# Patient Record
Sex: MALE | Race: WHITE | NOT HISPANIC OR LATINO | Employment: UNEMPLOYED | URBAN - METROPOLITAN AREA
[De-identification: names, ages, dates, MRNs, and addresses within clinical notes are randomized per-mention and may not be internally consistent; named-entity substitution may affect disease eponyms.]

---

## 2022-04-10 ENCOUNTER — HISTORICAL (OUTPATIENT)
Dept: ADMINISTRATIVE | Facility: HOSPITAL | Age: 42
End: 2022-04-10

## 2022-04-29 VITALS
DIASTOLIC BLOOD PRESSURE: 75 MMHG | SYSTOLIC BLOOD PRESSURE: 122 MMHG | HEIGHT: 69 IN | BODY MASS INDEX: 28.99 KG/M2 | WEIGHT: 195.75 LBS

## 2022-11-21 ENCOUNTER — HOSPITAL ENCOUNTER (OUTPATIENT)
Facility: HOSPITAL | Age: 42
Discharge: HOME | End: 2022-11-23
Attending: INTERNAL MEDICINE | Admitting: INTERNAL MEDICINE
Payer: MEDICARE

## 2022-11-21 DIAGNOSIS — R00.0 TACHYCARDIA: ICD-10-CM

## 2022-11-21 DIAGNOSIS — R65.10 SIRS (SYSTEMIC INFLAMMATORY RESPONSE SYNDROME): Primary | ICD-10-CM

## 2022-11-21 DIAGNOSIS — M25.50 POLYARTHRALGIA: ICD-10-CM

## 2022-11-21 DIAGNOSIS — M25.532 ACUTE PAIN OF LEFT WRIST: ICD-10-CM

## 2022-11-21 LAB
ALBUMIN SERPL-MCNC: 2.9 GM/DL (ref 3.5–5)
ALBUMIN/GLOB SERPL: 0.6 RATIO (ref 1.1–2)
ALP SERPL-CCNC: 80 UNIT/L (ref 40–150)
ALT SERPL-CCNC: 10 UNIT/L (ref 0–55)
AST SERPL-CCNC: 10 UNIT/L (ref 5–34)
BASOPHILS # BLD AUTO: 0.03 X10(3)/MCL (ref 0–0.2)
BASOPHILS NFR BLD AUTO: 0.2 %
BILIRUBIN DIRECT+TOT PNL SERPL-MCNC: 0.5 MG/DL
BUN SERPL-MCNC: 16 MG/DL (ref 8.9–20.6)
C TRACH DNA SPEC QL NAA+PROBE: NOT DETECTED
CALCIUM SERPL-MCNC: 10.1 MG/DL (ref 8.4–10.2)
CHLORIDE SERPL-SCNC: 101 MMOL/L (ref 98–107)
CO2 SERPL-SCNC: 23 MMOL/L (ref 22–29)
CREAT SERPL-MCNC: 1.07 MG/DL (ref 0.73–1.18)
CRP SERPL-MCNC: 301 MG/L
EOSINOPHIL # BLD AUTO: 0.01 X10(3)/MCL (ref 0–0.9)
EOSINOPHIL NFR BLD AUTO: 0.1 %
ERYTHROCYTE [DISTWIDTH] IN BLOOD BY AUTOMATED COUNT: 13.9 % (ref 11.5–17)
ERYTHROCYTE [SEDIMENTATION RATE] IN BLOOD: 30 MM/HR (ref 0–15)
GFR SERPLBLD CREATININE-BSD FMLA CKD-EPI: >60 MLS/MIN/1.73/M2
GLOBULIN SER-MCNC: 4.8 GM/DL (ref 2.4–3.5)
GLUCOSE SERPL-MCNC: 117 MG/DL (ref 74–100)
HCT VFR BLD AUTO: 43 % (ref 42–52)
HGB BLD-MCNC: 14.3 GM/DL (ref 14–18)
HIV 1+2 AB+HIV1 P24 AG SERPL QL IA: NONREACTIVE
IMM GRANULOCYTES # BLD AUTO: 0.04 X10(3)/MCL (ref 0–0.04)
IMM GRANULOCYTES NFR BLD AUTO: 0.3 %
LACTATE SERPL-SCNC: 1.5 MMOL/L (ref 0.5–2.2)
LYMPHOCYTES # BLD AUTO: 1.59 X10(3)/MCL (ref 0.6–4.6)
LYMPHOCYTES NFR BLD AUTO: 11.9 %
MCH RBC QN AUTO: 27.4 PG (ref 27–31)
MCHC RBC AUTO-ENTMCNC: 33.3 MG/DL (ref 33–36)
MCV RBC AUTO: 82.5 FL (ref 80–94)
MONOCYTES # BLD AUTO: 0.82 X10(3)/MCL (ref 0.1–1.3)
MONOCYTES NFR BLD AUTO: 6.1 %
N GONORRHOEA DNA SPEC QL NAA+PROBE: NOT DETECTED
NEUTROPHILS # BLD AUTO: 10.9 X10(3)/MCL (ref 2.1–9.2)
NEUTROPHILS NFR BLD AUTO: 81.4 %
NRBC BLD AUTO-RTO: 0 %
PLATELET # BLD AUTO: 378 X10(3)/MCL (ref 130–400)
PMV BLD AUTO: 9.6 FL (ref 7.4–10.4)
POTASSIUM SERPL-SCNC: 4.5 MMOL/L (ref 3.5–5.1)
PROT SERPL-MCNC: 7.7 GM/DL (ref 6.4–8.3)
RBC # BLD AUTO: 5.21 X10(6)/MCL (ref 4.7–6.1)
SODIUM SERPL-SCNC: 136 MMOL/L (ref 136–145)
T PALLIDUM AB SER QL: REACTIVE
URATE SERPL-MCNC: 4.4 MG/DL (ref 3.5–7.2)
WBC # SPEC AUTO: 13.4 X10(3)/MCL (ref 4.5–11.5)

## 2022-11-21 PROCEDURE — 86592 SYPHILIS TEST NON-TREP QUAL: CPT | Performed by: PHYSICIAN ASSISTANT

## 2022-11-21 PROCEDURE — 63600175 PHARM REV CODE 636 W HCPCS: Performed by: STUDENT IN AN ORGANIZED HEALTH CARE EDUCATION/TRAINING PROGRAM

## 2022-11-21 PROCEDURE — 87591 N.GONORRHOEAE DNA AMP PROB: CPT | Performed by: PHYSICIAN ASSISTANT

## 2022-11-21 PROCEDURE — 83605 ASSAY OF LACTIC ACID: CPT | Performed by: PHYSICIAN ASSISTANT

## 2022-11-21 PROCEDURE — 25000003 PHARM REV CODE 250: Performed by: PHYSICIAN ASSISTANT

## 2022-11-21 PROCEDURE — 85651 RBC SED RATE NONAUTOMATED: CPT | Performed by: PHYSICIAN ASSISTANT

## 2022-11-21 PROCEDURE — 80053 COMPREHEN METABOLIC PANEL: CPT | Performed by: PHYSICIAN ASSISTANT

## 2022-11-21 PROCEDURE — 87491 CHLMYD TRACH DNA AMP PROBE: CPT | Performed by: PHYSICIAN ASSISTANT

## 2022-11-21 PROCEDURE — 96361 HYDRATE IV INFUSION ADD-ON: CPT

## 2022-11-21 PROCEDURE — 84550 ASSAY OF BLOOD/URIC ACID: CPT | Performed by: PHYSICIAN ASSISTANT

## 2022-11-21 PROCEDURE — G0378 HOSPITAL OBSERVATION PER HR: HCPCS

## 2022-11-21 PROCEDURE — 63600175 PHARM REV CODE 636 W HCPCS: Performed by: PHYSICIAN ASSISTANT

## 2022-11-21 PROCEDURE — 36415 COLL VENOUS BLD VENIPUNCTURE: CPT | Performed by: STUDENT IN AN ORGANIZED HEALTH CARE EDUCATION/TRAINING PROGRAM

## 2022-11-21 PROCEDURE — 96372 THER/PROPH/DIAG INJ SC/IM: CPT | Mod: 59 | Performed by: STUDENT IN AN ORGANIZED HEALTH CARE EDUCATION/TRAINING PROGRAM

## 2022-11-21 PROCEDURE — 87389 HIV-1 AG W/HIV-1&-2 AB AG IA: CPT | Performed by: STUDENT IN AN ORGANIZED HEALTH CARE EDUCATION/TRAINING PROGRAM

## 2022-11-21 PROCEDURE — 85025 COMPLETE CBC W/AUTO DIFF WBC: CPT | Performed by: PHYSICIAN ASSISTANT

## 2022-11-21 PROCEDURE — 99285 EMERGENCY DEPT VISIT HI MDM: CPT | Mod: 25

## 2022-11-21 PROCEDURE — 96372 THER/PROPH/DIAG INJ SC/IM: CPT | Performed by: PHYSICIAN ASSISTANT

## 2022-11-21 PROCEDURE — 86140 C-REACTIVE PROTEIN: CPT | Performed by: PHYSICIAN ASSISTANT

## 2022-11-21 PROCEDURE — 86780 TREPONEMA PALLIDUM: CPT | Performed by: PHYSICIAN ASSISTANT

## 2022-11-21 PROCEDURE — 0240U COVID/FLU A&B PCR: CPT | Performed by: STUDENT IN AN ORGANIZED HEALTH CARE EDUCATION/TRAINING PROGRAM

## 2022-11-21 PROCEDURE — 93005 ELECTROCARDIOGRAM TRACING: CPT

## 2022-11-21 PROCEDURE — 87040 BLOOD CULTURE FOR BACTERIA: CPT | Performed by: PHYSICIAN ASSISTANT

## 2022-11-21 RX ORDER — IBUPROFEN 200 MG
16 TABLET ORAL
Status: DISCONTINUED | OUTPATIENT
Start: 2022-11-21 | End: 2022-11-23 | Stop reason: HOSPADM

## 2022-11-21 RX ORDER — SODIUM CHLORIDE 9 MG/ML
INJECTION, SOLUTION INTRAVENOUS CONTINUOUS
Status: DISCONTINUED | OUTPATIENT
Start: 2022-11-22 | End: 2022-11-23

## 2022-11-21 RX ORDER — KETOROLAC TROMETHAMINE 30 MG/ML
30 INJECTION, SOLUTION INTRAMUSCULAR; INTRAVENOUS
Status: COMPLETED | OUTPATIENT
Start: 2022-11-21 | End: 2022-11-21

## 2022-11-21 RX ORDER — GLUCAGON 1 MG
1 KIT INJECTION
Status: DISCONTINUED | OUTPATIENT
Start: 2022-11-21 | End: 2022-11-23 | Stop reason: HOSPADM

## 2022-11-21 RX ORDER — IBUPROFEN 200 MG
24 TABLET ORAL
Status: DISCONTINUED | OUTPATIENT
Start: 2022-11-21 | End: 2022-11-23 | Stop reason: HOSPADM

## 2022-11-21 RX ORDER — ACETAMINOPHEN 325 MG/1
650 TABLET ORAL EVERY 6 HOURS PRN
Status: DISCONTINUED | OUTPATIENT
Start: 2022-11-22 | End: 2022-11-22

## 2022-11-21 RX ORDER — PANTOPRAZOLE SODIUM 40 MG/1
40 TABLET, DELAYED RELEASE ORAL DAILY
Status: ON HOLD | COMMUNITY
End: 2022-11-23 | Stop reason: SDUPTHER

## 2022-11-21 RX ORDER — KETOROLAC TROMETHAMINE 30 MG/ML
15 INJECTION, SOLUTION INTRAMUSCULAR; INTRAVENOUS EVERY 6 HOURS PRN
Status: DISCONTINUED | OUTPATIENT
Start: 2022-11-22 | End: 2022-11-22

## 2022-11-21 RX ORDER — ENOXAPARIN SODIUM 100 MG/ML
40 INJECTION SUBCUTANEOUS EVERY 12 HOURS
Status: DISCONTINUED | OUTPATIENT
Start: 2022-11-21 | End: 2022-11-23 | Stop reason: HOSPADM

## 2022-11-21 RX ORDER — SODIUM CHLORIDE 0.9 % (FLUSH) 0.9 %
10 SYRINGE (ML) INJECTION EVERY 12 HOURS PRN
Status: DISCONTINUED | OUTPATIENT
Start: 2022-11-21 | End: 2022-11-23 | Stop reason: HOSPADM

## 2022-11-21 RX ORDER — NALOXONE HCL 0.4 MG/ML
0.02 VIAL (ML) INJECTION
Status: DISCONTINUED | OUTPATIENT
Start: 2022-11-21 | End: 2022-11-23 | Stop reason: HOSPADM

## 2022-11-21 RX ADMIN — SODIUM CHLORIDE 1000 ML: 9 INJECTION, SOLUTION INTRAVENOUS at 07:11

## 2022-11-21 RX ADMIN — ENOXAPARIN SODIUM 40 MG: 40 INJECTION SUBCUTANEOUS at 09:11

## 2022-11-21 RX ADMIN — KETOROLAC TROMETHAMINE 30 MG: 30 INJECTION, SOLUTION INTRAMUSCULAR at 05:11

## 2022-11-21 NOTE — ED PROVIDER NOTES
Encounter Date: 11/21/2022       History     Chief Complaint   Patient presents with    generalized pain     [place] to [place] arm, bilat legs/ feet, swelling to bilat feet & R knee swelling was told to come here for further testing.      Trey Harper is a 42 y.o. male who presents to the ED complaining of multiple joint pain and swelling. He reports 1 week ago he developed pain and swelling in his left wrist. He saw his PCP who thought he may have an infection and was prescribed bactrim. A few days ago he developed pain and swelling in his bilateral ankles making it difficult to walk. He went to see his PCP today who told him to come to the ED for further eval. He does not have any information from PCP with him, but states that he was told he needed to see infectious disease as there is concern for joint infection. Patient states he was recently treated for syphilis and started on PrEP for suspected HIV (has picture of negative HIV result on his phone. He denies any fevers, chills, chest pain, SOB, abdominal pain, N/V/D, dysuria.    The history is provided by the patient. No  was used.   Review of patient's allergies indicates:  No Known Allergies  No past medical history on file.  No past surgical history on file.  No family history on file.     Review of Systems   Constitutional:  Negative for chills and fever.   HENT:  Negative for rhinorrhea.    Eyes:  Negative for redness and itching.   Respiratory:  Negative for cough and shortness of breath.    Cardiovascular:  Negative for chest pain and leg swelling.   Gastrointestinal:  Negative for abdominal pain, constipation and nausea.   Genitourinary:  Negative for dysuria, flank pain and frequency.   Musculoskeletal:  Positive for arthralgias and joint swelling. Negative for back pain.   Skin:  Negative for rash and wound.   Neurological:  Negative for dizziness and headaches.   Psychiatric/Behavioral:  Negative for  agitation and confusion.      Physical Exam     Initial Vitals [11/21/22 1538]   BP Pulse Resp Temp SpO2   139/87 (!) 125 20 98.1 °F (36.7 °C) 100 %      MAP       --         Physical Exam    Nursing note and vitals reviewed.  Constitutional: He appears well-developed and well-nourished. No distress.   HENT:   Head: Normocephalic and atraumatic.   Mouth/Throat: No oropharyngeal exudate.   Eyes: EOM are normal. No scleral icterus.   Neck: Neck supple.   Normal range of motion.  Cardiovascular:  Normal rate and regular rhythm.           No murmur heard.  Pulmonary/Chest: No respiratory distress. He has no wheezes.   Abdominal: Abdomen is soft. He exhibits no distension. There is no abdominal tenderness.   Musculoskeletal:         General: Tenderness and edema present. Normal range of motion.      Cervical back: Normal range of motion and neck supple.      Comments: Mild, non-pitting edema to left wrist and bilateral feet. Left wrist warm and tender to touch. Pain with ROM. Full ROM of bilateral ankles with mild TTP. No rash or wounds noted.      Neurological: He is alert and oriented to person, place, and time. No cranial nerve deficit.   Skin: Skin is warm and dry. Capillary refill takes less than 2 seconds. No erythema.   Psychiatric: He has a normal mood and affect. Thought content normal.       ED Course   Procedures  Labs Reviewed   COMPREHENSIVE METABOLIC PANEL - Abnormal; Notable for the following components:       Result Value    Glucose Level 117 (*)     Albumin Level 2.9 (*)     Globulin 4.8 (*)     Albumin/Globulin Ratio 0.6 (*)     All other components within normal limits   SEDIMENTATION RATE, AUTOMATED - Abnormal; Notable for the following components:    Sed Rate 30 (*)     All other components within normal limits   C-REACTIVE PROTEIN - Abnormal; Notable for the following components:    C-Reactive Protein 301.00 (*)     All other components within normal limits   CBC WITH DIFFERENTIAL - Abnormal;  Notable for the following components:    WBC 13.4 (*)     Neut # 10.9 (*)     All other components within normal limits   SYPHILIS ANTIBODY (WITH REFLEX RPR) - Abnormal; Notable for the following components:    Syphilis Antibody Reactive (*)     All other components within normal limits   URIC ACID - Normal   LACTIC ACID, PLASMA - Normal   CHLAMYDIA/GONORRHOEAE(GC), PCR   BLOOD CULTURE OLG   BLOOD CULTURE OLG   CBC W/ AUTO DIFFERENTIAL    Narrative:     The following orders were created for panel order CBC auto differential.  Procedure                               Abnormality         Status                     ---------                               -----------         ------                     CBC with Differential[[id number]]        Abnormal            Final result                 Please view results for these tests on the individual orders.   EXTRA TUBES    Narrative:     The following orders were created for panel order EXTRA TUBES.  Procedure                               Abnormality         Status                     ---------                               -----------         ------                     Light Blue Top Hold[[id number]]                              In process                 Red Top Hold[[id number]]                                     In process                 Gold Top Hold[[id number]]                                    In process                 Pink Top Hold[[id number]]                                    In process                   Please view results for these tests on the individual orders.   LIGHT BLUE TOP HOLD   RED TOP HOLD   GOLD TOP HOLD   PINK TOP HOLD   RPR   EXTRA TUBES    Narrative:     The following orders were created for panel order EXTRA TUBES.  Procedure                               Abnormality         Status                     ---------                               -----------         ------                     Light Green Top Hold[[id number]]                             In  process                   Please view results for these tests on the individual orders.   LIGHT GREEN TOP HOLD     EKG Readings: (Independently Interpreted)   Initial Reading: No STEMI. Rhythm: Sinus Tachycardia. Heart Rate: 103.     Imaging Results              X-Ray Wrist Complete Left (Final result)  Result time 11/21/22 18:11:24      Final result by Stef Rodriguez MD (11/21/22 18:11:24)                   Impression:      1. No displaced fracture or dislocation.  2. Possible small avulsion injury at the distal tip of the ulnar styloid process.      Electronically signed by: Stef Rodriguez MD  Date:    11/21/2022  Time:    18:11               Narrative:    EXAMINATION:  XR WRIST COMPLETE 3 VIEWS LEFT    CLINICAL HISTORY:  Pain in left wrist    FINDINGS:  There is a faint calcified density at the distal tip of the ulnar styloid process.  There is no acute displaced fracture or dislocation.  There is no acute soft tissue abnormality.                                       Medications   sodium chloride 0.9% flush 10 mL (has no administration in time range)   naloxone 0.4 mg/mL injection 0.02 mg (has no administration in time range)   glucose chewable tablet 16 g (has no administration in time range)   glucose chewable tablet 24 g (has no administration in time range)   dextrose 50% injection 12.5 g (has no administration in time range)   dextrose 50% injection 25 g (has no administration in time range)   glucagon (human recombinant) injection 1 mg (has no administration in time range)   enoxaparin injection 40 mg (has no administration in time range)   ketorolac injection 30 mg (30 mg Intramuscular Given 11/21/22 1749)   sodium chloride 0.9% bolus 1,000 mL (0 mLs Intravenous Stopped 11/21/22 2007)                 ED Course as of 11/21/22 2046 Mon Nov 21, 2022   1914 2/4 SIRS HR >90 and WBC 13. Lactic WNL. ESR, CRP elevated. Medicine consulted [KD]      ED Course User Index  [KD] Jenifer Whitehead PA-C                  Clinical Impression:   Final diagnoses:  [M25.532] Acute pain of left wrist  [R00.0] Tachycardia  [R65.10] SIRS (systemic inflammatory response syndrome) (Primary)  [M25.50] Polyarthralgia        ED Disposition Condition    Observation                 Jenifer Whitehead PA-C  11/21/22 2046       Jenifer Whitehead PA-C  11/21/22 2047

## 2022-11-22 PROBLEM — M25.532 ACUTE PAIN OF LEFT WRIST: Status: ACTIVE | Noted: 2022-11-22

## 2022-11-22 PROBLEM — M25.50 POLYARTHRALGIA: Status: ACTIVE | Noted: 2022-11-22

## 2022-11-22 PROBLEM — R65.10 SIRS (SYSTEMIC INFLAMMATORY RESPONSE SYNDROME): Status: ACTIVE | Noted: 2022-11-22

## 2022-11-22 PROBLEM — R00.0 TACHYCARDIA: Status: ACTIVE | Noted: 2022-11-22

## 2022-11-22 LAB
ALBUMIN SERPL-MCNC: 2.4 GM/DL (ref 3.5–5)
ALBUMIN/GLOB SERPL: 0.6 RATIO (ref 1.1–2)
ALP SERPL-CCNC: 68 UNIT/L (ref 40–150)
ALT SERPL-CCNC: 7 UNIT/L (ref 0–55)
AORTIC ROOT ANNULUS: 3.5 CM
AST SERPL-CCNC: 9 UNIT/L (ref 5–34)
AV INDEX (PROSTH): 0.81
AV MEAN GRADIENT: 4 MMHG
AV PEAK GRADIENT: 7 MMHG
AV VALVE AREA: 2.98 CM2
AV VELOCITY RATIO: 0.78
BASOPHILS # BLD AUTO: 0.04 X10(3)/MCL (ref 0–0.2)
BASOPHILS NFR BLD AUTO: 0.4 %
BILIRUBIN DIRECT+TOT PNL SERPL-MCNC: 0.2 MG/DL
BSA FOR ECHO PROCEDURE: 2.03 M2
BUN SERPL-MCNC: 19.9 MG/DL (ref 8.9–20.6)
CALCIUM SERPL-MCNC: 9.3 MG/DL (ref 8.4–10.2)
CHLORIDE SERPL-SCNC: 105 MMOL/L (ref 98–107)
CO2 SERPL-SCNC: 22 MMOL/L (ref 22–29)
CREAT SERPL-MCNC: 1.05 MG/DL (ref 0.73–1.18)
CV ECHO LV RWT: 0.46 CM
DOP CALC AO PEAK VEL: 1.29 M/S
DOP CALC AO VTI: 19.2 CM
DOP CALC LVOT AREA: 3.7 CM2
DOP CALC LVOT DIAMETER: 2.16 CM
DOP CALC LVOT PEAK VEL: 1 M/S
DOP CALC LVOT STROKE VOLUME: 57.13 CM3
DOP CALC MV VTI: 21.7 CM
DOP CALCLVOT PEAK VEL VTI: 15.6 CM
E WAVE DECELERATION TIME: 107.24 MSEC
E/A RATIO: 0.82
E/E' RATIO: 4.5 M/S
ECHO LV POSTERIOR WALL: 0.97 CM (ref 0.6–1.1)
EJECTION FRACTION: 64 %
EOSINOPHIL # BLD AUTO: 0.02 X10(3)/MCL (ref 0–0.9)
EOSINOPHIL NFR BLD AUTO: 0.2 %
ERYTHROCYTE [DISTWIDTH] IN BLOOD BY AUTOMATED COUNT: 14.1 % (ref 11.5–17)
FLUAV AG UPPER RESP QL IA.RAPID: NOT DETECTED
FLUBV AG UPPER RESP QL IA.RAPID: NOT DETECTED
FRACTIONAL SHORTENING: 30 % (ref 28–44)
GFR SERPLBLD CREATININE-BSD FMLA CKD-EPI: >60 MLS/MIN/1.73/M2
GLOBULIN SER-MCNC: 4.1 GM/DL (ref 2.4–3.5)
GLUCOSE SERPL-MCNC: 136 MG/DL (ref 74–100)
HAV IGM SERPL QL IA: NONREACTIVE
HBV CORE IGM SERPL QL IA: NONREACTIVE
HBV SURFACE AG SERPL QL IA: NONREACTIVE
HCT VFR BLD AUTO: 34.1 % (ref 42–52)
HCV AB SERPL QL IA: NONREACTIVE
HGB BLD-MCNC: 11.4 GM/DL (ref 14–18)
IMM GRANULOCYTES # BLD AUTO: 0.03 X10(3)/MCL (ref 0–0.04)
IMM GRANULOCYTES NFR BLD AUTO: 0.3 %
INTERVENTRICULAR SEPTUM: 1 CM (ref 0.6–1.1)
LEFT ATRIUM SIZE: 2.99 CM
LEFT ATRIUM VOLUME INDEX MOD: 19.9 ML/M2
LEFT ATRIUM VOLUME MOD: 40 CM3
LEFT INTERNAL DIMENSION IN SYSTOLE: 2.97 CM (ref 2.1–4)
LEFT VENTRICLE DIASTOLIC VOLUME INDEX: 40 ML/M2
LEFT VENTRICLE DIASTOLIC VOLUME: 80.39 ML
LEFT VENTRICLE MASS INDEX: 68 G/M2
LEFT VENTRICLE SYSTOLIC VOLUME INDEX: 17 ML/M2
LEFT VENTRICLE SYSTOLIC VOLUME: 34.24 ML
LEFT VENTRICULAR INTERNAL DIMENSION IN DIASTOLE: 4.24 CM (ref 3.5–6)
LEFT VENTRICULAR MASS: 136.43 G
LV LATERAL E/E' RATIO: 4.13 M/S
LV SEPTAL E/E' RATIO: 4.95 M/S
LVOT MG: 2.34 MMHG
LVOT MV: 0.73 CM/S
LYMPHOCYTES # BLD AUTO: 1.81 X10(3)/MCL (ref 0.6–4.6)
LYMPHOCYTES NFR BLD AUTO: 18.2 %
MAGNESIUM SERPL-MCNC: 2.3 MG/DL (ref 1.6–2.6)
MCH RBC QN AUTO: 27.6 PG (ref 27–31)
MCHC RBC AUTO-ENTMCNC: 33.4 MG/DL (ref 33–36)
MCV RBC AUTO: 82.6 FL (ref 80–94)
MONOCYTES # BLD AUTO: 0.97 X10(3)/MCL (ref 0.1–1.3)
MONOCYTES NFR BLD AUTO: 9.7 %
MV MEAN GRADIENT: 3 MMHG
MV PEAK A VEL: 1.21 M/S
MV PEAK E VEL: 0.99 M/S
MV PEAK GRADIENT: 7 MMHG
MV STENOSIS PRESSURE HALF TIME: 31.1 MS
MV VALVE AREA BY CONTINUITY EQUATION: 2.63 CM2
MV VALVE AREA P 1/2 METHOD: 7.07 CM2
NEUTROPHILS # BLD AUTO: 7.1 X10(3)/MCL (ref 2.1–9.2)
NEUTROPHILS NFR BLD AUTO: 71.2 %
NRBC BLD AUTO-RTO: 0 %
PHOSPHATE SERPL-MCNC: 4.3 MG/DL (ref 2.3–4.7)
PLATELET # BLD AUTO: 304 X10(3)/MCL (ref 130–400)
PMV BLD AUTO: 9.9 FL (ref 7.4–10.4)
POTASSIUM SERPL-SCNC: 4.2 MMOL/L (ref 3.5–5.1)
PROT SERPL-MCNC: 6.5 GM/DL (ref 6.4–8.3)
RA PRESSURE: 3 MMHG
RBC # BLD AUTO: 4.13 X10(6)/MCL (ref 4.7–6.1)
RIGHT VENTRICULAR END-DIASTOLIC DIMENSION: 2.53 CM
RPR SER QL: ABNORMAL
RPR SER QL: REACTIVE
RPR SER-TITR: ABNORMAL {TITER}
SARS-COV-2 RNA RESP QL NAA+PROBE: NOT DETECTED
SODIUM SERPL-SCNC: 137 MMOL/L (ref 136–145)
TDI LATERAL: 0.24 M/S
TDI SEPTAL: 0.2 M/S
TDI: 0.22 M/S
URATE SERPL-MCNC: 4.8 MG/DL (ref 3.5–7.2)
WBC # SPEC AUTO: 10 X10(3)/MCL (ref 4.5–11.5)

## 2022-11-22 PROCEDURE — 63600175 PHARM REV CODE 636 W HCPCS: Performed by: STUDENT IN AN ORGANIZED HEALTH CARE EDUCATION/TRAINING PROGRAM

## 2022-11-22 PROCEDURE — 84100 ASSAY OF PHOSPHORUS: CPT | Performed by: STUDENT IN AN ORGANIZED HEALTH CARE EDUCATION/TRAINING PROGRAM

## 2022-11-22 PROCEDURE — 96361 HYDRATE IV INFUSION ADD-ON: CPT | Mod: 59

## 2022-11-22 PROCEDURE — 36415 COLL VENOUS BLD VENIPUNCTURE: CPT | Performed by: STUDENT IN AN ORGANIZED HEALTH CARE EDUCATION/TRAINING PROGRAM

## 2022-11-22 PROCEDURE — 25000003 PHARM REV CODE 250: Performed by: STUDENT IN AN ORGANIZED HEALTH CARE EDUCATION/TRAINING PROGRAM

## 2022-11-22 PROCEDURE — 96375 TX/PRO/DX INJ NEW DRUG ADDON: CPT

## 2022-11-22 PROCEDURE — 86812 HLA TYPING A B OR C: CPT | Performed by: STUDENT IN AN ORGANIZED HEALTH CARE EDUCATION/TRAINING PROGRAM

## 2022-11-22 PROCEDURE — 96367 TX/PROPH/DG ADDL SEQ IV INF: CPT

## 2022-11-22 PROCEDURE — 96372 THER/PROPH/DIAG INJ SC/IM: CPT | Performed by: STUDENT IN AN ORGANIZED HEALTH CARE EDUCATION/TRAINING PROGRAM

## 2022-11-22 PROCEDURE — 25000003 PHARM REV CODE 250: Performed by: INTERNAL MEDICINE

## 2022-11-22 PROCEDURE — 87040 BLOOD CULTURE FOR BACTERIA: CPT | Mod: 59,91 | Performed by: STUDENT IN AN ORGANIZED HEALTH CARE EDUCATION/TRAINING PROGRAM

## 2022-11-22 PROCEDURE — 63600175 PHARM REV CODE 636 W HCPCS: Performed by: INTERNAL MEDICINE

## 2022-11-22 PROCEDURE — 25000003 PHARM REV CODE 250

## 2022-11-22 PROCEDURE — 86039 ANTINUCLEAR ANTIBODIES (ANA): CPT | Performed by: STUDENT IN AN ORGANIZED HEALTH CARE EDUCATION/TRAINING PROGRAM

## 2022-11-22 PROCEDURE — 20610 PR DRAIN/INJECT LARGE JOINT/BURSA: ICD-10-PCS | Mod: RT,,, | Performed by: INTERNAL MEDICINE

## 2022-11-22 PROCEDURE — 86747 PARVOVIRUS ANTIBODY: CPT

## 2022-11-22 PROCEDURE — 83735 ASSAY OF MAGNESIUM: CPT | Performed by: STUDENT IN AN ORGANIZED HEALTH CARE EDUCATION/TRAINING PROGRAM

## 2022-11-22 PROCEDURE — 99204 PR OFFICE/OUTPT VISIT, NEW, LEVL IV, 45-59 MIN: ICD-10-PCS | Mod: 25,,, | Performed by: INTERNAL MEDICINE

## 2022-11-22 PROCEDURE — 85025 COMPLETE CBC W/AUTO DIFF WBC: CPT | Performed by: STUDENT IN AN ORGANIZED HEALTH CARE EDUCATION/TRAINING PROGRAM

## 2022-11-22 PROCEDURE — 96368 THER/DIAG CONCURRENT INF: CPT

## 2022-11-22 PROCEDURE — 20610 DRAIN/INJ JOINT/BURSA W/O US: CPT | Mod: RT,,, | Performed by: INTERNAL MEDICINE

## 2022-11-22 PROCEDURE — 80053 COMPREHEN METABOLIC PANEL: CPT | Performed by: STUDENT IN AN ORGANIZED HEALTH CARE EDUCATION/TRAINING PROGRAM

## 2022-11-22 PROCEDURE — 96366 THER/PROPH/DIAG IV INF ADDON: CPT

## 2022-11-22 PROCEDURE — 63600175 PHARM REV CODE 636 W HCPCS

## 2022-11-22 PROCEDURE — 83516 IMMUNOASSAY NONANTIBODY: CPT | Performed by: STUDENT IN AN ORGANIZED HEALTH CARE EDUCATION/TRAINING PROGRAM

## 2022-11-22 PROCEDURE — 86664 EPSTEIN-BARR NUCLEAR ANTIGEN: CPT

## 2022-11-22 PROCEDURE — 99204 OFFICE O/P NEW MOD 45 MIN: CPT | Mod: 25,,, | Performed by: INTERNAL MEDICINE

## 2022-11-22 PROCEDURE — G0378 HOSPITAL OBSERVATION PER HR: HCPCS

## 2022-11-22 PROCEDURE — 84550 ASSAY OF BLOOD/URIC ACID: CPT | Performed by: STUDENT IN AN ORGANIZED HEALTH CARE EDUCATION/TRAINING PROGRAM

## 2022-11-22 PROCEDURE — 80074 ACUTE HEPATITIS PANEL: CPT

## 2022-11-22 PROCEDURE — 86036 ANCA SCREEN EACH ANTIBODY: CPT | Performed by: STUDENT IN AN ORGANIZED HEALTH CARE EDUCATION/TRAINING PROGRAM

## 2022-11-22 PROCEDURE — 86665 EPSTEIN-BARR CAPSID VCA: CPT | Mod: 59

## 2022-11-22 PROCEDURE — 20610 DRAIN/INJ JOINT/BURSA W/O US: CPT | Mod: RT

## 2022-11-22 PROCEDURE — 86003 ALLG SPEC IGE CRUDE XTRC EA: CPT | Performed by: STUDENT IN AN ORGANIZED HEALTH CARE EDUCATION/TRAINING PROGRAM

## 2022-11-22 PROCEDURE — 96365 THER/PROPH/DIAG IV INF INIT: CPT | Mod: 59

## 2022-11-22 RX ORDER — NAPROXEN 250 MG/1
500 TABLET ORAL 2 TIMES DAILY WITH MEALS
Status: DISCONTINUED | OUTPATIENT
Start: 2022-11-22 | End: 2022-11-23 | Stop reason: HOSPADM

## 2022-11-22 RX ORDER — PANTOPRAZOLE SODIUM 40 MG/1
40 TABLET, DELAYED RELEASE ORAL DAILY
Status: DISCONTINUED | OUTPATIENT
Start: 2022-11-22 | End: 2022-11-23 | Stop reason: HOSPADM

## 2022-11-22 RX ORDER — KETOROLAC TROMETHAMINE 30 MG/ML
15 INJECTION, SOLUTION INTRAMUSCULAR; INTRAVENOUS EVERY 6 HOURS PRN
Status: DISCONTINUED | OUTPATIENT
Start: 2022-11-22 | End: 2022-11-23 | Stop reason: HOSPADM

## 2022-11-22 RX ORDER — HYDROCODONE BITARTRATE AND ACETAMINOPHEN 10; 325 MG/1; MG/1
1 TABLET ORAL EVERY 6 HOURS PRN
Status: DISCONTINUED | OUTPATIENT
Start: 2022-11-22 | End: 2022-11-23 | Stop reason: HOSPADM

## 2022-11-22 RX ORDER — HYDROMORPHONE HYDROCHLORIDE 1 MG/ML
1 INJECTION, SOLUTION INTRAMUSCULAR; INTRAVENOUS; SUBCUTANEOUS EVERY 6 HOURS PRN
Status: DISCONTINUED | OUTPATIENT
Start: 2022-11-22 | End: 2022-11-23 | Stop reason: HOSPADM

## 2022-11-22 RX ORDER — HYDROCODONE BITARTRATE AND ACETAMINOPHEN 5; 325 MG/1; MG/1
1 TABLET ORAL EVERY 6 HOURS PRN
Status: DISCONTINUED | OUTPATIENT
Start: 2022-11-22 | End: 2022-11-22

## 2022-11-22 RX ADMIN — VANCOMYCIN HYDROCHLORIDE 1250 MG: 1 INJECTION, POWDER, LYOPHILIZED, FOR SOLUTION INTRAVENOUS at 11:11

## 2022-11-22 RX ADMIN — NAPROXEN 500 MG: 250 TABLET ORAL at 04:11

## 2022-11-22 RX ADMIN — SODIUM CHLORIDE: 9 INJECTION, SOLUTION INTRAVENOUS at 11:11

## 2022-11-22 RX ADMIN — HYDROMORPHONE HYDROCHLORIDE 1 MG: 1 INJECTION, SOLUTION INTRAMUSCULAR; INTRAVENOUS; SUBCUTANEOUS at 10:11

## 2022-11-22 RX ADMIN — CEFTRIAXONE 2 G: 2 INJECTION, POWDER, FOR SOLUTION INTRAMUSCULAR; INTRAVENOUS at 12:11

## 2022-11-22 RX ADMIN — HYDROCODONE BITARTRATE AND ACETAMINOPHEN 1 TABLET: 5; 325 TABLET ORAL at 10:11

## 2022-11-22 RX ADMIN — PANTOPRAZOLE SODIUM 40 MG: 40 TABLET, DELAYED RELEASE ORAL at 02:11

## 2022-11-22 RX ADMIN — VANCOMYCIN HYDROCHLORIDE 1250 MG: 1 INJECTION, POWDER, LYOPHILIZED, FOR SOLUTION INTRAVENOUS at 12:11

## 2022-11-22 RX ADMIN — SODIUM CHLORIDE: 9 INJECTION, SOLUTION INTRAVENOUS at 12:11

## 2022-11-22 RX ADMIN — PREDNISONE 30 MG: 20 TABLET ORAL at 04:11

## 2022-11-22 RX ADMIN — ENOXAPARIN SODIUM 40 MG: 40 INJECTION SUBCUTANEOUS at 09:11

## 2022-11-22 RX ADMIN — KETOROLAC TROMETHAMINE 15 MG: 30 INJECTION, SOLUTION INTRAMUSCULAR at 02:11

## 2022-11-22 RX ADMIN — HYDROCODONE BITARTRATE AND ACETAMINOPHEN 1 TABLET: 5; 325 TABLET ORAL at 09:11

## 2022-11-22 RX ADMIN — KETOROLAC TROMETHAMINE 15 MG: 30 INJECTION, SOLUTION INTRAMUSCULAR at 06:11

## 2022-11-22 NOTE — PLAN OF CARE
New consult to obtain syphilis records noted. Patient stated his PCP, Prisca Contreras in [place], P: [phone], has these records. Call placed to office; left detailed message regarding records that are requested and asked for a return call. Will follow.     Spoke to Salima with [facility], P: [phone], who will fax all syphilis records.    Received return call from Nunu at Dr. Contreras's office stating that she will also fax his records. Will scan documents into patient's chart when received.

## 2022-11-22 NOTE — H&P
[facility] Medicine Wards History & Physical Note     Resident Team: [facility] Medicine List 3  Attending Physician: James Duncan MD  Resident: Samantha   Intern: Ricky     Date of Admit: 11/21/2022    Chief Complaint     generalized pain ([place] to [place] in [place] C/o pain to left arm, bilat legs/ feet, swelling to bilat feet & R knee swelling was told to come here for further testing. )      Subjective:      History of Present Illness:  42-year-old male with past medical history of GERD and chronic right knee pain status post trauma and surgery presented to the ED complaining of left wrist, hand swelling with bilateral foot and ankle swelling with tenderness on palpation.  He stated that he 1st noticed his left arm being swollen on Thursday and then progressively seen both foot and ankle being swollen the next day.  Patient stated that he went to visit his friend at his home in [place] on Thursday but other than that denies any recent travel.  Denies any animal bites, exposure with brackish water, neha nails in the last 1-2 weeks.  Denied any bulbar symptoms including dysphagia, dysarthria, diplopia at this time.  Patient stated that he was in the ED 2 weeks ago due to having bloody diarrhea where he was discharged with ciprofloxacin and Flagyl at that time.  He stated that his diarrhea resolved and he stopped taking the antibiotics after that.  He stated that he was recently tested for syphilis in September and had completed 6 dose injections of penicillin to complete the treatment in October.  He stated that when he was seen at his PCP's office on Friday his PCP was worried about the patient having HIV given history of syphilis and started him on PrEP for prevention and was sent him home with Bactrim for his leg swelling.  He stated he stopped taking PrEP.  He also tested negative for HIV per the patient when his PCP checked him for HIV.  He stated came he came in today because his swelling in his legs has been  "getting worse.  Denies having any fever, chills, nausea, vomiting, abdominal pain, weakness, numbness or tingling in extremities, headache, neck stiffness, urinary incontinence, blurry vision at this time.    In the ED patient was found to have leukocytosis with a white count of 13.4 with tachycardia with heart rate of 125 with inflammatory markers elevated (sed rate of 30, CRP of 301).  X-ray of the right wrist shows possible small avulsion injury at the distal tip of the ulnar styloid process with no displaced fracture or dislocation.  Internal Medicine was consulted for further care and management.    Past Medical History:    GERD     Past Surgical History:  Right knee surgery   Back surgery for disc herniation     Family History:  No family history on file.    Social History:   Tobacco- Denies    Alc - socially    Drugs - None    Sexual hx : Did not practice safe sex in the past ; was taking PrEP for suspected HIV        Allergies:  Review of patient's allergies indicates:  No Known Allergies    Home Medications:  Prior to Admission medications    Not on File         Review of Systems:  ROS completed and negative except as indicated above.          Objective:   Last 24 Hour Vital Signs:  BP  Min: 119/79  Max: 146/85  Temp  Av.2 °F (36.8 °C)  Min: 98.1 °F (36.7 °C)  Max: 98.3 °F (36.8 °C)  Pulse  Av.7  Min: 100  Max: 125  Resp  Av  Min: 18  Max: 20  SpO2  Av.7 %  Min: 99 %  Max: 100 %  Height  Av' 9" (175.3 cm)  Min: 5' 9" (175.3 cm)  Max: 5' 9" (175.3 cm)  Weight  Av.9 kg (185 lb)  Min: 83.9 kg (185 lb)  Max: 83.9 kg (185 lb)  Body mass index is 27.32 kg/m².  No intake/output data recorded.    Physical Examination:  General: Nontoxic-appearing  man lying in bed in no acute distress  Eye: PERRL, EOMI  HENT: Normocephalic, atraumatic, moist mucous membranes, thrush noted on exam   Neck: Supple, nontender, no JVD  Respiratory: Clear to auscultation bilaterally, no wheezes, " rales, or rhonchi. Normal work of breathing  Cardiovascular: Regular rate and rhythm,  no murmur present, rubs, or gallops. No peripheral edema. 2+ radial pulses  Gastrointestinal: Soft, nontender, non distended abdomen; incision on mid lower abdomen from surgery intact   Musculoskeletal: limited movement with RLE due to knee pain that's chronic, Bl foot swelling and tenderness on palpation, L wrist and hand swelling    Integumentary: Warm, dry, intact. No rashes  Psychiatric: Appropriate mood and affect  Neurologic:  CN II- XII: Intact   Alert and  oriented x3   Motor: Strength 2/5 on RLE due to pain; 5/5 throughout on UE and LLE , bulk & tone normal throughout, No involuntary movements, no pronator drift  Sensory: Gross sensation intact & symmetric bilateral upper & lower extremities  Reflexes: 2+ equal and symmetric throughout bilateral upper and  R lower extremity ; Limited exam due to pain in his knee and ankle area  Cerebellar Exam: Normal finger-to-nose, heel-to-shin, rebound phenomenon and rapid alternating movements.Tremors on right hand appreciated only when he bends his index finger    Romberg: could not perform due to pain  Gait: limited due to pain; pt is not able to ambulate         Laboratory:  Most Recent Data:  CBC:   Lab Results   Component Value Date    WBC 13.4 (H) 11/21/2022    HGB 14.3 11/21/2022    HCT 43.0 11/21/2022     11/21/2022    MCV 82.5 11/21/2022    RDW 13.9 11/21/2022     WBC Differential:   Recent Labs   Lab 11/21/22  1704   WBC 13.4*   HGB 14.3   HCT 43.0      MCV 82.5     BMP:   Lab Results   Component Value Date     11/21/2022    K 4.5 11/21/2022    CO2 23 11/21/2022    BUN 16.0 11/21/2022    CREATININE 1.07 11/21/2022    CALCIUM 10.1 11/21/2022     LFTs:   Lab Results   Component Value Date    ALBUMIN 2.9 (L) 11/21/2022    BILITOT 0.5 11/21/2022    AST 10 11/21/2022    ALKPHOS 80 11/21/2022    ALT 10 11/21/2022     Coags: No results found for: INR, PROTIME,  PTT  FLP: No results found for: CHOL, HDL, LDLCALC, TRIG, CHOLHDL  DM:   Lab Results   Component Value Date    CREATININE 1.07 11/21/2022     Thyroid: No results found for: TSH, FREET4, M4NSZSU, X7EITMI, THYROIDAB  Anemia: No results found for: IRON, TIBC, FERRITIN, ZXTPCAAR27, FOLATE  Cardiac: No results found for: TROPONINI, CKTOTAL, CKMB, BNP  Urinalysis: No results found for: LABURIN, COLORU, PHUA, CLARITYU, SPECGRAV, LABSPEC, NITRITE, PROTEINUR, GLUCOSEU, KETONESU, UROBILINOGEN, BILIRUBINUR, BLOODU, RBCU, WBCUA    Trended Lab Data:  Recent Labs   Lab 11/21/22  1704 11/21/22  1705   WBC 13.4*  --    HGB 14.3  --    HCT 43.0  --      --    MCV 82.5  --    RDW 13.9  --    NA  --  136   K  --  4.5   CO2  --  23   BUN  --  16.0   CREATININE  --  1.07   ALBUMIN  --  2.9*   BILITOT  --  0.5   AST  --  10   ALKPHOS  --  80   ALT  --  10       Trended Cardiac Data:  No results for input(s): TROPONINI, CKTOTAL, CKMB, BNP in the last 168 hours.      Radiology:  Imaging Results              X-Ray Wrist Complete Left (Final result)  Result time 11/21/22 18:11:24      Final result by Stef Rodriguez MD (11/21/22 18:11:24)                   Impression:      1. No displaced fracture or dislocation.  2. Possible small avulsion injury at the distal tip of the ulnar styloid process.      Electronically signed by: Stef Rodriguez MD  Date:    11/21/2022  Time:    18:11               Narrative:    EXAMINATION:  XR WRIST COMPLETE 3 VIEWS LEFT    CLINICAL HISTORY:  Pain in left wrist    FINDINGS:  There is a faint calcified density at the distal tip of the ulnar styloid process.  There is no acute displaced fracture or dislocation.  There is no acute soft tissue abnormality.                                           Assessment & Plan:     Left arm/wrist swelling   Bilateral foot/ ankle swelling  History of diarrhea 1 week ago  Leukocytosis, Tachycardia (SIRS 2/4)  Chronic right knee pain s/p trauma   Reactive arthritis  versus synovitis versus cellulitis versus GBS versus rheumatoid arthritis  Elevated inflammatory markers with a sed rate of 30 and CRP of 301  WBC of 13.4 with neutrophils of 10.9  Previously given ciprofloxacin and Flagyl in the ED on 11/11/2022 for bloody diarrhea which got resolved   Chlamydia gonorrhea PCR negative  Started on vanc and Rocephin for likely concern for synovitis  Labs pending:  Flu/COVID, HIV, HLA B27, BASIL, Anca, RF, CCP, Campylobacter  Xray pending:  Right knee, bilateral ankles, sacro illeum     History of Syphilis  Completed treatment for 6 weeks with penicillin per patient   Syphilis antibody positive in the ED   RPR with dils  pending  Will consult case management to the records            CODE STATUS: Full   Access: PIV   Antibiotics: Vanc and rocephin   Diet: regular   DVT Prophylaxis: Lovenox 40   GI Prophylaxis: None  Fluids:       Disposition:  Admitted due to left hand and bilateral foot/ankle swelling, difficulty moving his right leg due to knee pain, x-rays of extremities and lab work pending          Devika García DO  [facility] HO-1

## 2022-11-22 NOTE — ED PROVIDER NOTES
Encounter Date: 11/21/2022       History     Chief Complaint   Patient presents with    generalized pain     [place] [facility] [place], bilat legs/ feet, swelling to bilat feet & R knee swelling was told to come here for further testing.      HPI  Review of patient's allergies indicates:  No Known Allergies  No past medical history on file.  No past surgical history on file.  No family history on file.     Review of Systems    Physical Exam     Initial Vitals [11/21/22 1538]   BP Pulse Resp Temp SpO2   139/87 (!) 125 20 98.1 °F (36.7 °C) 100 %      MAP       --         Physical Exam    ED Course   Procedures  Labs Reviewed   COMPREHENSIVE METABOLIC PANEL - Abnormal; Notable for the following components:       Result Value    Glucose Level 117 (*)     Albumin Level 2.9 (*)     Globulin 4.8 (*)     Albumin/Globulin Ratio 0.6 (*)     All other components within normal limits   SEDIMENTATION RATE, AUTOMATED - Abnormal; Notable for the following components:    Sed Rate 30 (*)     All other components within normal limits   C-REACTIVE PROTEIN - Abnormal; Notable for the following components:    C-Reactive Protein 301.00 (*)     All other components within normal limits   CBC WITH DIFFERENTIAL - Abnormal; Notable for the following components:    WBC 13.4 (*)     Neut # 10.9 (*)     All other components within normal limits   SYPHILIS ANTIBODY (WITH REFLEX RPR) - Abnormal; Notable for the following components:    Syphilis Antibody Reactive (*)     All other components within normal limits   URIC ACID - Normal   LACTIC ACID, PLASMA - Normal   CHLAMYDIA/GONORRHOEAE(GC), PCR   BLOOD CULTURE OLG   BLOOD CULTURE OLG   CBC W/ AUTO DIFFERENTIAL    Narrative:     The following orders were created for panel order CBC auto differential.  Procedure                               Abnormality         Status                     ---------                               -----------         ------                     CBC  with Differential[[id number]]        Abnormal            Final result                 Please view results for these tests on the individual orders.   EXTRA TUBES    Narrative:     The following orders were created for panel order EXTRA TUBES.  Procedure                               Abnormality         Status                     ---------                               -----------         ------                     Light Blue Top Hold[[id number]]                              In process                 Red Top Hold[[id number]]                                     In process                 Gold Top Hold[[id number]]                                    In process                 Pink Top Hold[[id number]]                                    In process                   Please view results for these tests on the individual orders.   LIGHT BLUE TOP HOLD   RED TOP HOLD   GOLD TOP HOLD   PINK TOP HOLD   RPR   EXTRA TUBES    Narrative:     The following orders were created for panel order EXTRA TUBES.  Procedure                               Abnormality         Status                     ---------                               -----------         ------                     Light Green Top Hold[[id number]]                             In process                   Please view results for these tests on the individual orders.   LIGHT GREEN TOP HOLD          Imaging Results              X-Ray Wrist Complete Left (Final result)  Result time 11/21/22 18:11:24      Final result by Stef Rodriguez MD (11/21/22 18:11:24)                   Impression:      1. No displaced fracture or dislocation.  2. Possible small avulsion injury at the distal tip of the ulnar styloid process.      Electronically signed by: Stef Rodriguez MD  Date:    11/21/2022  Time:    18:11               Narrative:    EXAMINATION:  XR WRIST COMPLETE 3 VIEWS LEFT    CLINICAL HISTORY:  Pain in left wrist    FINDINGS:  There is a faint calcified density at the distal tip  of the ulnar styloid process.  There is no acute displaced fracture or dislocation.  There is no acute soft tissue abnormality.                                       Medications   sodium chloride 0.9% flush 10 mL (has no administration in time range)   naloxone 0.4 mg/mL injection 0.02 mg (has no administration in time range)   glucose chewable tablet 16 g (has no administration in time range)   glucose chewable tablet 24 g (has no administration in time range)   glucagon (human recombinant) injection 1 mg (has no administration in time range)   enoxaparin injection 40 mg (has no administration in time range)   dextrose 10% bolus 125 mL (has no administration in time range)   dextrose 10% bolus 250 mL (has no administration in time range)   ketorolac injection 30 mg (30 mg Intramuscular Given 11/21/22 1749)   sodium chloride 0.9% bolus 1,000 mL (0 mLs Intravenous Stopped 11/21/22 2007)                Attending Attestation:   Physician Attestation Statement for Resident:  As the supervising MD   Physician Attestation Statement: I have personally seen and examined this patient.   I agree with the above history.  -:                    ED Course as of 11/21/22 2102 Mon Nov 21, 2022   1914 2/4 SIRS HR >90 and WBC 13. Lactic WNL. ESR, CRP elevated. Medicine consulted [KD]      ED Course User Index  [KD] Jenifer Whitehead PA-C                 Clinical Impression:   Final diagnoses:  [M25.532] Acute pain of left wrist  [R00.0] Tachycardia  [R65.10] SIRS (systemic inflammatory response syndrome) (Primary)  [M25.50] Polyarthralgia        ED Disposition Condition    Observation                 Luis Ruiz MD  11/21/22 4769

## 2022-11-22 NOTE — CONSULTS
[facility] - [address] [place] [address] Surg Telemetry  Rheumatology  Consult Note    Patient Name: Trey Harper  MRN: [id number]  Admission Date: 11/21/2022  Hospital Length of Stay: 0 days  Code Status: Full Code   Attending Provider: James Duncan MD  Primary Care Physician: Prisca Contreras MD  Principal Problem:<principal problem not specified>    Consults  Subjective:     HPI:  42 year old male presented to ED complaining of pain and swelling in left wrist, left hand, bilateral ankles and right knee.  Symptoms started last Wednesday, 1 week back.  Started with pain and swelling in left wrist and progress to bilateral ankles and right knee.  Denies fevers or recent travel.  History of bloody diarrhea few weeks back and he went to ER for that.  He was treated with ciprofloxacin and Flagyl at the time.  Diarrhea has resolved now.  Denies history of rashes, oral or nasal or genital ulcers.    This is the 1st episode he had swelling and pain in multiple joints, never had similar issues in the past.  History of septic arthritis in right knee and he was 5 to 6 years old.    Per patient he tested positive for syphilis recently and he was treated for that.     PMH: GERD and chronic right knee pain.    Denies fevers, rashes, oral and nasal ulcers, history of DVT or PE, history of stroke or seizures, history of MI, history of malignancies, Raynaud's phenomenon, history of inflammatory eye diseases, history of inflammatory bowel disease.      There is no immunization history on file for this patient.    Review of patient's allergies indicates:  No Known Allergies  Current Facility-Administered Medications   Medication Frequency    0.9%  NaCl infusion Continuous    cefTRIAXone (ROCEPHIN) 2 g in sodium chloride 0.9 % 50 mL IVPB (MB+) Q24H    dextrose 10% bolus 125 mL PRN    dextrose 10% bolus 250 mL PRN    enoxaparin injection 40 mg Q12H    glucagon (human recombinant) injection 1 mg PRN    glucose chewable tablet 16 g PRN     glucose chewable tablet 24 g PRN    HYDROcodone-acetaminophen  mg per tablet 1 tablet Q6H PRN    HYDROmorphone injection 1 mg Q6H PRN    ketorolac injection 15 mg Q6H PRN    naloxone 0.4 mg/mL injection 0.02 mg PRN    sodium chloride 0.9% flush 10 mL Q12H PRN    vancomycin (VANCOCIN) 1,250 mg in sodium chloride 0.9% 250 mL IVPB Q12H    vancomycin - pharmacy to dose pharmacy to manage frequency     Family History    None       Tobacco Use    Smoking status: Not on file    Smokeless tobacco: Not on file   Substance and Sexual Activity    Alcohol use: Not on file    Drug use: Not on file    Sexual activity: Not on file     Review of Systems    CONSTITUTIONAL: negative with no fatigue or fevers  SKIN: negative with no recent rashes  EYES: negative with no complaints of dry irritated eyes  ENT: negative with no mouth dryness or sores  ENDO: negative with no hypothyroid symptoms  HEME: negative, with no history of anemia or DVT  CV: negative without exertional chest pain, palpitations, or edema  RESP: negative without cough, dyspnea, or pleuritic pain  GI: negative without nausea, vomiting, heartburn, or bleeding  NEURO: negative, with no imbalance and no numbness or tingling of the hands or feet  MUSCULOSKELETAL: as per HPI    Objective:     Vital Signs (Most Recent):  Temp: 99.3 °F (37.4 °C) (11/22/22 1106)  Pulse: (!) 114 (11/22/22 1106)  Resp: 20 (11/22/22 1055)  BP: (!) 145/93 (11/22/22 1106)  SpO2: 96 % (11/22/22 1106)  O2 Device (Oxygen Therapy): room air (11/21/22 2102)   Vital Signs (24h Range):  Temp:  [98 °F (36.7 °C)-99.3 °F (37.4 °C)] 99.3 °F (37.4 °C)  Pulse:  [] 114  Resp:  [18-20] 20  SpO2:  [96 %-100 %] 96 %  BP: (119-149)/() 145/93     Weight: 85 kg (187 lb 6.4 oz) (11/21/22 2110)  Body mass index is 27.67 kg/m².  Body surface area is 2.03 meters squared.      Intake/Output Summary (Last 24 hours) at 11/22/2022 1341  Last data filed at 11/22/2022 0558  Gross per 24 hour   Intake 1200  ml   Output --   Net 1200 ml       Physical Exam    General Appearance: no acute distress and cooperative  Skin: Skin color, texture, turgor normal. No rashes or lesions.  Eyes: conjunctivae/corneas clear. PERRL, EOM's intact.   ENT: No oral or nasal ulcers.  Neck:  Neck supple. No adenopathy. Thyroid symmetric, normal size and no nodules.  Lungs: CTA throughout without crackles, rhonchi, or wheezes.   Heart: RRR w/o S3, S4, or murmurs. The PMI is not displaced. No JVD or edema.  Abdomen: Soft, non-tender, no masses, organomegaly, rebound or guarding.  Neuro: CN II-XII GI, DTRs +2/4 throughout, sensory and motor innervation intact, no pathologic reflexes elicited.  Musculoskeletal:   Swelling and tenderness in left wrist with palpation.  Swelling of dorsum of left hand.  No tenderness in MCPs or PIP knees bilaterally.  Swelling, tenderness and large effusion of right knee.  Trace effusion of left knee.  Swelling, tenderness and warmth of bilateral ankles, tenderness with range of motion of bilateral ankles.      Significant Labs:  Blood Culture: No results for input(s): LABBLOO in the last 24 hours.  CBC:   Recent Labs   Lab 11/21/22  1704 11/22/22  0133   WBC 13.4* 10.0   HGB 14.3 11.4*   HCT 43.0 34.1*    304     CMP:   Recent Labs   Lab 11/22/22  0133   CALCIUM 9.3   ALBUMIN 2.4*      K 4.2   CO2 22   BUN 19.9   CREATININE 1.05   ALKPHOS 68   ALT 7   AST 9   BILITOT 0.2     CRP:   Recent Labs   Lab 11/21/22  1705   .00*     ESR:   Recent Labs   Lab 11/21/22  1704   SEDRATE 30*       All pertinent lab results from the last 24 hours have been reviewed.    Significant Imaging:  Imaging results within the past 24 hours have been reviewed.      Assessment/Plan:     Acute polyarticular arthritis:  Acute polyarticular arthritis for 1 week, synovitis in left wrist, bilateral ankles and right knee on exam.  Elevated ESR and CRP.  Differential diagnosis include reactive arthritis, viral arthritis versus  gonococcal arthritis versus rheumatoid arthritis.  Reactive arthritis high in the differential as he had bloody diarrhea 2 weeks back.  His presentation not typical for gout, uric acid level normal. RPR positive but this is not the presentation for syphilis arthritis, it commonly presents as chronic arthritis than acute arthritis.  HIV negative.    - BASIL, rheumatoid factor, HLA B27 pending.    - Blood cultures pending.    - Recommend echocardiogram, acute hepatitis panel, checking for EBV and parvovirus for completion.    - Recommend stool cultures and urine toxicology screen.    - Antibiotic coverage per primary team.   - Recommend starting Naproxen 500 mg BID with PPI, and prednisone 30 mg daily to help with inflammation.     Plan discussed with primary team.     Thank you for your consult. I will follow-up with patient. Please contact us if you have any additional questions.    Kojo Palacios MD  Rheumatology  [facility] - [address] [place] [facility] Surg Telemetry

## 2022-11-22 NOTE — PLAN OF CARE
Records from [facility] and Dr. Contreras's office have been scanned into patient's chart and can be viewed in media manager.

## 2022-11-22 NOTE — PROCEDURES
"Trey Harper is a 42 y.o. male patient.    Temp: 99.3 °F (37.4 °C) (22)  Pulse: (!) 114 (22)  Resp: 20 (22 1055)  BP: (!) 145/93 (22)  SpO2: 96 % (22)  Weight: 85 kg (187 lb 6.4 oz) (22)  Height: 5' 9" (175.3 cm) (22)       Arthrocentesis    Date/Time: 2022 3:10 PM  Location procedure was performed: [facility] RHEUMATOLOGY  Performed by: Kojo Lopez MD  Authorized by: Kojo Lopez MD   Consent Done: Yes  Consent: Verbal consent obtained.  Risks and benefits: risks, benefits and alternatives were discussed  Consent given by: patient  Patient understanding: patient states understanding of the procedure being performed  Patient consent: the patient's understanding of the procedure matches consent given  Relevant documents: relevant documents present and verified  Site marked: the operative site was marked  Patient identity confirmed: name and   Time out: Immediately prior to procedure a "time out" was called to verify the correct patient, procedure, equipment, support staff and site/side marked as required.  Indications: joint swelling   Body area: knee  Local anesthesia used: no    Anesthesia:  Local anesthesia used: no    Patient sedated: no  Needle gauge: 21G.  Patient tolerance: Patient tolerated the procedure well with no immediate complications  Complications: No  Estimated blood loss (mL): 0  Specimens: No      After consent was obtained, using sterile technique the right knee was prepped and topical Gebauer's Ethyl Chloride was used as local anesthetic. The joint was entered in medial parapatellar approach and tried aspirate few times, unable to get any synovial fluid back and the needle withdrawn.  The procedure was well tolerated.  The patient is asked to continue to rest the joint for a few more days before resuming regular activities.  It may be more painful for the first 1-2 days.  Watch for fever, or increased swelling " or persistent pain in the joint.     11/22/2022

## 2022-11-22 NOTE — PLAN OF CARE
"PGY2 ADDENDUM:      Patient was seen in conjunction with Dr. García, agree with assessment and plan on initial IM H&P with included inclusions and addendum. Of note, the patient is a 42 y.o. male with PMH of GERD, syphilis infection (s/p treatment as latent infection) and chronic right knee pain He presented to [facility] ED on 11/21/2022 with a primary complaint of worsening joint pain and ankle swelling. Patient reports that he presented to an outside ED approximately 2 weeks ago with a chief complaint of bloody diarrhea associated with vomiting and undocumented fevers. He was discharged with a course of oral cipro and flagyl which he completed. Patient states that symptoms resolved and he had no further symptoms until 3 days prior to consult when he noted onset of swelling of left wrist and hand which was followed by swelling and pain of bilateral ankles 2 days prior to consult. He reports chronic knee pain from trauma/ surgery in childhood but states that he had no problems walking or bearing weight until 2 days prior when he noted increasing knee and hip pain on ambulation. Patient initially presented to his PCP, who instructed him to seek admission at [facility] for "ID services and retesting for HIV" He was also prescribed Bactrim for unclear reasons.    Of note, has history of syphilis diagnosed on routine STI screening with PCP 9/2022. He reports being treated at [facility] and being told he completed treatment. He also reports that he tested negative for HIV in the past, and was prescribed PREP in the past for prevention as he is in MSM population. Denies any recent travel history, outdoor/ hiking exposure, animal/ insect bites.     In the ED, patient was tachycardic to 125, other VS within normal limits. Labs significant for leukocytosis 13.4, sed rate 30, , syphilis antibody reactive. Internal Medicine consulted for admission.     On physical examination, he is awake, alert, no acute " distress. Sitting in wheelchair  Breath sounds clear to auscultation bilaterally  CVS: RRR, no murmurs.  Musculoskeletal: L wrist hand swelling, TTP; decreased ROM of R knee   Neuro exam limited by pain; patient was unable to stand d/t L hip pain, R knee pain  Strength 2/5 RLE; 5/5 in all other extremities; reflexes, sensation intact. Cerebellar exam unremarkable. Unable to assess gait d/t pain     Assessment & Plan:   Concern for reactive arthritis  L wrist, b/l ankle swelling possibly 2/2 above  Sepsis possibly 2/2 synovitis (SIRS 2/4)  History of syphilis infection (s/p treatment)  GERD  History of chronic R knee pain     -patient presents with SIRS 2/4 with elevated inflammatory markers. Hx of bloody diarrhea treated with cipro and flagyl approximately 2 weeks ago  -will initiate vancomycin and rocephin for possible synovial infection (start date 11/21/22)  Ordered infectious workup including chlamydia, gonorrhea, HIV, respiratory PCR, campylobacter currently pending  -x rays of affected areas: right knee, ankles and sacro iliac currently pending  -ordered HLA b27, rheumatoid factor, CCP, ANCA, BASIL   -history of penicillin treatment for latent syphilis infection. Consult case management for confirmation of treatment history    Stephanie Singh MD  Internal Medicine, PGY-2

## 2022-11-22 NOTE — PROGRESS NOTES
Pharmacokinetic Initial Assessment: IV Vancomycin    Assessment/Plan:    Initiate intravenous vancomycin with loading dose of 1250 mg once followed by a maintenance dose of vancomycin 1250 mg IV every 12 hours  Desired empiric serum trough concentration is 10 to 15 mcg/mL  Draw vancomycin trough level 60 min prior to fourth dose on 1/23/22 at approximately 1100  Pharmacy will continue to follow and monitor vancomycin.      Please contact pharmacy at extension [phone] with any questions regarding this assessment.     Thank you for the consult,   Angelica Kolb       Patient brief summary:  Trey Harper is a 42 y.o. male initiated on antimicrobial therapy with IV Vancomycin for treatment of suspected skin & soft tissue infection    Drug Allergies:   Review of patient's allergies indicates:  No Known Allergies    Actual Body Weight:   83.9 kg    Renal Function:   Estimated Creatinine Clearance: 89.9 mL/min (based on SCr of 1.07 mg/dL).,     Dialysis Method (if applicable):  N/A    CBC (last 72 hours):  Recent Labs   Lab Result Units 11/21/22  1704   WBC x10(3)/mcL 13.4*   Hgb gm/dL 14.3   Hct % 43.0   Platelet x10(3)/mcL 378   Mono % % 6.1   Eos % % 0.1   Basophil % % 0.2       Metabolic Panel (last 72 hours):  Recent Labs   Lab Result Units 11/21/22  1705   Sodium Level mmol/L 136   Potassium Level mmol/L 4.5   Chloride mmol/L 101   Carbon Dioxide mmol/L 23   Glucose Level mg/dL 117*   Blood Urea Nitrogen mg/dL 16.0   Creatinine mg/dL 1.07   Albumin Level gm/dL 2.9*   Bilirubin Total mg/dL 0.5   Alkaline Phosphatase unit/L 80   Aspartate Aminotransferase unit/L 10   Alanine Aminotransferase unit/L 10       Drug levels (last 3 results):  No results for input(s): VANCOMYCINRA, VANCORANDOM, VANCOMYCINPE, VANCOPEAK, VANCOMYCINTR, VANCOTROUGH in the last 72 hours.    Microbiologic Results:  Microbiology Results (last 7 days)       Procedure Component Value Units Date/Time    Chlamydia/GC, PCR [[id number]]   (Normal) Collected: 11/21/22 1805    Order Status: Completed Specimen: Urine Updated: 11/21/22 2107     Chlamydia trachomatis PCR Not Detected     N. gonorrhea PCR Not Detected    Narrative:      The Xpert CT/NG test, performed on the GeneXpert system is a qualitative in vitro real-time polymerase chain reaction (PCR) test for the automated detected and differentiation for genomic DNA from Chlamydia trachomatis (CT) and/or Neisseria gonorrhoeae (NG).    Blood Culture #2 **CANNOT BE ORDERED STAT** [[id number]] Collected: 11/21/22 1913    Order Status: Sent Specimen: Blood from Hand, Right Updated: 11/21/22 1937    Blood Culture #1 **CANNOT BE ORDERED STAT** [[id number]] Collected: 11/21/22 1913    Order Status: Sent Specimen: Blood from Arm, Right Updated: 11/21/22 1937

## 2022-11-23 VITALS
DIASTOLIC BLOOD PRESSURE: 89 MMHG | RESPIRATION RATE: 20 BRPM | TEMPERATURE: 97 F | BODY MASS INDEX: 27.7 KG/M2 | WEIGHT: 187 LBS | OXYGEN SATURATION: 97 % | HEART RATE: 101 BPM | HEIGHT: 69 IN | SYSTOLIC BLOOD PRESSURE: 135 MMHG

## 2022-11-23 LAB
BASOPHILS # BLD AUTO: 0.01 X10(3)/MCL (ref 0–0.2)
BASOPHILS NFR BLD AUTO: 0.1 %
EOSINOPHIL # BLD AUTO: 0 X10(3)/MCL (ref 0–0.9)
EOSINOPHIL NFR BLD AUTO: 0 %
ERYTHROCYTE [DISTWIDTH] IN BLOOD BY AUTOMATED COUNT: 13.9 % (ref 11.5–17)
HCT VFR BLD AUTO: 36.5 % (ref 42–52)
HGB BLD-MCNC: 12.1 GM/DL (ref 14–18)
HLA TYP COMM-IMP: NORMAL
HLA-B27 QL FC: POSITIVE
IMM GRANULOCYTES # BLD AUTO: 0.03 X10(3)/MCL (ref 0–0.04)
IMM GRANULOCYTES NFR BLD AUTO: 0.3 %
LYMPHOCYTES # BLD AUTO: 1.17 X10(3)/MCL (ref 0.6–4.6)
LYMPHOCYTES NFR BLD AUTO: 10.7 %
MCH RBC QN AUTO: 27.3 PG (ref 27–31)
MCHC RBC AUTO-ENTMCNC: 33.2 MG/DL (ref 33–36)
MCV RBC AUTO: 82.4 FL (ref 80–94)
MONOCYTES # BLD AUTO: 0.78 X10(3)/MCL (ref 0.1–1.3)
MONOCYTES NFR BLD AUTO: 7.1 %
NEUTROPHILS # BLD AUTO: 8.9 X10(3)/MCL (ref 2.1–9.2)
NEUTROPHILS NFR BLD AUTO: 81.8 %
NRBC BLD AUTO-RTO: 0 %
PLATELET # BLD AUTO: 346 X10(3)/MCL (ref 130–400)
PMV BLD AUTO: 10 FL (ref 7.4–10.4)
RBC # BLD AUTO: 4.43 X10(6)/MCL (ref 4.7–6.1)
VANCOMYCIN TROUGH SERPL-MCNC: 8.3 UG/ML (ref 15–20)
WBC # SPEC AUTO: 10.9 X10(3)/MCL (ref 4.5–11.5)

## 2022-11-23 PROCEDURE — 96365 THER/PROPH/DIAG IV INF INIT: CPT | Mod: 59

## 2022-11-23 PROCEDURE — 85025 COMPLETE CBC W/AUTO DIFF WBC: CPT | Performed by: STUDENT IN AN ORGANIZED HEALTH CARE EDUCATION/TRAINING PROGRAM

## 2022-11-23 PROCEDURE — 63600175 PHARM REV CODE 636 W HCPCS: Performed by: INTERNAL MEDICINE

## 2022-11-23 PROCEDURE — 97162 PT EVAL MOD COMPLEX 30 MIN: CPT

## 2022-11-23 PROCEDURE — 97110 THERAPEUTIC EXERCISES: CPT

## 2022-11-23 PROCEDURE — 87045 FECES CULTURE AEROBIC BACT: CPT | Performed by: STUDENT IN AN ORGANIZED HEALTH CARE EDUCATION/TRAINING PROGRAM

## 2022-11-23 PROCEDURE — 96367 TX/PROPH/DG ADDL SEQ IV INF: CPT

## 2022-11-23 PROCEDURE — 99214 PR OFFICE/OUTPT VISIT, EST, LEVL IV, 30-39 MIN: ICD-10-PCS | Mod: GC,,, | Performed by: INTERNAL MEDICINE

## 2022-11-23 PROCEDURE — 25000003 PHARM REV CODE 250: Performed by: STUDENT IN AN ORGANIZED HEALTH CARE EDUCATION/TRAINING PROGRAM

## 2022-11-23 PROCEDURE — 25000003 PHARM REV CODE 250

## 2022-11-23 PROCEDURE — 30000890 MAYO GENERIC ORDERABLE: Performed by: INTERNAL MEDICINE

## 2022-11-23 PROCEDURE — 63600175 PHARM REV CODE 636 W HCPCS: Performed by: STUDENT IN AN ORGANIZED HEALTH CARE EDUCATION/TRAINING PROGRAM

## 2022-11-23 PROCEDURE — 96366 THER/PROPH/DIAG IV INF ADDON: CPT

## 2022-11-23 PROCEDURE — 97535 SELF CARE MNGMENT TRAINING: CPT

## 2022-11-23 PROCEDURE — 96372 THER/PROPH/DIAG INJ SC/IM: CPT | Performed by: STUDENT IN AN ORGANIZED HEALTH CARE EDUCATION/TRAINING PROGRAM

## 2022-11-23 PROCEDURE — 25000003 PHARM REV CODE 250: Performed by: INTERNAL MEDICINE

## 2022-11-23 PROCEDURE — 87798 DETECT AGENT NOS DNA AMP: CPT | Performed by: INTERNAL MEDICINE

## 2022-11-23 PROCEDURE — 97116 GAIT TRAINING THERAPY: CPT

## 2022-11-23 PROCEDURE — 36415 COLL VENOUS BLD VENIPUNCTURE: CPT | Performed by: STUDENT IN AN ORGANIZED HEALTH CARE EDUCATION/TRAINING PROGRAM

## 2022-11-23 PROCEDURE — 87799 DETECT AGENT NOS DNA QUANT: CPT | Mod: 59 | Performed by: INTERNAL MEDICINE

## 2022-11-23 PROCEDURE — 30000890 MAYO GENERIC ORDERABLE: Mod: 59 | Performed by: INTERNAL MEDICINE

## 2022-11-23 PROCEDURE — 96361 HYDRATE IV INFUSION ADD-ON: CPT

## 2022-11-23 PROCEDURE — 99214 OFFICE O/P EST MOD 30 MIN: CPT | Mod: GC,,, | Performed by: INTERNAL MEDICINE

## 2022-11-23 PROCEDURE — 80202 ASSAY OF VANCOMYCIN: CPT | Performed by: STUDENT IN AN ORGANIZED HEALTH CARE EDUCATION/TRAINING PROGRAM

## 2022-11-23 PROCEDURE — 97166 OT EVAL MOD COMPLEX 45 MIN: CPT

## 2022-11-23 PROCEDURE — G0378 HOSPITAL OBSERVATION PER HR: HCPCS

## 2022-11-23 RX ORDER — NAPROXEN 500 MG/1
500 TABLET ORAL 2 TIMES DAILY
Qty: 14 TABLET | Refills: 0 | Status: SHIPPED | OUTPATIENT
Start: 2022-11-23 | End: 2022-11-30

## 2022-11-23 RX ORDER — NAPROXEN 500 MG/1
500 TABLET ORAL 2 TIMES DAILY
Qty: 14 TABLET | Refills: 0 | Status: SHIPPED | OUTPATIENT
Start: 2022-11-23 | End: 2022-11-23 | Stop reason: SDUPTHER

## 2022-11-23 RX ORDER — DIPHENHYDRAMINE HCL 25 MG
25 CAPSULE ORAL ONCE
Status: COMPLETED | OUTPATIENT
Start: 2022-11-23 | End: 2022-11-23

## 2022-11-23 RX ORDER — PREDNISONE 10 MG/1
TABLET ORAL
Qty: 42 TABLET | Refills: 0 | Status: SHIPPED | OUTPATIENT
Start: 2022-11-23 | End: 2022-12-05 | Stop reason: SDUPTHER

## 2022-11-23 RX ORDER — PANTOPRAZOLE SODIUM 40 MG/1
40 TABLET, DELAYED RELEASE ORAL DAILY
Qty: 30 TABLET | Refills: 3 | Status: SHIPPED | OUTPATIENT
Start: 2022-11-23 | End: 2022-12-05 | Stop reason: SDUPTHER

## 2022-11-23 RX ADMIN — DIPHENHYDRAMINE HYDROCHLORIDE 25 MG: 25 CAPSULE ORAL at 02:11

## 2022-11-23 RX ADMIN — NAPROXEN 500 MG: 250 TABLET ORAL at 09:11

## 2022-11-23 RX ADMIN — VANCOMYCIN HYDROCHLORIDE 1500 MG: 1 INJECTION, POWDER, LYOPHILIZED, FOR SOLUTION INTRAVENOUS at 01:11

## 2022-11-23 RX ADMIN — PANTOPRAZOLE SODIUM 40 MG: 40 TABLET, DELAYED RELEASE ORAL at 09:11

## 2022-11-23 RX ADMIN — ENOXAPARIN SODIUM 40 MG: 40 INJECTION SUBCUTANEOUS at 09:11

## 2022-11-23 RX ADMIN — PREDNISONE 30 MG: 20 TABLET ORAL at 09:11

## 2022-11-23 RX ADMIN — CEFTRIAXONE 2 G: 2 INJECTION, POWDER, FOR SOLUTION INTRAMUSCULAR; INTRAVENOUS at 11:11

## 2022-11-23 NOTE — PROGRESS NOTES
Pharmacokinetic Assessment Follow Up: IV Vancomycin    Vancomycin serum concentration assessment(s):    The trough level was drawn correctly and can be used to guide therapy at this time. The measurement is below the desired definitive target range of 10 to 15 mcg/mL.    Vancomycin Regimen Plan:    Change regimen to Vancomycin 1500 mg IV every 12 hours with next serum trough concentration measured at 1200 prior to 1300 dose on 11/24/22    Drug levels (last 3 results):  Recent Labs   Lab Result Units 11/23/22  1102   Vancomycin Trough ug/ml 8.3*       Pharmacy will continue to follow and monitor vancomycin.    Please contact pharmacy at extension [phone] for questions regarding this assessment.    Thank you for the consult,   Janette Palomares       Patient brief summary:  Trey Harper is a 42 y.o. male initiated on antimicrobial therapy with IV Vancomycin for treatment of skin & soft tissue infection    The patient's current regimen is 1500mg q12h    Drug Allergies:   Review of patient's allergies indicates:  No Known Allergies    Actual Body Weight:   84.8kg    Renal Function:   Estimated Creatinine Clearance: 91.6 mL/min (based on SCr of 1.05 mg/dL).,     CBC (last 72 hours):  Recent Labs   Lab Result Units 11/21/22  1704 11/22/22  0133 11/23/22  0422   WBC x10(3)/mcL 13.4* 10.0 10.9   Hgb gm/dL 14.3 11.4* 12.1*   Hct % 43.0 34.1* 36.5*   Platelet x10(3)/mcL 378 304 346   Mono % % 6.1 9.7 7.1   Eos % % 0.1 0.2 0.0   Basophil % % 0.2 0.4 0.1       Metabolic Panel (last 72 hours):  Recent Labs   Lab Result Units 11/21/22  1705 11/22/22  0133   Sodium Level mmol/L 136 137   Potassium Level mmol/L 4.5 4.2   Chloride mmol/L 101 105   Carbon Dioxide mmol/L 23 22   Glucose Level mg/dL 117* 136*   Blood Urea Nitrogen mg/dL 16.0 19.9   Creatinine mg/dL 1.07 1.05   Albumin Level gm/dL 2.9* 2.4*   Bilirubin Total mg/dL 0.5 0.2   Alkaline Phosphatase unit/L 80 68   Aspartate Aminotransferase unit/L 10 9   Alanine  Aminotransferase unit/L 10 7   Magnesium Level mg/dL  --  2.30   Phosphorus Level mg/dL  --  4.3       Vancomycin Administrations:  vancomycin given in the last 96 hours                     vancomycin (VANCOCIN) 1,250 mg in sodium chloride 0.9% 250 mL IVPB (mg) 1,250 mg New Bag 11/22/22 2334     1,250 mg New Bag  1215     1,250 mg New Bag  0052                    Microbiologic Results:  Microbiology Results (last 7 days)       Procedure Component Value Units Date/Time    Stool Culture [[id number]] Collected: 11/23/22 0935    Order Status: Sent Specimen: Stool Updated: 11/23/22 0946    Blood Culture [[id number]] Collected: 11/22/22 0132    Order Status: Resulted Specimen: Blood from Arm, Left Updated: 11/22/22 0202    Blood Culture [[id number]] Collected: 11/22/22 0140    Order Status: Resulted Specimen: Blood from Hand, Right Updated: 11/22/22 0202    Chlamydia/GC, PCR [[id number]]  (Normal) Collected: 11/21/22 1805    Order Status: Completed Specimen: Urine Updated: 11/21/22 2107     Chlamydia trachomatis PCR Not Detected     N. gonorrhea PCR Not Detected    Narrative:      The Xpert CT/NG test, performed on the GeneXpert system is a qualitative in vitro real-time polymerase chain reaction (PCR) test for the automated detected and differentiation for genomic DNA from Chlamydia trachomatis (CT) and/or Neisseria gonorrhoeae (NG).    Blood Culture #2 **CANNOT BE ORDERED STAT** [[id number]] Collected: 11/21/22 1913    Order Status: Resulted Specimen: Blood from Hand, Right Updated: 11/21/22 1937    Blood Culture #1 **CANNOT BE ORDERED STAT** [[id number]] Collected: 11/21/22 1913    Order Status: Resulted Specimen: Blood from Arm, Right Updated: 11/21/22 1937

## 2022-11-23 NOTE — PT/OT/SLP EVAL
Physical Therapy Evaluation    Patient Name:  Trey Harper   MRN:  [id number]    Recommendations:     Discharge Recommendations:   (home w/ responsible caregiver w/ OPPT)   Discharge Equipment Recommendations: cane, straight   Barriers to discharge: None    Assessment:     Trey Harper is a 42 y.o. male admitted with a medical diagnosis of     Concern for reactive arthritis  L wrist, b/l ankle swelling possibly 2/2 above  Sepsis possibly 2/2 synovitis (SIRS 2/4)  History of syphilis infection (s/p treatment)  GERD  History of chronic R knee pain   Patient Active Problem List   Diagnosis    Acute pain of left wrist    Polyarthralgia    Tachycardia    SIRS (systemic inflammatory response syndrome)     He presents with the following impairments/functional limitations:  impaired endurance, gait instability, impaired balance, decreased lower extremity function, pain.    Rehab Prognosis: Good; patient would benefit from acute skilled PT services to address these deficits and reach maximum level of function.    -continued supervised OOB and ambulation w/ straight cane w/ progression of gait distance/frequency/duration as tolerated/appropriate (as ordered by MSARIKA)    Recent Surgery: * No surgery found *      Plan:     During this hospitalization, patient to be seen  (3-5 TIMES/WEEK) to address the identified rehab impairments via gait training, therapeutic activities, therapeutic exercises, neuromuscular re-education and progress toward the following goals:    Plan of Care Expires:  12/21/22    Subjective     Chief Complaint: pain Rt knee and Lt wrist  Patient/Family Comments/goals: return home to PLOF  Pain/Comfort:  Pain Rating 1: 5/10  Location - Side 1: Left  Location 1: wrist  Pain Addressed 1: Nurse notified  Pain Rating Post-Intervention 1: 6/10  Pain Rating 2: 5/10  Location - Side 2: Right  Location 2: knee  Pain Addressed 2: Nurse notified  Pain Rating Post-Intervention 2: 6/10    Patients cultural,  spiritual, Yazidi conflicts given the current situation:      Living Environment:  -discharging to mothers mobile home w/ 4 steps w/ bilat handrails w/ tub/shower combo  Prior to admission, patients level of function was required assist w/ ADL's and self care. Patient drives, does not work and does not perform household chores.  Equipment used at home: cane, straight.  DME owned (not currently used): none.  Upon discharge, patient will have assistance from mother.    Patient is Rt hand dominant  Patient denied: swallowing difficulty; lightheadedness/dizziness; extremity tingling/numbness except Lt fingers; 'new' vision impairment  Patient pain complaints as documented    Objective:     Communicated with pt's nurse Sandy prior to session.  Patient found sitting edge of bed with telemetry, peripheral IV  upon PT entry to room.    General Precautions: Standard, fall   Orthopedic Precautions:Full weight bearing   Braces: N/A  Respiratory Status: Room air    Exams:  Cognitive Exam:  Patient is oriented to Person, Place, Time, and Situation  Gross Motor Coordination:  bilat toe taps WFL's but slowed  Sensation:    -       Intact  light/touch bilat/distal LE  RLE ROM: WFL  RLE Strength: WFL (NOTE- patient complaint of lateral ankle pain when hamstrings MMT'ed)  LLE ROM: WFL  LLE Strength: WFL    Functional Mobility:  Transfers:     Sit to Stand:  modified independence with straight cane  Gait: supervision w/ straight cane  -widened base of support  -shortened/quickened step on Lt  -decreased upright posture  -slowed/guarded mvmts  -unsteadiness w/o loss of balance or mis-steps    ADDITIONAL TREATMENT SESSION  GAIT x1 - 9 minutes  130ft x1 w/ straight cane w/ supervision w/ steps negotiation  Stairs:  Pt ascended/descended 4 steps with No Assistive Device with bilateral handrails with Supervision or Set-up Assistance   Gait pattern as documented previously  Unsteadiness w/o loss of balance or mis-steps    Balance  Training  Static Sitting/Standing:  Patient performed static sitting on level surface  using  no device  with [place] and  no  verbal cues.     Dynamic Sitting/Standing:  Patient performed dynamic sitting on level surface using  no device  with [place] and  no  verbal cues during moderate excursions.    Balance Training  Static Sitting/Standing:  Patient performed static standing on level surface  using  straight cane  with Modified [place] and  no  verbal cues.     Vitals   Vitals at Rest  BP  142/91   HR  93   O2 Sat  99%      Vitals With Activity  BP  150/99   HR  103   O2 Sat  99%     Patient left sitting edge of bed with all lines intact, call button in reach, pt's nurse Sandy notified, pt's mother and male visitor present, and tray table at bedside .    GOALS:   Multidisciplinary Problems       Physical Therapy Goals          Problem: Physical Therapy    Goal Priority Disciplines Outcome Goal Variances Interventions   Physical Therapy Goal     PT, PT/OT      Description: Goals to be met by: DISCHARGE     Patient will increase functional independence with mobility by performing:    -. Supine to sit with [place]  -. Sit to supine with [place]  -. Rolling to Left and Right with [place]  -. Sit to stand transfer with [place]  -. Gait  x 260 feet with Modified [place] using Single-point Cane  -. Ascend/descend 4 stair with bilateral Handrails Modified [place] using No Assistive Device                     History:     No past medical history on file.    No past surgical history on file.    Time Tracking:     PT Received On: 11/23/22  PT Start Time: 1428     PT Stop Time: 1459  PT Total Time (min): 31 min     Billable Minutes: Evaluation 22 and Gait Training 9      11/23/2022

## 2022-11-23 NOTE — MEDICAL/APP STUDENT
[facility]  CLINIC NOTE    Patient Name: Trey Harper  YOB: 1980    PRESENTING HISTORY       History of Present Illness:  Mr. Trey Harper is a 42 y.o. male w/ past medical history of GERD and chronic right knee pain s/p trauma and surgery who presents to ED on 11/21 complaining of left wrist and hand swelling with bilateral foot and ankle swelling with tenderness on palpation. Thursday he noticed left arm swelling, then noticed foot and ankle swelling the next day. He also reported pain with movement of these swelling extremities. He reports no noticed insect bites, wounds, or brackish water exposure. He has had a positive syphilis test in the past and he has completed penicillin treatment for that problem. He had bloody diarrhea 2 weeks ago and went to the ED where he was given cipro and flagyl, after which his diarrhea resolved. Today, he reports that the pain is better, swelling decreased slightly, and he has some increased mobility. He denies fever or chills, numbness or tingling, or any bulbar symptoms, as he denies dysphagia, diplopia, or dysarthria. Yesterday, an arthrocentesis was not able to aspirate any synovial fluid.    ROS  General: No fever or chills, headaches, blurry vision, has normal sleep and eating habits  Cardiovascular: no chest pain or palpitations  Pulmonary: No shortness of breath  Neurologic: No numbness or tingling, headaches, blurry vision, diplopia, dysarthria, or dysphagia  GI: normal bowel habits, no nausea or vomiting, or dysphagia  HEENT: No nasal, auditory, or throat related symptoms reported      PAST HISTORY:     No past medical history on file.    No past surgical history on file.    No family history on file.    Social History     Socioeconomic History    Marital status: Single       MEDICATIONS & ALLERGIES:     No current facility-administered medications on file prior to encounter.     Current Outpatient Medications on File  "Prior to Encounter   Medication Sig    pantoprazole (PROTONIX) 40 MG tablet Take 40 mg by mouth once daily.       Review of patient's allergies indicates:  No Known Allergies    OBJECTIVE:   Vital Signs:  Vitals:    11/21/22 2110 11/21/22 2117 11/22/22 0032 11/22/22 0415   BP:  (!) 146/85 125/78 138/82   Pulse:  104 91 101   Resp:       Temp:  98.3 °F (36.8 °C) 98 °F (36.7 °C) 98 °F (36.7 °C)   TempSrc:  Oral Oral Oral   SpO2:  99% 98% 98%   Weight: 85 kg (187 lb 6.4 oz)      Height: 5' 9" (1.753 m)       11/22/22 0730 11/22/22 0950 11/22/22 1049 11/22/22 1055   BP: (!) 149/104      Pulse: 101      Resp: 19 20 20 20   Temp: 99 °F (37.2 °C)      TempSrc: Oral      SpO2: 99%      Weight:       Height:        11/22/22 1106 11/22/22 1524 11/22/22 1953 11/22/22 2333   BP: (!) 145/93 (!) 137/90 127/86 120/76   Pulse: (!) 114 (!) 115 107 86   Resp:   18 18   Temp: 99.3 °F (37.4 °C) 99.6 °F (37.6 °C) 98.3 °F (36.8 °C) 97.9 °F (36.6 °C)   TempSrc: Oral Oral Oral Oral   SpO2: 96% 97% 98% 98%   Weight:  84.8 kg (187 lb)     Height:  5' 9" (1.753 m)      11/23/22 0308 11/23/22 0705 11/23/22 1142   BP: 133/86 138/81 134/75   Pulse: 101 97 94   Resp: 18  18   Temp: 97.4 °F (36.3 °C) 97.7 °F (36.5 °C) 97.7 °F (36.5 °C)   TempSrc: Oral Oral Oral   SpO2: 100% 99% 99%   Weight:      Height:          Recent Results (from the past 24 hour(s))   Hepatitis Panel, Acute    Collection Time: 11/22/22  2:58 PM   Result Value Ref Range    Hepatitis A IgM Nonreactive Nonreactive    Hepatitis B Core IgM Nonreactive Nonreactive    Hepatitis B Surface Antigen Nonreactive Nonreactive    Hepatitis C Antibody Nonreactive Nonreactive   Echo    Collection Time: 11/22/22  3:24 PM   Result Value Ref Range    BSA 2.03 m2    TDI SEPTAL 0.20 m/s    LV LATERAL E/E' RATIO 4.13 m/s    LV SEPTAL E/E' RATIO 4.95 m/s    Right Atrial Pressure (from IVC) 3 mmHg    EF 64 %    Left Ventricular Outflow Tract Mean Velocity 0.73 cm/s    Left Ventricular Outflow Tract " Mean Gradient 2.34 mmHg    TDI LATERAL 0.24 m/s    LVIDd 4.24 3.5 - 6.0 cm    IVS 1.00 0.6 - 1.1 cm    Posterior Wall 0.97 0.6 - 1.1 cm    LVIDs 2.97 2.1 - 4.0 cm    FS 30 28 - 44 %    LV mass 136.43 g    LA size 2.99 cm    RVDD 2.53 cm    Left Ventricle Relative Wall Thickness 0.46 cm    AV mean gradient 4 mmHg    AV valve area 2.98 cm2    AV Velocity Ratio 0.78     AV index (prosthetic) 0.81     MV mean gradient 3 mmHg    MV valve area p 1/2 method 7.07 cm2    MV valve area by continuity eq 2.63 cm2    E/A ratio 0.82     Mean e' 0.22 m/s    E wave deceleration time 107.24 msec    LVOT diameter 2.16 cm    LVOT area 3.7 cm2    LVOT peak clark 1.00 m/s    LVOT peak VTI 15.60 cm    Ao peak clark 1.29 m/s    Ao VTI 19.2 cm    LVOT stroke volume 57.13 cm3    AV peak gradient 7 mmHg    MV peak gradient 7 mmHg    E/E' ratio 4.50 m/s    MV Peak E Clark 0.99 m/s    MV VTI 21.7 cm    MV stenosis pressure 1/2 time 31.10 ms    MV Peak A Clark 1.21 m/s    LV Systolic Volume 34.24 mL    LV Systolic Volume Index 17.0 mL/m2    LV Diastolic Volume 80.39 mL    LV Diastolic Volume Index 40.00 mL/m2    LV Mass Index 68 g/m2    Ao root annulus 3.50 cm    LA Volume Index (Mod) 19.9 mL/m2    LA volume (mod) 40.00 cm3   CBC with Differential    Collection Time: 11/23/22  4:22 AM   Result Value Ref Range    WBC 10.9 4.5 - 11.5 x10(3)/mcL    RBC 4.43 (L) 4.70 - 6.10 x10(6)/mcL    Hgb 12.1 (L) 14.0 - 18.0 gm/dL    Hct 36.5 (L) 42.0 - 52.0 %    MCV 82.4 80.0 - 94.0 fL    MCH 27.3 27.0 - 31.0 pg    MCHC 33.2 33.0 - 36.0 mg/dL    RDW 13.9 11.5 - 17.0 %    Platelet 346 130 - 400 x10(3)/mcL    MPV 10.0 7.4 - 10.4 fL    Neut % 81.8 %    Lymph % 10.7 %    Mono % 7.1 %    Eos % 0.0 %    Basophil % 0.1 %    Lymph # 1.17 0.6 - 4.6 x10(3)/mcL    Neut # 8.9 2.1 - 9.2 x10(3)/mcL    Mono # 0.78 0.1 - 1.3 x10(3)/mcL    Eos # 0.00 0 - 0.9 x10(3)/mcL    Baso # 0.01 0 - 0.2 x10(3)/mcL    IG# 0.03 0 - 0.04 x10(3)/mcL    IG% 0.3 %    NRBC% 0.0 %   VANCOMYCIN, TROUGH     Collection Time: 11/23/22 11:02 AM   Result Value Ref Range    Vancomycin Trough 8.3 (L) 15.0 - 20.0 ug/ml         Physical Exam  Heart: Regular rate and rhythm  Lungs: Clear to auscultation bilaterally  Abdominal: Nontender and nondistended  Extremities: Swelling improved, improved mobility, and improved pain  Neck: Thyroid midline; Left sided slightly swollen, nontender lymph node, behind SCM  CN: Intact  Laboratory  Lab Results   Component Value Date    WBC 10.9 11/23/2022    HGB 12.1 (L) 11/23/2022    HCT 36.5 (L) 11/23/2022    MCV 82.4 11/23/2022     11/23/2022     No results for input(s): GLU, NA, K, CL, CO2, BUN, CREATININE, CALCIUM, MG in the last 24 hours.  No results found for: INR, PROTIME  No results found for: HGBA1C  No results for input(s): POCTGLUCOSE in the last 72 hours.    Diagnostic Results:  Arthrocentesis: Yesterday unable to aspirate any synovial fluid.  CRP: 300 (11/21/22)  HIV negative    ASSESSMENT & PLAN:     [unfilled]    42 year old male with PMHx of GERD and chronic right knee pain s/p trauma and surgery presents to [facility] with left wrist and hand swelling and pain with limited mobility and bilateral foot and ankle swelling with pain and limited mobility that has improved since yesterday.  -continue monitoring of pain and swelling  -monitor vitals  -continue pain medications (naproxen)  -continue prednisone treatment  -Refer to PT  -advise rest    Discussed with Dr. Duncan - staff attestation to follow    Christopher Saucedo, L3

## 2022-11-23 NOTE — PT/OT/SLP EVAL
"Occupational Therapy   Evaluation and Discharge Note    Name: Trey Harper  MRN: [id number]  Admitting Diagnosis:    Patient Active Problem List   Diagnosis    Acute pain of left wrist    Polyarthralgia    Tachycardia    SIRS (systemic inflammatory response syndrome)       Recent Surgery: * No surgery found *      Recommendations:     Discharge Recommendations: home, other (see comments) (w/ caregiver)  Discharge Equipment Recommendations:  other (see comments) (OT discussed tub transfer bench and support rails for commode to pt/pt's mom.  Pt's mom stated they "might" see about purchasing.)  Barriers to discharge:  None    Assessment:     Trey Harper is a 42 y.o. male with a medical diagnosis of   Patient Active Problem List   Diagnosis    Acute pain of left wrist    Polyarthralgia    Tachycardia    SIRS (systemic inflammatory response syndrome)     . At this time, patient is functioning at their prior level of function and does not require further acute OT services.     Plan:     During this hospitalization, patient does not require further acute OT services.  Please re-consult if situation changes.    Plan of Care Reviewed with: patient, mother    Subjective     Chief Complaint: pain  Patient/Family Comments/goals: none stated    Occupational Profile:  Living Environment: Pt does live in trailer w/ 5 steps to enter, no hand rails.  Pt has been staying at his mom's single story home, 4 steps to enter, B rails, tub/shower combo.  Previous level of function: Pt stated he was requiring great A from his mom for self care skills prior to admit but able to perform more on his own since yesterday.  He does drive, doesn't work, mom performed meal prep and home making skills.  Equipment Used at home:  cane, straight  Assistance upon Discharge: family care as needed    Pain/Comfort:  Pain Rating 1: 5/10  Location - Side 1: Left  Location 1: wrist (hand)  Pain Addressed 1: Nurse notified  Pain Rating " "Post-Intervention 1: 5/10  Pain Rating 2: 5/10  Location - Side 2: Right  Location 2: knee  Pain Addressed 2: Nurse notified  Pain Rating Post-Intervention 2: 5/10    Patients cultural, spiritual, Holiness conflicts given the current situation: no    Objective:     Communicated with: nurse Delgado prior to session.  Patient found supine with telemetry, peripheral IV upon OT entry to room.    General Precautions: Standard,     Orthopedic Precautions:N/A   Braces: N/A  Respiratory Status: Room air     Occupational Performance:    Bed Mobility:    Patient completed Rolling/Turning to Left with  modified independence  Patient completed Rolling/Turning to Right with modified independence  Patient completed Scooting/Bridging with modified independence  Patient completed Supine to Sit with modified independence  Patient completed Sit to Supine with modified independence    Functional Mobility/Transfers:  Patient completed Sit <> Stand Transfer with modified independence  with  straight cane   Functional Mobility: pt side stepping at BS I'ly with no A device.    Activities of Daily Living:  Lower Body Dressing: independence donning/doffing B non slip socks    Cognitive/Visual Perceptual:  Cognitive/Psychosocial Skills:     -       Oriented to: Person, Place, Time, and Situation   -       Safety awareness/insight to disability: intact     Physical Exam:  Balance:    Edema:  Moderate L wrist/hand  Dominant hand:    Upper Extremity Strength:    -       Right Upper Extremity: WNL  -       Left Upper Extremity: proximally F+/G-, distally P/P+, "stiffness" noted   Fine Motor Coordination:  intact RUE, limitations LUE secondary to "stiffness"    Treatment & Education:  Pt issued and performed HEP with return demo, voiced understanding.  Pt also issued squeeze ball.  OT discussed DME recs for use upon D/C (tub transfer bench and support rails).  Pt's mom stated they "might" purchase.    Patient left sitting edge of bed with all " lines intact, call button in reach, nurse notified, and pt's mom and mom's friend present    GOALS:   Multidisciplinary Problems       Occupational Therapy Goals       OT eval completed.  No goals set.  HEP issued to pt.                    History:     No past medical history on file.    No past surgical history on file.    Time Tracking:     OT Date of Treatment: 11/23/22  OT Start Time: 1315  OT Stop Time: 1419  OT Total Time (min): 64 min    Billable Minutes:Evaluation 30 min  Self Care/Home Management 10 min  Therapeutic Exercise 24 min    11/23/2022

## 2022-11-23 NOTE — PLAN OF CARE
New HH consult with PT noted. Patient is in agreement with no preference. Multiple referrals sent via [facility]. Will follow.

## 2022-11-23 NOTE — PT/OT/SLP DISCHARGE
POST DISCHARGE DOCUMENTATION - 1703-11/23/2022    Physical Therapy Discharge Summary    Name: Trey Harper  MRN: [id number]   Principal Problem: Acute pain of left wrist        Concern for reactive arthritis  L wrist, b/l ankle swelling possibly 2/2 above  Sepsis possibly 2/2 synovitis (SIRS 2/4)  History of syphilis infection (s/p treatment)  GERD  History of chronic R knee pain       Patient Active Problem List   Diagnosis    Acute pain of left wrist    Polyarthralgia    Tachycardia    SIRS (systemic inflammatory response syndrome)     Patient Discharged from acute Physical Therapy on 11/23/2022.  Please refer to prior PT noted date on 11/23/2022 (EVALUATION) for functional status.     Assessment:     Patient was discharged unexpectedly.  Information required to complete an accurate discharge summary is unknown.  Refer to therapy initial evaluation and last progress note for initial and most recent functional status and goal achievement.  Recommendations made may be found in medical record. Patient has not met goals. Patient seen for evaluation only prior to hospital discharge    -continued supervised OOB and ambulation w/ straight cane w/ progression of gait distance/frequency/duration as tolerated/appropriate (as ordered by M.D.)    Objective:     GOALS: 0 out of 6 STG's met by patient 2/2 patient seen for evaluation only prior to hospital discharge    Multidisciplinary Problems       Physical Therapy Goals          Problem: Physical Therapy    Goal Priority Disciplines Outcome Goal Variances Interventions   Physical Therapy Goal     PT, PT/OT      Description: Goals to be met by: DISCHARGE     Patient will increase functional independence with mobility by performing:    -. Supine to sit with [place] - NOT MET  -. Sit to supine with [place] - NOT MET  -. Rolling to Left and Right with [place] - NOT MET  -. Sit to stand transfer with [place] - NOT MET  -. Gait  x 260 feet with Modified  [place] using Single-point Cane - NOT MET  -. Ascend/descend 4 stair with bilateral Handrails Modified [place] using No Assistive Device - NOT MET                       Reasons for Discontinuation of Therapy Services  Hospital discharge       Plan:     Patient Discharged to:  home or self care - per chart .    PT RECOMMENDATIONS - per evaluation 11/23/2022  Discharge Recommendations:   (home w/ responsible caregiver w/ OPPT)   Discharge Equipment Recommendations: cane, straight   Barriers to discharge: None      11/23/2022

## 2022-11-23 NOTE — PROGRESS NOTES
[facility]  Progress Note  Patient Name: Trey Harper  : 1980  MRN: [id number]  Admission Date: 2022  Attending Provider: James Duncan MD  Primary Care Physician: Prisca Contreras MD    Date of Consultation: 22    Consultation Requested By: Dr Duncan.    Reason for Consultation: Joint pain and swelling.     Subjective:   HPI:  42 year old male presented to ED complaining of pain and swelling in left wrist, left hand, bilateral ankles and right knee.  Symptoms started last Wednesday, 1 week back.  Started with pain and swelling in left wrist and progress to bilateral ankles and right knee.  Denies fevers or recent travel.  History of bloody diarrhea few weeks back and he went to ER for that.  He was treated with ciprofloxacin and Flagyl at the time.  Diarrhea has resolved now.  Denies history of rashes, oral or nasal or genital ulcers.   This is the first episode he had swelling and pain in multiple joints, never had similar issues in the past.  History of septic arthritis in right knee and he was 5 to 6 years old.     Per patient he tested positive for syphilis recently and he was treated for that with penicillin in 2022.     Today 22 he is feeling better.  No fever over night.  Joint pain and swelling is slightly better in left wrist, right knee and bilateral ankles.  Started prednisone 30 mg daily and naproxen 500 mg b.i.d..  Tolerating them well without any issues.     PMH: GERD and chronic right knee pain.     Denies fevers, rashes, oral and nasal ulcers, history of DVT or PE, history of stroke or seizures, history of MI, history of malignancies, Raynaud's phenomenon, history of inflammatory eye diseases, history of inflammatory bowel disease.        There is no immunization history on file for this patient.     Review of patient's allergies indicates:  No Known Allergies      Review of systems:   CONSTITUTIONAL: negative with no fatigue or fevers  SKIN:  negative with no recent rashes  EYES: negative with no complaints of dry irritated eyes  ENT: negative with no mouth dryness or sores  ENDO: negative with no hypothyroid symptoms  HEME: negative, with no history of anemia or DVT  CV: negative without exertional chest pain, palpitations, or edema  RESP: negative without cough, dyspnea, or pleuritic pain  GI: negative without nausea, vomiting, heartburn, or bleeding  NEURO: negative, with no imbalance and no numbness or tingling of the hands or feet  MUSCULOSKELETAL: as per HPI    Inpatient Medications:     Current Facility-Administered Medications:     cefTRIAXone (ROCEPHIN) 2 g in sodium chloride 0.9 % 50 mL IVPB (MB+), 2 g, Intravenous, Q24H, James Duncan MD, Stopped at 11/23/22 1222    dextrose 10% bolus 125 mL, 12.5 g, Intravenous, PRN, James Duncan MD    dextrose 10% bolus 250 mL, 25 g, Intravenous, PRN, James Duncan MD    enoxaparin injection 40 mg, 40 mg, Subcutaneous, Q12H, Devika García DO, 40 mg at 11/23/22 0905    glucagon (human recombinant) injection 1 mg, 1 mg, Intramuscular, PRN, Devika García DO    glucose chewable tablet 16 g, 16 g, Oral, PRN, Devika García DO    glucose chewable tablet 24 g, 24 g, Oral, PRN, Devika García DO    HYDROcodone-acetaminophen  mg per tablet 1 tablet, 1 tablet, Oral, Q6H PRN, Fantasma Negrete MD    HYDROmorphone injection 1 mg, 1 mg, Intravenous, Q6H PRN, Fantasma Negrete MD, 1 mg at 11/22/22 1055    ketorolac injection 15 mg, 15 mg, Intravenous, Q6H PRN, Fantasma Negrete MD, 15 mg at 11/22/22 1457    naloxone 0.4 mg/mL injection 0.02 mg, 0.02 mg, Intravenous, PRN, Devika García DO    naproxen tablet 500 mg, 500 mg, Oral, BID WM, Fantasma Negrete MD, 500 mg at 11/23/22 0905    pantoprazole EC tablet 40 mg, 40 mg, Oral, Daily, Fantasma Negrete MD, 40 mg at 11/23/22 0905    predniSONE tablet 30 mg, 30 mg, Oral, Daily, Kojo Lopez MD, 30 mg at 11/23/22 0905    sodium chloride 0.9% flush 10 mL, 10 mL, Intravenous,  Q12H PRN, Devika García DO    vancomycin (VANCOCIN) 1,500 mg in sodium chloride 0.9% 250 mL IVPB, 1,500 mg, Intravenous, Q12H, Devika García DO, Stopped at 11/23/22 1438    Pharmacy to dose Vancomycin consult, , , Once **AND** vancomycin - pharmacy to dose, , Intravenous, pharmacy to manage frequency, Devika García DO       Objective:     Vital Signs (Most Recent):  Temp: 97.3 °F (36.3 °C) (11/23/22 1502)  Pulse: 101 (11/23/22 1502)  Resp: 20 (11/23/22 1502)  BP: 135/89 (11/23/22 1502)  SpO2: 97 % (11/23/22 1502)  O2 Device (Oxygen Therapy): room air (11/21/22 2102)   Vital Signs (24h Range):  Temp:  [97.3 °F (36.3 °C)-98.3 °F (36.8 °C)] 97.3 °F (36.3 °C)  Pulse:  [] 101  Resp:  [18-20] 20  SpO2:  [97 %-100 %] 97 %  BP: (120-138)/(75-89) 135/89     Weight: 84.8 kg (187 lb) (11/22/22 1524)  Body mass index is 27.62 kg/m².  Body surface area is 2.03 meters squared.      Intake/Output Summary (Last 24 hours) at 11/23/2022 1543  Last data filed at 11/23/2022 0900  Gross per 24 hour   Intake 595 ml   Output 250 ml   Net 345 ml       Physical Exam:   General Appearance: no acute distress and cooperative  Skin: Skin color, texture, turgor normal. No rashes or lesions.  Eyes: conjunctivae/corneas clear. PERRL, EOM's intact.   ENT: No oral or nasal ulcers.  Neck:  Neck supple. No adenopathy. Thyroid symmetric, normal size and no nodules.  Lungs: CTA throughout without crackles, rhonchi, or wheezes.   Heart: RRR w/o S3, S4, or murmurs. The PMI is not displaced. No JVD or edema.  Abdomen: Soft, non-tender, no masses, organomegaly, rebound or guarding.  Neuro: CN II-XII GI, DTRs +2/4 throughout, sensory and motor innervation intact, no pathologic reflexes elicited.  Musculoskeletal:   Swelling and tenderness in left wrist with palpation.  Swelling of dorsum of left hand.  No tenderness in MCPs or PIP knees bilaterally.  Swelling, tenderness and large effusion of right knee.  Trace effusion of left knee.  Swelling,  tenderness and warmth of bilateral ankles, tenderness with range of motion of bilateral ankles.   Swelling and tenderness in joints better than before.    Significant Labs:    Blood cultures pending.    Blood Culture: No results for input(s): LABBLOO in the last 24 hours.  CBC:   Recent Labs   Lab 11/23/22  0422   WBC 10.9   HGB 12.1*   HCT 36.5*          CMP:   No results for input(s): GLU, CALCIUM, ALBUMIN, PROT, NA, K, CO2, CL, BUN, CREATININE, ALKPHOS, ALT, AST, BILITOT in the last 24 hours.    CRP:   No results for input(s): CRP in the last 24 hours.    ESR:   No results for input(s): SEDRATE in the last 24 hours.      All pertinent lab results from the last 24 hours have been reviewed.    Significant Imaging:     X-Ray Ankle 2 View Left   Final Result      Lateral soft tissue swelling.         Electronically signed by: Nirali Carvajal MD   Date:    11/22/2022   Time:    07:28      X-Ray Sacrum And Coccyx   Final Result      No acute findings.         Electronically signed by: Nirali Carvajal MD   Date:    11/22/2022   Time:    07:36      X-Ray Knee 3 View Right   Final Result      No acute findings.         Electronically signed by: Nirali Carvajal MD   Date:    11/22/2022   Time:    07:36      X-Ray Ankle 2 View Right   Final Result      No acute findings.         Electronically signed by: Nirali Carvajal MD   Date:    11/22/2022   Time:    07:29      X-Ray Wrist Complete Left   Final Result      1. No displaced fracture or dislocation.   2. Possible small avulsion injury at the distal tip of the ulnar styloid process.         Electronically signed by: Stef Rodriguez MD   Date:    11/21/2022   Time:    18:11          Assessment/Plan:     Acute asymmetric polyarticular arthritis:  Acute polyarticular arthritis for 1 week, synovitis in left wrist, bilateral ankles and right knee on exam.  Elevated ESR and CRP.  Differential diagnosis include reactive arthritis, viral arthritis versus gonococcal  arthritis versus rheumatoid arthritis.  Reactive arthritis high in the differential as he had bloody diarrhea 2 weeks back.  His presentation not typical for gout, uric acid level normal. RPR positive but this is not the presentation for syphilis arthritis, it commonly presents as chronic arthritis than acute arthritis.  HIV negative.     - BASIL, rheumatoid factor, HLA B27, EBV and parvovirus pending.    - Blood cultures pending.    - Acute hep panel negative. Echo was normal, no vegetations seen.   - Urine toxicology not done, will do it as outpatient.   - Antibiotic coverage per primary team.   - Synovitis better with Naproxen and prednisone.  - C/w Naproxen 500 mg BID for a week as scheduled and after that PRN. Naproxen should be taken with PPI.  - Prednisone taper 30 mg daily for 1 week, 20 mg daily for 1 more week and then 10 mg daily for another week and stop.     Plan discussed with primary team.      Thank you for your consult. Please contact us if you have any additional questions.     Kojo Lopez MD  Rheumatology  [facility]

## 2022-11-23 NOTE — DISCHARGE SUMMARY
[facility] Discharge Summary    Admitting Physician: James Duncan MD  Attending Physician: James Duncan MD  Date of Admit: 11/21/2022  Date of Discharge: 11/23/2022    Discharge to:  Home   Condition: Stable    Discharge Diagnoses     Patient Active Problem List   Diagnosis    Acute pain of left wrist    Polyarthralgia    Tachycardia    SIRS (systemic inflammatory response syndrome)       Consultants and Procedures     Consultants:  Consults (From admission, onward)          Status Ordering Provider     Inpatient consult to Social Work/Case Management  Once        Provider:  (Not yet assigned)    Completed SHIN MARTINEZ     Inpatient consult to Social Work/Case Management  Once        Provider:  (Not yet assigned)    Completed MARÍA CHUNG     Pharmacy to dose Vancomycin consult  Once        Provider:  (Not yet assigned)   [facility].    Acknowledged MARÍA CHUNG     Inpatient consult to Internal Medicine  Once        Provider:  Fantasma Negrete MD    Acknowledged ANGELICA MILES               Brief History of Present Illness   ear-old male with past medical history of GERD and chronic right knee pain status post trauma and surgery presented to the ED complaining of left wrist, hand swelling with bilateral foot and ankle swelling with tenderness on palpation.  He stated that he 1st noticed his left arm being swollen on Thursday and then progressively seen both foot and ankle being swollen the next day.  Patient stated that he went to visit his friend at his home in [place] on Thursday but other than that denies any recent travel.  Denies any animal bites, exposure with brackish water, neha nails in the last 1-2 weeks.  Denied any bulbar symptoms including dysphagia, dysarthria, diplopia at this time.  Patient stated that he was in the ED 2 weeks ago due to having bloody diarrhea where he was discharged with ciprofloxacin and Flagyl at  that time.  He stated that his diarrhea resolved and he stopped taking the antibiotics after that.  He stated that he was recently tested for syphilis in September and had completed 6 dose injections of penicillin to complete the treatment in October.  He stated that when he was seen at his PCP's office on Friday his PCP was worried about the patient having HIV given history of syphilis and started him on PrEP for prevention and was sent him home with Bactrim for his leg swelling.  He stated he stopped taking PrEP.  He also tested negative for HIV per the patient when his PCP checked him for HIV.  He stated came he came in today because his swelling in his legs has been getting worse.  Denies having any fever, chills, nausea, vomiting, abdominal pain, weakness, numbness or tingling in extremities, headache, neck stiffness, urinary incontinence, blurry vision at this time.     In the ED patient was found to have leukocytosis with a white count of 13.4 with tachycardia with heart rate of 125 with inflammatory markers elevated (sed rate of 30, CRP of 301).  X-ray of the right wrist shows possible small avulsion injury at the distal tip of the ulnar styloid process with no displaced fracture or dislocation.  Internal Medicine was consulted for further care and management.    Hospital Course with Pertinent Findings     Admitted for polyarticular joint pain with difficulty ambulating on his own.  Inflammatory markers and white count was elevated during admission.  Patient was started on antibiotics for concern for synovitis.  Imaging done while inpatient where fairly unremarkable.  Rheumatology was consulted who recommended starting patient on naproxen and prednisone while inpatient which helped to improve the patient's joint pain and swelling.  At the time of discharge patient is stable.  Denies any chest pain, shortness of breath, fever, chills, nausea, vomiting abdominal pain, generalized weakness.  Patient is able to  ambulate at this time.  PT and OT recommended discharging the patient with home health PT. patient is to take naproxen 500 b.i.d. for 1 week and start prednisone taper for 3 weeks.  patient is to follow-up with Rheumatology outpatient.  Patient is to follow-up at post wards clinic to follow-up with pending labs and blood culture.  Stable at the time of discharge.  Strict ED precautions given at the time of discharge.  Discharge physical exam:  Vitals:    11/23/22 1502   BP: 135/89   Pulse: 101   Resp: 20   Temp: 97.3 °F (36.3 °C)     General  : awake, alert, no acute distress.   Breath sounds clear to auscultation bilaterally  CVS: RRR, no murmurs.  Musculoskeletal:  Left wrist, right knee, bilateral foot and ankle swelling has improved  Neuro: CN II-XII GI, DTRs +2/4 throughout, sensory and motor innervation intact, no pathologic reflexes elicited.     TIME SPENT ON DISCHARGE: 60 minutes    Discharge Medications        Medication List        START taking these medications      naproxen 500 MG EC tablet  Commonly known as: EC NAPROSYN  Take 1 tablet (500 mg total) by mouth 2 (two) times daily. for 7 days     predniSONE 10 MG tablet  Commonly known as: DELTASONE  Take 3 tablets (30 mg total) by mouth once daily for 7 days, THEN 2 tablets (20 mg total) once daily for 7 days, THEN 1 tablet (10 mg total) once daily for 7 days.  Start taking on: November 23, 2022            CONTINUE taking these medications      pantoprazole 40 MG tablet  Commonly known as: PROTONIX  Take 1 tablet (40 mg total) by mouth once daily.               Where to Get Your Medications        These medications were sent to [facility] - [place], [place] - [address] [facility] [address]  [address] [facility] [address] [address]      Phone: [phone]   naproxen 500 MG EC tablet       These medications were sent to [facility] - [place] - [id number] [facility] [address]      Phone: [phone]    pantoprazole 40 MG tablet  predniSONE 10 MG tablet         Discharge Information:     Stable at discharge   Strict ED precautions given at the time of discharge   Follow-up at post wards within 2 weeks  Labs to follow up: BASIL, rheumatoid factor, HLA B27, EBV, parvovirus, blood cultures  Continue taking naproxen 500 b.i.d. for 1 week as scheduled and after that p.r.n.   Start taking PPI along with naproxen   Start prednisone taper 30 mg for 1 week, then 20 mg for 1 more week and then 10 mg daily for another week  Referral sent to rheumatology to follow-up outpatient    Devika García D.O  [employer] IM PGY 1

## 2022-11-25 LAB
AR ANA INTERPRETIVE COMMENT: NORMAL
AR ANTINUCLEAR ANTIBODY (ANA), HEP-2, IGG: NORMAL
B19V IGG SER QL IA: POSITIVE
B19V IGG+IGM SER-IMP: ABNORMAL
B19V IGM SER QL IA: NEGATIVE
BACTERIA STL CULT: NORMAL
C-ANCA TITR SER IF: NEGATIVE {TITER}
EBV NA AB SER QL: POSITIVE
EBV VCA IGG SER QL: POSITIVE
EBV VCA IGM SER QL: NEGATIVE
IMMUNOLOGIST REVIEW: NORMAL
P-ANCA SER QL IF: NEGATIVE
PATH REV: NORMAL
RF IGA SER-ACNC: <5 UNITS
RF IGG SER-ACNC: 5 UNITS
RF IGM SER-ACNC: <5 UNITS

## 2022-11-27 LAB
BACTERIA BLD CULT: NORMAL
MAYO GENERIC ORDERABLE RESULT: NORMAL

## 2022-11-28 LAB
CYCLIC CITRULLINATED PEPTIDE (CCP) (OLG): NEGATIVE
MAYO GENERIC ORDERABLE RESULT: NORMAL

## 2022-12-02 ENCOUNTER — TELEPHONE (OUTPATIENT)
Dept: RHEUMATOLOGY | Facility: CLINIC | Age: 42
End: 2022-12-02
Payer: MEDICARE

## 2022-12-02 NOTE — TELEPHONE ENCOUNTER
----- Message from Amy Lacy sent at 12/2/2022  8:53 AM CST -----  Patient contacted clinic today @ 0898    Patient is a patient of Dr Lopez and has an appt on 12/5/22.    Patient is currently unable to walk     Please Advise     [phone]    Thanks

## 2022-12-02 NOTE — TELEPHONE ENCOUNTER
Called to speak to pt, he asked me to talk w/his mom  She explained since DC from hospital sx have gotten worse  Increased swelling in feet, R knee, L hand and R shoulder  Can't get up or stand  No fever, rash or other new sx  Currently taking Prednisone 20mg daily  Out of Naproxen    Spoke to Dr. Lopez  She recommended pt take 40mg of Prednisone daily until his appt this Monday 12/5/2022  Also need to confirm he's taking pantoprazole    Called back and spoke to pt's mom  Told her to increase prednisone to 40mg/day and confirmed he's taking the pantoprazole  She asked about Naproxen-told her she can get more OTC  She asked about heat and/or ice for joints  Told her ice can be helpful-discussed protecting the skin and use intermittently   Explained that heat can be soothing but won't help w/joint inflammation so it's not recommended    She asked about taking him to ER-she's very concerned   Told her if sx progress or if he develops fever or rash he should go to ER    Confirmed appt Monday 12/5/2022 at 10:30 and appt in  12/5/2022 at 12:30

## 2022-12-03 ENCOUNTER — HOSPITAL ENCOUNTER (EMERGENCY)
Facility: HOSPITAL | Age: 42
Discharge: HOME | End: 2022-12-03
Attending: EMERGENCY MEDICINE
Payer: MEDICARE

## 2022-12-03 VITALS
RESPIRATION RATE: 19 BRPM | HEIGHT: 70 IN | BODY MASS INDEX: 26.2 KG/M2 | WEIGHT: 183 LBS | OXYGEN SATURATION: 98 % | TEMPERATURE: 99 F | SYSTOLIC BLOOD PRESSURE: 131 MMHG | HEART RATE: 90 BPM | DIASTOLIC BLOOD PRESSURE: 84 MMHG

## 2022-12-03 DIAGNOSIS — M13.0 POLYARTHROPATHY: Primary | ICD-10-CM

## 2022-12-03 LAB
ALBUMIN SERPL-MCNC: 2.7 GM/DL (ref 3.5–5)
ALBUMIN/GLOB SERPL: 0.5 RATIO (ref 1.1–2)
ALP SERPL-CCNC: 172 UNIT/L (ref 40–150)
ALT SERPL-CCNC: 55 UNIT/L (ref 0–55)
AST SERPL-CCNC: 19 UNIT/L (ref 5–34)
BASOPHILS # BLD AUTO: 0.02 X10(3)/MCL (ref 0–0.2)
BASOPHILS NFR BLD AUTO: 0.1 %
BILIRUBIN DIRECT+TOT PNL SERPL-MCNC: 0.3 MG/DL
BUN SERPL-MCNC: 22 MG/DL (ref 8.9–20.6)
CALCIUM SERPL-MCNC: 10.8 MG/DL (ref 8.4–10.2)
CHLORIDE SERPL-SCNC: 101 MMOL/L (ref 98–107)
CO2 SERPL-SCNC: 22 MMOL/L (ref 22–29)
CREAT SERPL-MCNC: 0.94 MG/DL (ref 0.73–1.18)
CRP SERPL-MCNC: 131.7 MG/L
EOSINOPHIL # BLD AUTO: 0 X10(3)/MCL (ref 0–0.9)
EOSINOPHIL NFR BLD AUTO: 0 %
ERYTHROCYTE [DISTWIDTH] IN BLOOD BY AUTOMATED COUNT: 13.2 % (ref 11.5–17)
ERYTHROCYTE [SEDIMENTATION RATE] IN BLOOD: 128 MM/HR (ref 0–15)
GFR SERPLBLD CREATININE-BSD FMLA CKD-EPI: >60 MLS/MIN/1.73/M2
GLOBULIN SER-MCNC: 5.3 GM/DL (ref 2.4–3.5)
GLUCOSE SERPL-MCNC: 140 MG/DL (ref 74–100)
HCT VFR BLD AUTO: 40.8 % (ref 42–52)
HGB BLD-MCNC: 13.4 GM/DL (ref 14–18)
IMM GRANULOCYTES # BLD AUTO: 0.15 X10(3)/MCL (ref 0–0.04)
IMM GRANULOCYTES NFR BLD AUTO: 0.9 %
LYMPHOCYTES # BLD AUTO: 0.92 X10(3)/MCL (ref 0.6–4.6)
LYMPHOCYTES NFR BLD AUTO: 5.3 %
MCH RBC QN AUTO: 26.9 PG (ref 27–31)
MCHC RBC AUTO-ENTMCNC: 32.8 MG/DL (ref 33–36)
MCV RBC AUTO: 81.8 FL (ref 80–94)
MONOCYTES # BLD AUTO: 0.6 X10(3)/MCL (ref 0.1–1.3)
MONOCYTES NFR BLD AUTO: 3.4 %
NEUTROPHILS # BLD AUTO: 15.8 X10(3)/MCL (ref 2.1–9.2)
NEUTROPHILS NFR BLD AUTO: 90.3 %
NRBC BLD AUTO-RTO: 0 %
PLATELET # BLD AUTO: 447 X10(3)/MCL (ref 130–400)
PMV BLD AUTO: 9.2 FL (ref 7.4–10.4)
POTASSIUM SERPL-SCNC: 4.6 MMOL/L (ref 3.5–5.1)
PROT SERPL-MCNC: 8 GM/DL (ref 6.4–8.3)
RBC # BLD AUTO: 4.99 X10(6)/MCL (ref 4.7–6.1)
SODIUM SERPL-SCNC: 133 MMOL/L (ref 136–145)
URATE SERPL-MCNC: 4.9 MG/DL (ref 3.5–7.2)
WBC # SPEC AUTO: 17.5 X10(3)/MCL (ref 4.5–11.5)

## 2022-12-03 PROCEDURE — 25000003 PHARM REV CODE 250: Performed by: EMERGENCY MEDICINE

## 2022-12-03 PROCEDURE — 84550 ASSAY OF BLOOD/URIC ACID: CPT | Performed by: EMERGENCY MEDICINE

## 2022-12-03 PROCEDURE — 80053 COMPREHEN METABOLIC PANEL: CPT | Performed by: EMERGENCY MEDICINE

## 2022-12-03 PROCEDURE — 99283 EMERGENCY DEPT VISIT LOW MDM: CPT | Mod: 25

## 2022-12-03 PROCEDURE — 86140 C-REACTIVE PROTEIN: CPT | Performed by: EMERGENCY MEDICINE

## 2022-12-03 PROCEDURE — 85025 COMPLETE CBC W/AUTO DIFF WBC: CPT | Performed by: EMERGENCY MEDICINE

## 2022-12-03 PROCEDURE — 85651 RBC SED RATE NONAUTOMATED: CPT | Performed by: EMERGENCY MEDICINE

## 2022-12-03 RX ORDER — NAPROXEN 500 MG/1
500 TABLET ORAL 2 TIMES DAILY PRN
Qty: 20 TABLET | Refills: 1 | Status: SHIPPED | OUTPATIENT
Start: 2022-12-03 | End: 2022-12-05 | Stop reason: SDUPTHER

## 2022-12-03 RX ORDER — NAPROXEN 250 MG/1
500 TABLET ORAL
Status: COMPLETED | OUTPATIENT
Start: 2022-12-03 | End: 2022-12-03

## 2022-12-03 RX ADMIN — NAPROXEN 500 MG: 250 TABLET ORAL at 02:12

## 2022-12-03 NOTE — CONSULTS
[facility] Medicine History and Physical     Attending Physician: Abhay Fletcher MD    Subjective:      Brief HPI:  Trey Harper is a 42 y.o. male who  has no past medical history on file.  He presented to [facility] on 12/3/2022  with a primary complaint of Joint Swelling (C/o continued joint swelling bilateral ankles, left hand, knees. Since released from hospital on 11/23. )  Patient presented to the hospital on the 21st of November for oligo arthritis, swelling tenderness in the left wrist, slightly tender effusion of the right knee.  Patient had a positive syphilis status post treated in September 2022.  Patient was seen by rheumatology in patient who recommended that workup and to be seen by outpatient.  Recommended that patient be put on naproxen 7 day and a prednisone taper.  Per talking to the patient and mother patient ran out of his naproxen approximately 4-5 days ago and that the pain has been significantly gotten worse.  CRP is 128 it was previously 30.  Patient's mother had contacted the office and rheumatology office had recommended patient take an increased dosage of prednisone to 40 daily.  However with increasing inability to walk and in ability to get up patient presented to the hospital today.      Current Outpatient Medications   Medication Instructions    pantoprazole (PROTONIX) 40 mg, Oral, Daily    predniSONE (DELTASONE) 10 MG tablet Take 3 tablets (30 mg total) by mouth once daily for 7 days, THEN 2 tablets (20 mg total) once daily for 7 days, THEN 1 tablet (10 mg total) once daily for 7 days.        Family History    None         Tobacco Use    Smoking status: Never    Smokeless tobacco: Never   Substance and Sexual Activity    Alcohol use: Never    Drug use: Never    Sexual activity: Not on file        Review of Systems:  Gen: No fevers, chills, night sweats, or change in vision  Heart: No chest pain, palpitations,  Lungs: No shortness of breath, cough, or wheezing  GI: No abdominal pain,  nausea, vomiting, constipation, or diarrhea  : No hematuria, No dysuria  Musk:  Decreased range of motion in both lower extremities  Integumentary: No rash or itching  Neuro: Normal speech, no focal weakness or headache     Objective:     Vital Signs:  Vital Signs (Most Recent):  Temp: 98.6 °F (37 °C) (12/03/22 1158)  Pulse: 106 (12/03/22 1158)  Resp: 20 (12/03/22 1158)  BP: 134/80 (12/03/22 1158)  SpO2: 100 % (12/03/22 1158) Vital Signs (24h Range):  Temp:  [98.6 °F (37 °C)] 98.6 °F (37 °C)  Pulse:  [106] 106  Resp:  [20] 20  SpO2:  [100 %] 100 %  BP: (134)/(80) 134/80   Body mass index is 26.26 kg/m².   No intake or output data in the 24 hours ending 12/03/22 1400    Physical Examination:  General:  Well developed, well nourished, no acute respiratory distress  Head: Normocephalic, atraumatic  Eyes: PERRL, EOMI, anicteric sclera  Throat: No posterior pharyngeal erythema or exudate, no tonsillar exudate  Neck: supple, normal ROM, no JVD  CVS:  RRR, S1 and S2 normal, no murmurs, no added heart sounds, rubs, gallops, regular peripheral pulses, and no peripheral edema  Resp:  Lungs clear to auscultation bilaterally, no wheezes, rales, or rhonci  GI:  Abdomen soft, non-tender, non-distended, normoactive bowel sounds  MSK:  Decreased range of motion in right lower extremity, deformity noticed on left 1st metatarsal.  Not red.  Is sensitive to the touch.  Patient is max range is proximally 30° on the right lower extremity.  Patient also has swelling a for full range of motion on left upper extremity.  Skin:  No rashes, ulcers, erythema  Neuro:  Alert and oriented x3, No focal neuro deficits, CNII-XII grossly intact  Psych:  Appropriate mood and affect     Laboratory:    Recent Labs   Lab 12/03/22  1315   WBC 17.5*   HGB 13.4*   HCT 40.8*   *   MCV 81.8   RDW 13.2     No results for input(s): TROPONINI, CKTOTAL, CKMB, BNP in the last 24 hours.  No results for input(s): TROPONINI, CKTOTAL, CKMB, BNP in the last  168 hours.  No results for input(s): CHOL, HDL, LDLCALC, TRIG, CHOLHDL in the last 168 hours. Recent Labs   Lab 12/03/22  1315   *   K 4.6   CHLORIDE 101   CO2 22   BUN 22.0*   CREATININE 0.94   CALCIUM 10.8*     Recent Labs   Lab 12/03/22  1315   ALBUMIN 2.7*   BILITOT 0.3   AST 19   ALKPHOS 172*   ALT 55     No results for input(s): IRON, TIBC, FERRITIN, SATURATEDIRO, [id number], FOLATE in the last 168 hours.  No results for input(s): TSH, J6KYPXT, HGBA1C, INR, PROTIME, PTT in the last 168 hours.       Microbiology Data:  Microbiology Results (last 7 days)       ** No results found for the last 168 hours. **             Other Results:    Radiology:  Imaging Results    None         Current Medications:     Infusions:        Scheduled:        PRN:       Antibiotics and Day Number of Therapy:  Antibiotics (From admission, onward)      None                 Assessment & Plan:   Seronegative spondyloarthropathy:  -patient had syphilis but was treated in September 2022 recent testing showed 0 Dill's.    -negative parvovirus, Anna Marie bar virus.  Parvovirus IgG however no IgM.  Negative rheumatoid factor IgM and IgA and IgG.  Positive HLA B27  -patient was also negative for gonorrhea and chlamydia.    -recommend increase doses of naproxen, patient was on 500 b.i.d. can take up to max of 1.5 g.  His symptomatology is likely secondary to not have enough NSAIDs.  Would additionally continue prednisone 40 as prescribed by rheumatologist at [facility].  Patient has a follow-up appointment on December 5th.  Can give a shot of ketorolac to help  -bedside ultrasound was done to try to see if there is any fluid however no palpable fluid seen on ultrasound.  If patient continues to have increased pain would recommend intra articular joint injection of NSAIDs.  -case was discussed with Dr. Baltazar hospitalist on-call and agree with recommendations to prescribing naproxen and continuing prednisone.  And to discharge patient for his  appointment on December 5th.    Jose Enrique Lazaro MD  Internal Medicine Resident PGY-II

## 2022-12-03 NOTE — DISCHARGE INSTRUCTIONS
Testing so far seems to indicate that your case of arthritis is most likely reactive to a recent infection or similar event.  Final diagnosis will be as per the Rheumatology and Internal Medicine Services; keep your appointments for both of these clinic as scheduled the day after tomorrow.      Continue current prednisone.      Resume anti-inflammatory medicine with naproxen as prescribed.      Continue current stomach acid reducing medicine.

## 2022-12-03 NOTE — ED PROVIDER NOTES
Encounter Date: 12/3/2022       History     Chief Complaint   Patient presents with    Joint Swelling     C/o continued joint swelling bilateral ankles, left hand, knees. Since released from hospital on 11/23.      He is here approximately 10 days after recent hospital discharge for an ongoing problem of polyarthritis.  He has excruciating pain in multiple joints and has had a recent admission for same with extensive workup.  He has not really gotten better or worse since hospitalization and treatment, he has follow-up in 2 days' time with internal medicine and rheumatology.  In the recent past he had other preceding episodes including bloody diarrhea resolved with antibiotic treatment and syphilis treated with appropriate antibiotics.  He has no prior personal history of significant arthritis and mother reports that he there is no significant family history of arthritis.  An attempt was made at arthrocentesis on the right knee but I do not believe fluid was obtained.  He has pain predominantly involving both ankles, the right knee, the left wrist, and to a lesser degree the right shoulder and right thumb.  He has not experienced fevers, chills, or sweats.  The joints involved do feel warm or hot but the overlying skin does not become red.  He has trouble moving around and tending to basic self-care due to ongoing pain.  He has been treated with prednisone and is awaiting further studies.  Initial CRP was significantly elevated, sed rate mildly elevated, and a review of previous data shows there is a positive HLA B27 marker, a positive parvovirus IgG, positive at the bar antibodies, normal serum electrolytes including uric acid, and no other obvious diagnostic study results.  He denies other systemic symptoms or other new complaints since hospital discharge.  Internal medicine consulted to review in assist while in the ER.  By phone, his prednisone dose was just increased to 40 mg a day, and he does have enough to  get him to his rheumatology and internal medicine appointment scheduled in 2 days.  He is also using some low-dose Norco p.r.n. for pain control.  Mother is helping manage his medications.  He has run out of naproxen.    The history is provided by the patient and a parent. No  was used.   Review of patient's allergies indicates:  No Known Allergies  History reviewed. No pertinent past medical history.  History reviewed. No pertinent surgical history.  History reviewed. No pertinent family history.  Social History     Tobacco Use    Smoking status: Never    Smokeless tobacco: Never   Substance Use Topics    Alcohol use: Never    Drug use: Never     Review of Systems   Constitutional:  Negative for chills and fever.   HENT:  Negative for congestion, facial swelling, nosebleeds and sinus pressure.    Eyes:  Negative for pain and redness.   Respiratory:  Negative for chest tightness, shortness of breath and wheezing.    Cardiovascular:  Negative for chest pain, palpitations and leg swelling.   Gastrointestinal:  Negative for abdominal distention, abdominal pain, diarrhea, nausea and vomiting.   Endocrine: Negative for cold intolerance, polydipsia and polyphagia.   Genitourinary:  Negative for difficulty urinating, dysuria, frequency and hematuria.   Musculoskeletal:  Positive for arthralgias. Negative for back pain, myalgias and neck pain.        See HPI   Skin:  Negative for color change and rash.   Neurological:  Negative for dizziness, weakness, numbness and headaches.   Hematological:  Negative for adenopathy. Does not bruise/bleed easily.   Psychiatric/Behavioral:  Negative for agitation and behavioral problems.    All other systems reviewed and are negative.    Physical Exam     Initial Vitals [12/03/22 1158]   BP Pulse Resp Temp SpO2   134/80 106 20 98.6 °F (37 °C) 100 %      MAP       --         Physical Exam    Nursing note and vitals reviewed.  Constitutional: He appears well-developed and  well-nourished. He is not diaphoretic. He appears distressed.   HENT:   Head: Normocephalic and atraumatic.   Mouth/Throat: Oropharynx is clear and moist. No oropharyngeal exudate.   Eyes: Conjunctivae and EOM are normal. Pupils are equal, round, and reactive to light. Right eye exhibits no discharge. Left eye exhibits no discharge. No scleral icterus.   Neck: Neck supple. No thyromegaly present. No tracheal deviation present. No JVD present.   Normal range of motion.  Cardiovascular:  Normal rate, regular rhythm and normal heart sounds.     Exam reveals no gallop and no friction rub.       No murmur heard.  Pulmonary/Chest: Breath sounds normal. No respiratory distress. He has no wheezes. He has no rhonchi. He has no rales. He exhibits no tenderness.   Abdominal: Abdomen is soft. Bowel sounds are normal. He exhibits no distension and no mass. There is no abdominal tenderness. There is no rebound and no guarding.   Musculoskeletal:         General: Tenderness present. No edema.      Cervical back: Normal range of motion and neck supple.      Comments: Significant polyarticular swelling and pain with similar findings in the involved joints.  Joints involved are both ankles, right knee, left wrist, and to a lesser degree right shoulder and right thumb.  The joint in question are slightly warm without erythema, mildly diffusely thickened, very painful to touch and more so to active or passive range of motion.  No definite joint effusions detected.  Uninvolved joints appear grossly normal.     Lymphadenopathy:     He has no cervical adenopathy.   Neurological: He is alert and oriented to person, place, and time. He has normal strength. No cranial nerve deficit.   Skin: Skin is warm and dry. No rash noted. No erythema.   Psychiatric: He has a normal mood and affect. His behavior is normal. Judgment and thought content normal.       ED Course   Procedures  Labs Reviewed   COMPREHENSIVE METABOLIC PANEL - Abnormal; Notable  for the following components:       Result Value    Sodium Level 133 (*)     Glucose Level 140 (*)     Blood Urea Nitrogen 22.0 (*)     Calcium Level Total 10.8 (*)     Albumin Level 2.7 (*)     Globulin 5.3 (*)     Albumin/Globulin Ratio 0.5 (*)     Alkaline Phosphatase 172 (*)     All other components within normal limits   SEDIMENTATION RATE, AUTOMATED - Abnormal; Notable for the following components:    Sed Rate 128 (*)     All other components within normal limits   C-REACTIVE PROTEIN - Abnormal; Notable for the following components:    C-Reactive Protein 131.70 (*)     All other components within normal limits   CBC WITH DIFFERENTIAL - Abnormal; Notable for the following components:    WBC 17.5 (*)     Hgb 13.4 (*)     Hct 40.8 (*)     MCH 26.9 (*)     MCHC 32.8 (*)     Platelet 447 (*)     Neut # 15.8 (*)     IG# 0.15 (*)     All other components within normal limits   URIC ACID - Normal   CBC W/ AUTO DIFFERENTIAL    Narrative:     The following orders were created for panel order CBC auto differential.  Procedure                               Abnormality         Status                     ---------                               -----------         ------                     CBC with Differential[[id number]]        Abnormal            Final result                 Please view results for these tests on the individual orders.   EXTRA TUBES    Narrative:     The following orders were created for panel order EXTRA TUBES.  Procedure                               Abnormality         Status                     ---------                               -----------         ------                     Light Blue Top Hold[[id number]]                              In process                 Red Top Hold[[id number]]                                                                  Please view results for these tests on the individual orders.   LIGHT BLUE TOP HOLD   RED TOP HOLD          Imaging Results    None           Medications   naproxen tablet 500 mg (500 mg Oral Incomplete 12/3/22 1440)                           2:40 PM Clinically stable.  Appreciate Internal Medicine assistance.  Stable for continued outpatient management on current medications, resume naproxen.  Keep appointment as scheduled in 2 days.         Clinical Impression:   Final diagnoses:  [M13.0] Polyarthropathy (Primary)      ED Disposition Condition    Discharge Stable          ED Prescriptions       Medication Sig Dispense Start Date End Date Auth. Provider    naproxen (NAPROSYN) 500 MG tablet Take 1 tablet (500 mg total) by mouth 2 (two) times daily as needed (For pain/ inflammation; may take 3rd daily dose if needed for a maximum 1500 mg in 24 hours). 20 tablet 12/3/2022 12/13/2022 Abhay Fletcher MD          Follow-up Information       Follow up With Specialties Details Why Contact Info    [facility] Dept Emergency Medicine  As needed [address] [facility] [id number] [phone]             Abhay Fletcher MD  12/03/22 3765

## 2022-12-05 ENCOUNTER — OFFICE VISIT (OUTPATIENT)
Dept: RHEUMATOLOGY | Facility: CLINIC | Age: 42
End: 2022-12-05
Payer: MEDICARE

## 2022-12-05 ENCOUNTER — OFFICE VISIT (OUTPATIENT)
Dept: INTERNAL MEDICINE | Facility: CLINIC | Age: 42
End: 2022-12-05
Payer: MEDICARE

## 2022-12-05 VITALS
HEART RATE: 83 BPM | TEMPERATURE: 99 F | BODY MASS INDEX: 26.46 KG/M2 | SYSTOLIC BLOOD PRESSURE: 126 MMHG | HEIGHT: 69 IN | DIASTOLIC BLOOD PRESSURE: 79 MMHG | OXYGEN SATURATION: 98 % | RESPIRATION RATE: 18 BRPM | WEIGHT: 178.63 LBS

## 2022-12-05 VITALS
BODY MASS INDEX: 25.83 KG/M2 | DIASTOLIC BLOOD PRESSURE: 74 MMHG | SYSTOLIC BLOOD PRESSURE: 118 MMHG | HEART RATE: 80 BPM | RESPIRATION RATE: 18 BRPM | HEIGHT: 69 IN | WEIGHT: 174.38 LBS | TEMPERATURE: 98 F

## 2022-12-05 DIAGNOSIS — M25.561 CHRONIC PAIN OF RIGHT KNEE: ICD-10-CM

## 2022-12-05 DIAGNOSIS — M12.80 HLA-B27 POSITIVE ARTHROPATHY: Primary | ICD-10-CM

## 2022-12-05 DIAGNOSIS — M25.50 POLYARTHRALGIA: ICD-10-CM

## 2022-12-05 DIAGNOSIS — Z86.19 HISTORY OF SYPHILIS: ICD-10-CM

## 2022-12-05 DIAGNOSIS — Z79.899 HIGH RISK MEDICATION USE: ICD-10-CM

## 2022-12-05 DIAGNOSIS — K21.9 GASTROESOPHAGEAL REFLUX DISEASE, UNSPECIFIED WHETHER ESOPHAGITIS PRESENT: ICD-10-CM

## 2022-12-05 DIAGNOSIS — G89.29 CHRONIC PAIN OF RIGHT KNEE: ICD-10-CM

## 2022-12-05 DIAGNOSIS — Z15.89 HLA B27 POSITIVE: Primary | ICD-10-CM

## 2022-12-05 DIAGNOSIS — M06.09 RHEUMATOID ARTHRITIS, SERONEGATIVE, MULTIPLE SITES: ICD-10-CM

## 2022-12-05 DIAGNOSIS — M06.00 SERONEGATIVE RHEUMATOID ARTHRITIS: ICD-10-CM

## 2022-12-05 DIAGNOSIS — A53.0 POSITIVE SEROLOGY FOR SYPHILIS: ICD-10-CM

## 2022-12-05 PROCEDURE — 3078F PR MOST RECENT DIASTOLIC BLOOD PRESSURE < 80 MM HG: ICD-10-PCS | Mod: CPTII,,, | Performed by: INTERNAL MEDICINE

## 2022-12-05 PROCEDURE — 3078F DIAST BP <80 MM HG: CPT | Mod: CPTII,,, | Performed by: INTERNAL MEDICINE

## 2022-12-05 PROCEDURE — 3074F PR MOST RECENT SYSTOLIC BLOOD PRESSURE < 130 MM HG: ICD-10-PCS | Mod: CPTII,,, | Performed by: INTERNAL MEDICINE

## 2022-12-05 PROCEDURE — 99215 PR OFFICE/OUTPT VISIT, EST, LEVL V, 40-54 MIN: ICD-10-PCS | Mod: S$PBB,,, | Performed by: INTERNAL MEDICINE

## 2022-12-05 PROCEDURE — 3074F SYST BP LT 130 MM HG: CPT | Mod: CPTII,,, | Performed by: INTERNAL MEDICINE

## 2022-12-05 PROCEDURE — 99215 OFFICE O/P EST HI 40 MIN: CPT | Mod: S$PBB,,, | Performed by: INTERNAL MEDICINE

## 2022-12-05 PROCEDURE — 3008F BODY MASS INDEX DOCD: CPT | Mod: CPTII,,, | Performed by: INTERNAL MEDICINE

## 2022-12-05 PROCEDURE — 99214 OFFICE O/P EST MOD 30 MIN: CPT | Mod: 25,PBBFAC

## 2022-12-05 PROCEDURE — 3008F PR BODY MASS INDEX (BMI) DOCUMENTED: ICD-10-PCS | Mod: CPTII,,, | Performed by: INTERNAL MEDICINE

## 2022-12-05 PROCEDURE — 99214 OFFICE O/P EST MOD 30 MIN: CPT | Mod: PBBFAC,27 | Performed by: INTERNAL MEDICINE

## 2022-12-05 RX ORDER — EMTRICITABINE AND TENOFOVIR DISOPROXIL FUMARATE 200; 300 MG/1; MG/1
1 TABLET, FILM COATED ORAL
COMMUNITY
Start: 2022-11-14 | End: 2023-01-17 | Stop reason: ALTCHOICE

## 2022-12-05 RX ORDER — PREDNISONE 10 MG/1
TABLET ORAL
Qty: 120 TABLET | Refills: 0 | Status: SHIPPED | OUTPATIENT
Start: 2022-12-05 | End: 2023-01-17 | Stop reason: ALTCHOICE

## 2022-12-05 RX ORDER — METHOTREXATE 2.5 MG/1
TABLET ORAL
Qty: 30 TABLET | Refills: 0 | Status: SHIPPED | OUTPATIENT
Start: 2022-12-05 | End: 2023-01-11 | Stop reason: SDUPTHER

## 2022-12-05 RX ORDER — NAPROXEN 500 MG/1
500 TABLET ORAL 2 TIMES DAILY PRN
Qty: 30 TABLET | Refills: 2 | Status: SHIPPED | OUTPATIENT
Start: 2022-12-05 | End: 2022-12-15

## 2022-12-05 RX ORDER — DOXYLAMINE SUCCINATE AND PHENYLEPHRINE HYDROCHLORIDE 7.5; 1 MG/1; MG/1
1 TABLET ORAL 2 TIMES DAILY
COMMUNITY
Start: 2022-07-05 | End: 2022-12-05 | Stop reason: ALTCHOICE

## 2022-12-05 RX ORDER — ADALIMUMAB 40MG/0.8ML
40 KIT SUBCUTANEOUS
Qty: 2 EACH | Refills: 2 | Status: SHIPPED | OUTPATIENT
Start: 2022-12-05 | End: 2023-01-17 | Stop reason: ALTCHOICE

## 2022-12-05 RX ORDER — ADALIMUMAB 40MG/0.4ML
40 KIT SUBCUTANEOUS
Qty: 2 PEN | Refills: 2 | Status: SHIPPED | OUTPATIENT
Start: 2022-12-05 | End: 2023-01-17 | Stop reason: SDUPTHER

## 2022-12-05 RX ORDER — AMOXICILLIN 875 MG/1
875 TABLET, FILM COATED ORAL 2 TIMES DAILY
COMMUNITY
Start: 2022-07-05 | End: 2022-12-05 | Stop reason: ALTCHOICE

## 2022-12-05 RX ORDER — PANTOPRAZOLE SODIUM 40 MG/1
40 TABLET, DELAYED RELEASE ORAL DAILY
Qty: 30 TABLET | Refills: 3 | Status: SHIPPED | OUTPATIENT
Start: 2022-12-05 | End: 2023-01-17 | Stop reason: ALTCHOICE

## 2022-12-05 RX ORDER — NAPROXEN 500 MG/1
500 TABLET ORAL 2 TIMES DAILY PRN
Qty: 30 TABLET | Refills: 2 | Status: SHIPPED | OUTPATIENT
Start: 2022-12-05 | End: 2022-12-05 | Stop reason: SDUPTHER

## 2022-12-05 RX ORDER — HYDROCODONE BITARTRATE AND ACETAMINOPHEN 5; 325 MG/1; MG/1
1 TABLET ORAL EVERY 12 HOURS PRN
COMMUNITY
End: 2023-01-17 | Stop reason: ALTCHOICE

## 2022-12-05 RX ORDER — FOLIC ACID 1 MG/1
1 TABLET ORAL DAILY
Qty: 30 TABLET | Refills: 6 | Status: SHIPPED | OUTPATIENT
Start: 2022-12-05 | End: 2023-01-17 | Stop reason: SDUPTHER

## 2022-12-05 RX ORDER — CHLORPROMAZINE HYDROCHLORIDE 25 MG/1
25 TABLET, FILM COATED ORAL 3 TIMES DAILY
COMMUNITY
Start: 2022-09-22 | End: 2022-12-05

## 2022-12-05 NOTE — PROGRESS NOTES
Patient ID: [id number]     Chief Complaint: New patient, hospital f/u        HPI:     Trey Harper is a 42 y.o. male here today for inflammatory arthritis.      He presented to ED initially on 11/22/2022 complaining of pain and swelling in left wrist, left hand, bilateral ankles and right knee.   Started with pain and swelling in left wrist and progress to bilateral ankles and right knee.  Denies fevers or recent travel.  History of bloody diarrhea in second week of 11/2022 and he went to ER for that.  He was treated with ciprofloxacin and Flagyl at the time.  Diarrhea has resolved.  Denies history of rashes, oral or nasal or genital ulcers.  This is the first episode he had swelling and pain in multiple joints, never had similar issues in the past.  History of septic arthritis in right knee and he was 5 to 6 years old.     Per patient he tested positive for syphilis recently and he was treated for that with penicillin in 9/2022.     He was discharged 11/23/2022 and presented back to the ED on 12/3/2022 for continued complains of joint pain. Today he presents to clinic for follow up and is having some right shoulder pain from using that side as well as some right jaw pain after chewing at times. He has continued to take Prednisone 40 mg daily after calling Friday- tried to decrease dose to 20 mg however pain was increasing- advised to increase back to 40 mg daily until today's apt. At his visit to ED he was sent home with Naproxen to take as directed with Prednisone.     Recent lab showed + HLA-B27.   He is MSM and Truvada was prescribed by his PCP for prophylaxis but he has not taken it, afraid of side effects. HIV testing negative in 12/2022.     PMH: GERD and chronic right knee pain.     Denies fevers, rashes, oral and nasal ulcers, history of DVT or PE, history of stroke or seizures, history of MI, history of malignancies, Raynaud's phenomenon, history of inflammatory eye diseases, history of inflammatory  bowel disease.     Family history of autoimmune disease: denies.     Smoking:   Social History     Tobacco Use   Smoking Status Never   Smokeless Tobacco Never          ----------------------------  Other specified noninfective gastroenteritis and colitis  Syphilis, unspecified     Past Surgical History:   Procedure Laterality Date    SPINE SURGERY         Review of patient's allergies indicates:  No Known Allergies    Outpatient Medications Marked as Taking for the 12/5/22 encounter (Office Visit) with Kojo Lopez MD   Medication Sig Dispense Refill    HYDROcodone-acetaminophen (NORCO) 5-325 mg per tablet Take 1 tablet by mouth every 12 (twelve) hours as needed for Pain.      [DISCONTINUED] naproxen (NAPROSYN) 500 MG tablet Take 1 tablet (500 mg total) by mouth 2 (two) times daily as needed (For pain/ inflammation; may take 3rd daily dose if needed for a maximum 1500 mg in 24 hours). 20 tablet 1    [DISCONTINUED] pantoprazole (PROTONIX) 40 MG tablet Take 1 tablet (40 mg total) by mouth once daily. 30 tablet 3    [DISCONTINUED] predniSONE (DELTASONE) 10 MG tablet Take 3 tablets (30 mg total) by mouth once daily for 7 days, THEN 2 tablets (20 mg total) once daily for 7 days, THEN 1 tablet (10 mg total) once daily for 7 days. 42 tablet 0       Social History     Socioeconomic History    Marital status: Single   Tobacco Use    Smoking status: Never    Smokeless tobacco: Never   Substance and Sexual Activity    Alcohol use: Never    Drug use: Never    Sexual activity: Not Currently     Partners: Male        Family History   Problem Relation Age of Onset    Cancer Father         Immunization History   Administered Date(s) Administered    DT (Pediatric) 12/17/1984, 09/30/1994    DTP 1980, 1980, 1980, 10/22/1981    MMR 06/25/1981    OPV 1980, 1980, 1980, 10/22/1981       Patient Care Team:  Prisca Contreras MD as PCP - General (Internal Medicine)     Subjective:  "    ROS    Constitutional:  Denies chills. Denies fever. Denies night sweats. Denies weight loss.   Ophthalmology: Denies blurred vision. Denies dry eyes. Denies eye pain. Denies Itching and redness.   ENT: Denies oral ulcers. Denies epistaxis. Denies dry mouth. Denies swollen glands.   Endocrine: Denies diabetes. Denies thyroid Problems.   Respiratory: Denies cough. Denies shortness of breath. Denies shortness of breath with exertion. Denies hemoptysis.   Cardiovascular: Denies chest pain at rest. Denies chest pain with exertion. Denies palpitations.    Gastrointestinal: Denies abdominal pain. Denies diarrhea. Denies nausea. Denies vomiting. Denies hematemesis or hematochezia. Denies heartburn.  Genitourinary: Denies blood in urine.  Musculoskeletal: See HPI for details  Integumentary: Denies rash. Denies photosensitivity.   Peripheral Vascular: Denies Ulcers of hands and/or feet. Denies Cold extremities.   Neurologic: Denies dizziness. Denies headache.  Denies loss of strength. Denies numbness or tingling.   Psychiatric: Denies depression. Denies anxiety. Denies suicidal/homicidal ideations.      Objective:     /79 (BP Location: Right arm, Patient Position: Sitting, BP Method: Medium (Automatic))   Pulse 83   Temp 98.6 °F (37 °C) (Oral)   Resp 18   Ht 5' 9" (1.753 m)   Wt 81 kg (178 lb 9.6 oz)   SpO2 98%   BMI 26.37 kg/m²     Physical Exam    General Appearance: alert, pleasant, in no acute distress.  Skin: Skin color, texture, turgor normal. No rashes or lesions.  Eyes:  extraocular movement intact (EOMI), pupils equal, round, reactive to light and accommodation, conjunctiva clear.  ENT: No oral or nasal ulcers.  Neck:  Neck supple. No adenopathy.   Lungs: CTA throughout without crackles, rhonchi, or wheezes.   Heart: RRR w/o murmurs.  No edema.   Abdomen: Soft, non-tender, no masses, rebound or guarding.  Neuro: Alert, oriented, CN II-XII GI, sensory and motor innervation intact.  Musculoskeletal: " Swelling and tenderness in left wrist with palpation.  Swelling of dorsum of left hand.  No tenderness in MCPs or PIP knees bilaterally.  Swelling, tenderness and large effusion of right knee.  Trace effusion of left knee.  Swelling, tenderness and warmth of bilateral ankles, tenderness with range of motion of bilateral ankles.   Swelling and tenderness in joints better than before.  Psych: Alert, oriented, normal eye contact.    Labs:     Lab Results   Component Value Date    WBC 17.5 (H) 2022    HGB 13.4 (L) 2022    HCT 40.8 (L) 2022     (H) 2022    ALT 55 2022    AST 19 2022    BUN 22.0 (H) 2022    CREATININE 0.94 2022     (L) 2022    K 4.6 2022    CO2 22 2022    .70 (H) 2022    SEDRATE 128 (H) 2022: Hep B and C negative. HIV negative. Uric acid normal. ANCA negative. HLAB 27 positive. RPR and syphilis ab positive.  Chlamydia and gonorrhea PCR negative.  BASIL negative.  Rheumatoid factor and anti CCP negative.  EBV IgM negative, IgG positive.  Parvovirus B19 not detected.  EBV DNA not detected. Blood culture negative.   2022:  WBC count 17.7.  Hemoglobin 13.4.  Platelet count elevated 447.  ESR elevated 128, normal less than 15.  Calcium slightly elevated 10.8.  Alk phos 172.  CMP okay.  ALT upper limit of normal.  CRP elevated 131.    Imagin22:  X-ray of left wrist showed possible small avulsion injury at the distal tip of the ulnar styloid process, no erosions.  X-ray of left ankle showed soft tissue swelling on lateral side.  Normal x-ray of right ankle.  Normal x-ray of right knee.    2022:  Echocardiogram showed EF 64%, normal ejection fraction.  Normal right ventricular function.  Normal left ventricular systolic and diastolic function.    Assessment:       ICD-10-CM ICD-9-CM   1. HLA-B27 positive arthropathy  M12.80 716.80   2. Rheumatoid arthritis, seronegative, multiple  sites  M06.09 714.0   3. High risk medication use  Z79.899 V58.69   4. Chronic pain of right knee  M25.561 719.46    G89.29 338.29   5. Positive serology for syphilis  A53.0 097.1        Plan:     1. 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    - Negative RF and anti CCP. BASIL negative. HLA B27 Positive. Work up negative for EBV, Parvovirus, Chlamydia and Gonorrhea and blood cultures negative. No rashes, ECHO normal with no vegetations. Hep panel, HIV negative.   -Will start MTX and Humira for inflammatory arthritis.   -Start MTX 15 mg po qweek and folic acid 1 mg daily.   - Start Humira 40 mg subq every other week.   - Continue Prednisone 40 mg po qd, decrease dose by 10 mg daily as tolerated.   - Continue Naproxen 500 mg po bid with food with PPI daily as directed.      2. Rheumatoid arthritis, seronegative, multiple sites   -See above     3. High risk medication use   -Advised to stay up-to-date on age appropriate vaccinations and malignancy screening with PCP.   -Persons with rheumatoid arthritis, lupus, psoriatic arthritis and other autoimmune diseases are at increased risk of cardiovascular disease including heart attack and stroke. We recommend that all patients with these conditions have annual health maintenance exams including lipid measurements, blood pressure measurements, and smoking cessation counseling when applicable at their primary care provider's office.      4. Chronic pain of right knee   -Secondary to infection he had as a kid and secondary arthritis.      5.   History of Syphilis: Treated with PCN in September 2022 at [facility].     Discussed course and treatment options of spondyloarthropathy with the patient.    - Discussed risks and benefits of Methotrexate (MTX) in detail. MTX is an immunosuppressant medication.  When used in high doses (such as in  cancer), it may have many side effects.  The doses that are used in rheumatological disorders are much lower.  Side effects were explained to the patient, including kidney and liver damage, bone marrow suppression, increased infection risk, mouth sores, hair loss, nausea, GI symptoms.   Start Methotrexate at 15mg per week.  Start folic 1mg daily to prevent some of the side effects of methotrexate.  Labwork: CBC and a CMP should be checked at baseline, monthly for the first few months and every 3 months afterwards.  Check a Hepatitis Panel and a Chest X-Ray prior to initiation of methotrexate.  Patient to call with any concerns and adverse effects of MTX.    Methotrexate is also teratogenic, so birth control is needed while on this medication if applicable.  Counselled on limiting alcohol use to 1-2 drinks/week while on methotrexate given potential liver toxicity.    -Discussed the use of anti-TNF agents in inflammatory autoimmune disease.  TNF-alpha promotes the inflammatory response, which in turn causes many of the clinical problems associated with autoimmune disorders such as rheumatoid arthritis, ankylosing spondylitis, Crohn's disease, psoriasis, hidradenitis suppurativa and refractory asthma.  Inhibition of TNF-alpha can decrease inflammation and in turn stop or decrease joint damage.    -This inhibition of TNF-alpha can be achieved with a monoclonal antibody such as infliximab (Remicade), adalimumab (Humira), certolizumab pegol (Cimzia), and golimumab (Simponi), or with a circulating receptor fusion protein such as etanercept (Enbrel).  These agents can be used as mono-therapy or in combination with other immunosuppressants.  -Side effects include infection, especially opportunistic infections such as tuberculosis, fungal infections and certain types of bacterial infections.      Follow up in about 4 weeks (around 1/2/2023) for MD Follow Up. In addition to their scheduled follow up, the patient has also been  instructed to follow up on as needed basis.        Total time spent with patient and documentation is more than 60 minutes. All questions were answered to patient's satisfaction and patient verbalized understanding.

## 2022-12-05 NOTE — PROGRESS NOTES
I have reviewed and concur with the resident's history, physical, assessment, and plan.  I have discussed with him all issues related to the diagnosis, workup and treatment plan.Care provided as reasonable and necessary.HLA b 27 arthropathy on rxment    Navjot Davila MD  [facility]

## 2022-12-05 NOTE — PROGRESS NOTES
"[facility] Internal Post Wards Visit    Chief Complaint:      Post wards follow-up    Subjective:     HPI:  Trey Harper is a 42 y.o. 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 He was discharged in Home or Self Care on 12/3/22.  He presents to clinic today for post wards follow-up.  He initially presented to ED complaining of pain and swelling in left wrist, left hand, bilateral ankles and right knee. He had a history of bloody diarrhea which was treated with cipro and flagyl at an outside facility and had resolved prior to presentation in the ED. He was admitted for polyarticular joint pain with difficulty ambulating and had elevated inflammatory markers with leukocytosis and was started on antibiotic coverage for possible synovitis. Rheumatology was consulted; recommended starting naproxen and prednisone, discharged with course of NSAIDs and prednisone taper. He returned to ED on 12/3/22 after which he was discharged with increased dosage of NSAIDs and instructed to follow up with Rheumatology. Today, he presents complaining of persistent pain most prominently at jaw and shoulder. He was seen in Rheumatology clinic today and started on humira and methotrexate with folic acid. He will follow up with Rheumatology 1/2023. Explained to patient I have no further treatment to offer and will defer to Rheumatology, and his course is pending response to treatment.   He has a PCP in [place] but would like a referral to transfer his primary care needs to [facility]    Review of Systems  A comprehensive 12 point review of systems was completed.  Please see above for pertinent positives and negatives.     Objective:   Last 24 Hour Vital Signs:  Vitals  BP: 118/74  Temp: 98.1 °F (36.7 °C)  Temp src: Oral  Pulse: 80  Resp: 18  Height: 5' 9" (175.3 cm)  Weight: 79.1 kg (174 lb 6.4 oz)    Physical Examination:  Physical " Examination:  Vitals:   Vitals:    12/05/22 1235   BP: 118/74   Pulse: 80   Resp: 18   Temp: 98.1 °F (36.7 °C)       General: Awake, alert, & oriented to person, place & time. No acute distress  Psychiatric: Mood and affect normal  HEENT: Normocephalic, atraumatic. Face symmetric. Mucous membranes moist.   Cardiovascular: Regular rate & rhythm. Normal S1 & S2 w/out murmurs, rubs or gallops.  Pulmonary: Bilateral symmetric chest rise. Non-labored, CTAB  Abdominal:  Soft, nontender, nondistended. Bowel sounds present  Extremities: (+) L wrist swelling and dorsal surface with tenderness to palpation; (+) swelling of right knee, b/l ankles. Full ROM in all extremities  Skin:  Warm & dry.  Neuro:   Strength 5/5, DTR 2+ & tone normal throughout. Sensation intact bilateraly.      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  Seronegative rheumatoid arthritis   -Seen in Rheumatology clinic today and started on methotrexate and Humira with folic acid  -Continue prednisone taper and naproxen 500 BID with daily PPI per Rheumatology  Will defer management to Rheumatology, patient advised that I do not have further intervention to offer regarding his symptoms as his course on new medications is still currently pending     GERD  -Continue PPI as above    History of syphilis infection  S/p pcn treatment for late latent infection 9/2022 at local health unit      Follow-ups  -Follow-up with PCP in [place] as scheduled  -patient states he eventually wants to transfer PCP care to our facility since he will be followed by [facility]. Will place ambulatory referral for establishment with PCP       Stephanie Sawyer MD  Internal Medicine, PGY-2

## 2022-12-08 ENCOUNTER — TELEPHONE (OUTPATIENT)
Dept: RHEUMATOLOGY | Facility: CLINIC | Age: 42
End: 2022-12-08
Payer: MEDICARE

## 2022-12-08 NOTE — TELEPHONE ENCOUNTER
Notified by [id number] that TB test results needed for Humira PA  That lab hasn't been done  Lab ordered and called pt to see when/where he could have done  Pt stated he'll go to [facility] and have drawn tomorrow  Printed lab order and faxed to [facility], scanned to chart w/confirmation

## 2022-12-09 NOTE — TELEPHONE ENCOUNTER
Called [facility] [phone] and verified TB lab order has been received

## 2022-12-29 ENCOUNTER — TELEPHONE (OUTPATIENT)
Dept: RHEUMATOLOGY | Facility: CLINIC | Age: 42
End: 2022-12-29
Payer: MEDICARE

## 2022-12-29 NOTE — TELEPHONE ENCOUNTER
Pt called and LVM asking for call back, he has a question  Called pt to f/u  He asked how he can pay his bill since he doesn't have a credit card so can't pay online  I did a search and gave him phone # I found online for [facility] billing; [phone]  Pt will call them and will call me back if he needs additional assistance

## 2023-01-11 DIAGNOSIS — Z79.899 HIGH RISK MEDICATION USE: ICD-10-CM

## 2023-01-11 DIAGNOSIS — M12.80 HLA-B27 POSITIVE ARTHROPATHY: ICD-10-CM

## 2023-01-11 DIAGNOSIS — M06.09 RHEUMATOID ARTHRITIS, SERONEGATIVE, MULTIPLE SITES: Primary | ICD-10-CM

## 2023-01-11 RX ORDER — METHOTREXATE 2.5 MG/1
TABLET ORAL
Qty: 30 TABLET | Refills: 0 | Status: SHIPPED | OUTPATIENT
Start: 2023-01-11 | End: 2023-01-17 | Stop reason: SDUPTHER

## 2023-01-13 NOTE — PROGRESS NOTES
"    Patient ID: [id number]     Chief Complaint: Follow-up (Has been feeling a lot better since out of hospital/Joints still hurt but has been better/Found that TRUVADA might be the cause of the n&v, bloody stool, joint ache. /Took TRUVADA one month only, during September)      HPI:     Trey Harper is a 43 y.o. male here today for follow up of spondyloarthritis, +HLA-B27.      Today he presents to clinic for follow up after starting Humira and MTX. He is currently taking 6 tabs of MTX every Friday and tolerating well with folic acid. He was started on Humira at his last visit, taking every other week and tolerating well without side effects. Upon presentation he appears much improved physically since last visit and admits to feeling much better. He does still have some joint pain to his wrist, left ankle with some "popping" and his right knee, most likely right knee pain from previous history of septic arthritis as a child. He is not having any red/warm/swollen joints at this time. Morning stiffness <5 minutes. Overall he had improvement with current dosing of medication. He has also been discharged from home PT within the last 2-3 weeks as he has had significant improvement.   He has been off of Prednisone for the past week, was having significant hunger on medication so stopped.      PMH: GERD and chronic right knee pain.  Denies fevers, rashes, oral and nasal ulcers, history of DVT or PE, history of stroke or seizures, history of MI, history of malignancies, Raynaud's phenomenon, history of inflammatory eye diseases, history of inflammatory bowel disease.  Family history of autoimmune disease: denies.   Smoking: none    History: Presented to ED initially on 11/22/2022 complaining of pain and swelling in left wrist, left hand, bilateral ankles and right knee. Started with pain and swelling in left wrist and progress to bilateral ankles and right knee.  Denies fevers or recent travel.  History of bloody " diarrhea in second week of 11/2022 in which he presented to ER for eval.  He was treated with ciprofloxacin and Flagyl at the time.  Diarrhea has resolved.  Denies history of rashes, oral or nasal or genital ulcers. This is the first episode he had swelling and pain in multiple joints, no similar issues in the past.  History of septic arthritis in right knee when he was 5 to 6 years old. 11/22/2022 HLA-B27 + during hospital stay.   He previously tested positive for syphilis recently and he was treated with penicillin in 9/2022.     Social History     Tobacco Use   Smoking Status Never   Smokeless Tobacco Never          ----------------------------  Other specified noninfective gastroenteritis and colitis  Syphilis, unspecified     Past Surgical History:   Procedure Laterality Date    SPINE SURGERY         Review of patient's allergies indicates:  No Known Allergies    Outpatient Medications Marked as Taking for the 1/17/23 encounter (Office Visit) with Kojo Lopez MD   Medication Sig Dispense Refill    ALCOHOL PREP PADS PadM SMARTSIG:Pledget(s) Topical      naproxen (NAPROSYN) 500 MG tablet Take by mouth.      SHARPS CONTAINER use as directed      [DISCONTINUED] adalimumab (HUMIRA,CF, PEN) 40 mg/0.4 mL PnKt Inject 0.4 mLs (40 mg total) into the skin every 14 (fourteen) days. 2 pen 2    [DISCONTINUED] folic acid (FOLVITE) 1 MG tablet Take 1 tablet (1 mg total) by mouth once daily. 30 tablet 6    [DISCONTINUED] methotrexate 2.5 MG Tab Take 6 tablets once a week. Hold for infection or fever. 30 tablet 0    [DISCONTINUED] montelukast (SINGULAIR) 10 mg tablet Take 10 mg by mouth.      [DISCONTINUED] pantoprazole (PROTONIX) 40 MG tablet Take 1 tablet (40 mg total) by mouth once daily. 30 tablet 3       Social History     Socioeconomic History    Marital status: Single   Tobacco Use    Smoking status: Never    Smokeless tobacco: Never   Substance and Sexual Activity    Alcohol use: Never    Drug use: Never    Sexual  "activity: Not Currently     Partners: Male        Family History   Problem Relation Age of Onset    Cancer Father         Immunization History   Administered Date(s) Administered    DT (Pediatric) 12/17/1984, 09/30/1994    DTP 1980, 1980, 1980, 10/22/1981    MMR 06/25/1981    OPV 1980, 1980, 1980, 10/22/1981       Patient Care Team:  Prisca Contreras MD as PCP - General (Internal Medicine)     Subjective:     ROS    Constitutional:  Denies chills. Denies fever. Denies night sweats. Denies weight loss.   Ophthalmology: Denies blurred vision. Denies dry eyes. Denies eye pain. Denies Itching and redness.   ENT: Denies oral ulcers. Denies epistaxis. Denies dry mouth. Denies swollen glands.   Endocrine: Denies diabetes. Denies thyroid Problems.   Respiratory: Denies cough. Denies shortness of breath. Denies shortness of breath with exertion. Denies hemoptysis.   Cardiovascular: Denies chest pain at rest. Denies chest pain with exertion. Denies palpitations.    Gastrointestinal: Denies abdominal pain. Denies diarrhea. Denies nausea. Denies vomiting. Denies hematemesis or hematochezia. Denies heartburn.  Genitourinary: Denies blood in urine.  Musculoskeletal: See HPI for details  Integumentary: Denies rash. Denies photosensitivity.   Peripheral Vascular: Denies Ulcers of hands and/or feet. Denies Cold extremities.   Neurologic: Denies dizziness. Denies headache.  Denies loss of strength. Denies numbness or tingling.   Psychiatric: Denies depression. Denies anxiety. Denies suicidal/homicidal ideations.      Objective:     BP (!) 133/94 (BP Location: Left arm, Patient Position: Sitting)   Pulse 78   Temp 98.4 °F (36.9 °C) (Oral)   Resp 16   Ht 5' 9" (1.753 m)   Wt 90.3 kg (199 lb)   SpO2 98%   BMI 29.39 kg/m²     Physical Exam    General Appearance: alert, pleasant, in no acute distress.  Skin: Skin color, texture, turgor normal. No rashes or lesions.  Eyes:  extraocular movement " intact (EOMI), pupils equal, round, reactive to light and accommodation, conjunctiva clear.  ENT: No oral or nasal ulcers.  Neck:  Neck supple. No adenopathy.   Lungs: CTA throughout without crackles, rhonchi, or wheezes.   Heart: RRR w/o murmurs.  No edema.   Abdomen: Soft, non-tender, no masses, rebound or guarding.  Neuro: Alert, oriented, CN II-XII GI, sensory and motor innervation intact.  Musculoskeletal: Slight swelling to right knee with effusion, no warmth. Limited ROM to left wrist, mild, normal exam on right. No tenderness in MCPs or PIPs, No active synovitis on exam.   Synovitis in bilateral ankles, left wrist, hand and right knee has resolved.  Psych: Alert, oriented, normal eye contact.    Labs:     Lab Results   Component Value Date    WBC 7.6 2023    HGB 13.7 (L) 2023    HCT 40.9 (L) 2023     2023    ALT 39 2023    AST 22 2023    BUN 15.0 2023    CREATININE 1.05 2023     2023    K 4.3 2023    CO2 26 2023    CRP 4.90 2023    SEDRATE 9 2023: Hep B and C negative. HIV negative. Uric acid normal. ANCA negative. HLAB 27 positive. RPR and syphilis ab positive.  Chlamydia and gonorrhea PCR negative.  BASIL negative.  Rheumatoid factor and anti CCP negative.  EBV IgM negative, IgG positive.  Parvovirus B19 not detected.  EBV DNA not detected. Blood culture negative.   2022:  WBC count 17.7.  Hemoglobin 13.4.  Platelet count elevated 447.  ESR elevated 128, normal less than 15.  Calcium slightly elevated 10.8.  Alk phos 172.  CMP okay.  ALT upper limit of normal.  CRP elevated 131.    Imagin22:  X-ray of left wrist showed possible small avulsion injury at the distal tip of the ulnar styloid process, no erosions.  X-ray of left ankle showed soft tissue swelling on lateral side.  Normal x-ray of right ankle.  Normal x-ray of right knee.    2022:  Echocardiogram showed EF 64%, normal  ejection fraction.  Normal right ventricular function.  Normal left ventricular systolic and diastolic function.    Assessment:       ICD-10-CM ICD-9-CM   1. Spondylo-arthropathy  M47.819 721.90   2. Rheumatoid arthritis, seronegative, multiple sites  M06.09 714.0   3. HLA-B27 positive arthropathy  M12.80 716.80   4. Chronic pain of right knee  M25.561 719.46    G89.29 338.29   5. High risk medication use  Z79.899 V58.69   6. Positive serology for syphilis  A53.0 097.1   7. Polyarthralgia  M25.50 719.49          Plan:     1. HLA-B27 positive arthropathy: P/w synovitis of left right, right knee and bilateral ankles.   -Discussed course and treatment options of spondyloarthropathy with the patient.    - Negative RF and anti CCP. BASIL negative. HLA B27 Positive. Work up negative for EBV, Parvovirus, Chlamydia and Gonorrhea and blood cultures negative. No rashes, ECHO normal with no vegetations. Hep panel, HIV negative.   - no active synovitis on exam today. Limited ROM of left wrist, will repeat X-ray.  -Continue  MTX 20 mg po qweek and folic acid 1 mg daily.   - Continue Humira 40 mg subq every other week.   -Lab today  -Chest Xray and bilateral wrist xray today.      2. Rheumatoid arthritis, seronegative, multiple sites   -See above     3. High risk medication use   -Advised to stay up-to-date on age appropriate vaccinations and malignancy screening with PCP.   -Persons with rheumatoid arthritis, lupus, psoriatic arthritis and other autoimmune diseases are at increased risk of cardiovascular disease including heart attack and stroke. We recommend that all patients with these conditions have annual health maintenance exams including lipid measurements, blood pressure measurements, and smoking cessation counseling when applicable at their primary care provider's office.      4. Chronic pain of right knee and secondary DJD:   -Secondary to infection he had as a kid and secondary arthritis.      5. History of Syphilis:  Treated with PCN in September 2022 at [facility].          Follow up in about 3 months (around 4/17/2023). In addition to their scheduled follow up, the patient has also been instructed to follow up on as needed basis.        Total time spent with patient and documentation is more than 30 minutes. All questions were answered to patient's satisfaction and patient verbalized understanding.

## 2023-01-17 ENCOUNTER — HOSPITAL ENCOUNTER (OUTPATIENT)
Dept: RADIOLOGY | Facility: HOSPITAL | Age: 43
Discharge: HOME | End: 2023-01-17
Attending: INTERNAL MEDICINE
Payer: MEDICARE

## 2023-01-17 ENCOUNTER — OFFICE VISIT (OUTPATIENT)
Dept: RHEUMATOLOGY | Facility: CLINIC | Age: 43
End: 2023-01-17
Payer: MEDICARE

## 2023-01-17 VITALS
DIASTOLIC BLOOD PRESSURE: 94 MMHG | WEIGHT: 199 LBS | HEART RATE: 78 BPM | OXYGEN SATURATION: 98 % | TEMPERATURE: 98 F | BODY MASS INDEX: 29.47 KG/M2 | SYSTOLIC BLOOD PRESSURE: 133 MMHG | RESPIRATION RATE: 16 BRPM | HEIGHT: 69 IN

## 2023-01-17 DIAGNOSIS — M12.80 HLA-B27 POSITIVE ARTHROPATHY: ICD-10-CM

## 2023-01-17 DIAGNOSIS — M06.09 RHEUMATOID ARTHRITIS, SERONEGATIVE, MULTIPLE SITES: ICD-10-CM

## 2023-01-17 DIAGNOSIS — M25.50 POLYARTHRALGIA: ICD-10-CM

## 2023-01-17 DIAGNOSIS — M47.819 SPONDYLO-ARTHROPATHY: Primary | ICD-10-CM

## 2023-01-17 DIAGNOSIS — Z79.899 HIGH RISK MEDICATION USE: ICD-10-CM

## 2023-01-17 DIAGNOSIS — G89.29 CHRONIC PAIN OF RIGHT KNEE: ICD-10-CM

## 2023-01-17 DIAGNOSIS — A53.0 POSITIVE SEROLOGY FOR SYPHILIS: ICD-10-CM

## 2023-01-17 DIAGNOSIS — M25.561 CHRONIC PAIN OF RIGHT KNEE: ICD-10-CM

## 2023-01-17 PROCEDURE — 99215 OFFICE O/P EST HI 40 MIN: CPT | Mod: PBBFAC,25 | Performed by: INTERNAL MEDICINE

## 2023-01-17 PROCEDURE — 99214 OFFICE O/P EST MOD 30 MIN: CPT | Mod: S$PBB,,, | Performed by: INTERNAL MEDICINE

## 2023-01-17 PROCEDURE — 3008F PR BODY MASS INDEX (BMI) DOCUMENTED: ICD-10-PCS | Mod: CPTII,,, | Performed by: INTERNAL MEDICINE

## 2023-01-17 PROCEDURE — 3080F PR MOST RECENT DIASTOLIC BLOOD PRESSURE >= 90 MM HG: ICD-10-PCS | Mod: CPTII,,, | Performed by: INTERNAL MEDICINE

## 2023-01-17 PROCEDURE — 1160F RVW MEDS BY RX/DR IN RCRD: CPT | Mod: CPTII,,, | Performed by: INTERNAL MEDICINE

## 2023-01-17 PROCEDURE — 3075F PR MOST RECENT SYSTOLIC BLOOD PRESS GE 130-139MM HG: ICD-10-PCS | Mod: CPTII,,, | Performed by: INTERNAL MEDICINE

## 2023-01-17 PROCEDURE — 3008F BODY MASS INDEX DOCD: CPT | Mod: CPTII,,, | Performed by: INTERNAL MEDICINE

## 2023-01-17 PROCEDURE — 73130 X-RAY EXAM OF HAND: CPT | Mod: TC,RT

## 2023-01-17 PROCEDURE — 1160F PR REVIEW ALL MEDS BY PRESCRIBER/CLIN PHARMACIST DOCUMENTED: ICD-10-PCS | Mod: CPTII,,, | Performed by: INTERNAL MEDICINE

## 2023-01-17 PROCEDURE — 3080F DIAST BP >= 90 MM HG: CPT | Mod: CPTII,,, | Performed by: INTERNAL MEDICINE

## 2023-01-17 PROCEDURE — 71046 X-RAY EXAM CHEST 2 VIEWS: CPT | Mod: TC

## 2023-01-17 PROCEDURE — 1159F MED LIST DOCD IN RCRD: CPT | Mod: CPTII,,, | Performed by: INTERNAL MEDICINE

## 2023-01-17 PROCEDURE — 73130 X-RAY EXAM OF HAND: CPT | Mod: TC,LT

## 2023-01-17 PROCEDURE — 3075F SYST BP GE 130 - 139MM HG: CPT | Mod: CPTII,,, | Performed by: INTERNAL MEDICINE

## 2023-01-17 PROCEDURE — 1159F PR MEDICATION LIST DOCUMENTED IN MEDICAL RECORD: ICD-10-PCS | Mod: CPTII,,, | Performed by: INTERNAL MEDICINE

## 2023-01-17 PROCEDURE — 99214 PR OFFICE/OUTPT VISIT, EST, LEVL IV, 30-39 MIN: ICD-10-PCS | Mod: S$PBB,,, | Performed by: INTERNAL MEDICINE

## 2023-01-17 RX ORDER — CIPROFLOXACIN 500 MG/1
500 TABLET ORAL 2 TIMES DAILY
COMMUNITY
Start: 2022-11-11 | End: 2023-01-17 | Stop reason: ALTCHOICE

## 2023-01-17 RX ORDER — NAPROXEN 500 MG/1
TABLET ORAL
COMMUNITY
Start: 2022-12-29 | End: 2023-04-04 | Stop reason: SDUPTHER

## 2023-01-17 RX ORDER — METHOTREXATE 2.5 MG/1
TABLET ORAL
Qty: 30 TABLET | Refills: 3 | Status: SHIPPED | OUTPATIENT
Start: 2023-01-17 | End: 2023-04-17 | Stop reason: SDUPTHER

## 2023-01-17 RX ORDER — GABAPENTIN 100 MG/1
100 CAPSULE ORAL 3 TIMES DAILY
COMMUNITY
Start: 2022-10-28 | End: 2023-01-17 | Stop reason: ALTCHOICE

## 2023-01-17 RX ORDER — FLUCONAZOLE 200 MG/1
200 TABLET ORAL
COMMUNITY
Start: 2022-08-17 | End: 2023-01-17 | Stop reason: ALTCHOICE

## 2023-01-17 RX ORDER — ADALIMUMAB 40MG/0.8ML
KIT SUBCUTANEOUS
COMMUNITY
Start: 2022-12-05 | End: 2023-01-17 | Stop reason: ALTCHOICE

## 2023-01-17 RX ORDER — KETOCONAZOLE 20 MG/G
CREAM TOPICAL
COMMUNITY
Start: 2022-11-18 | End: 2023-05-08 | Stop reason: ALTCHOICE

## 2023-01-17 RX ORDER — SUCRALFATE 1 G/1
1 TABLET ORAL 2 TIMES DAILY
COMMUNITY
Start: 2022-09-06 | End: 2023-01-17 | Stop reason: ALTCHOICE

## 2023-01-17 RX ORDER — SULFAMETHOXAZOLE AND TRIMETHOPRIM 800; 160 MG/1; MG/1
1 TABLET ORAL 2 TIMES DAILY
COMMUNITY
Start: 2022-11-18 | End: 2023-01-17 | Stop reason: ALTCHOICE

## 2023-01-17 RX ORDER — HYDROCODONE BITARTRATE AND ACETAMINOPHEN 10; 325 MG/1; MG/1
1 TABLET ORAL 4 TIMES DAILY PRN
COMMUNITY
Start: 2022-11-18 | End: 2023-01-17 | Stop reason: ALTCHOICE

## 2023-01-17 RX ORDER — FLUTICASONE PROPIONATE 50 MCG
1 SPRAY, SUSPENSION (ML) NASAL
COMMUNITY
Start: 2022-09-23 | End: 2023-05-08 | Stop reason: ALTCHOICE

## 2023-01-17 RX ORDER — FUROSEMIDE 40 MG/1
40 TABLET ORAL
COMMUNITY
Start: 2022-11-18 | End: 2023-01-17 | Stop reason: ALTCHOICE

## 2023-01-17 RX ORDER — CYCLOBENZAPRINE HCL 10 MG
10 TABLET ORAL 3 TIMES DAILY
COMMUNITY
Start: 2022-07-26 | End: 2023-01-17 | Stop reason: ALTCHOICE

## 2023-01-17 RX ORDER — MONTELUKAST SODIUM 10 MG/1
10 TABLET ORAL
COMMUNITY
Start: 2022-10-21 | End: 2023-01-17 | Stop reason: ALTCHOICE

## 2023-01-17 RX ORDER — CONTAINER,EMPTY
EACH MISCELLANEOUS
COMMUNITY
Start: 2022-12-16

## 2023-01-17 RX ORDER — ADALIMUMAB 40MG/0.4ML
40 KIT SUBCUTANEOUS
Qty: 2 PEN | Refills: 2 | Status: SHIPPED | OUTPATIENT
Start: 2023-01-17 | End: 2023-05-08

## 2023-01-17 RX ORDER — METHOCARBAMOL 750 MG/1
750 TABLET, FILM COATED ORAL 3 TIMES DAILY
COMMUNITY
Start: 2022-09-21 | End: 2023-01-17 | Stop reason: ALTCHOICE

## 2023-01-17 RX ORDER — FOLIC ACID 1 MG/1
1 TABLET ORAL DAILY
Qty: 30 TABLET | Refills: 6 | Status: SHIPPED | OUTPATIENT
Start: 2023-01-17 | End: 2023-04-17 | Stop reason: SDUPTHER

## 2023-01-17 RX ORDER — UBIQUINOL 100 MG
CAPSULE ORAL
COMMUNITY
Start: 2023-01-12

## 2023-01-17 RX ORDER — METRONIDAZOLE 500 MG/1
500 TABLET ORAL 3 TIMES DAILY
COMMUNITY
Start: 2022-11-11 | End: 2023-01-17 | Stop reason: ALTCHOICE

## 2023-01-17 RX ORDER — CIPROFLOXACIN AND DEXAMETHASONE 3; 1 MG/ML; MG/ML
SUSPENSION/ DROPS AURICULAR (OTIC)
COMMUNITY
Start: 2022-08-17 | End: 2023-05-08 | Stop reason: ALTCHOICE

## 2023-02-07 ENCOUNTER — TELEPHONE (OUTPATIENT)
Dept: RHEUMATOLOGY | Facility: CLINIC | Age: 43
End: 2023-02-07
Payer: MEDICARE

## 2023-02-07 NOTE — TELEPHONE ENCOUNTER
----- Message from Opal Best sent at 2/7/2023 11:59 AM CST -----  Regarding: Med refill  Pt called and stated he has no more refills of his METHOTREXATE to be sent to [facility]. Pt can be reached @ [phone]          Thank You  Sydney LINN

## 2023-02-07 NOTE — TELEPHONE ENCOUNTER
MTX script sent 1/2023 w/3 refills  Called [facility] and verified they have that script and it's on hold  Called pt to let him know  He will call pharmacy to f/u

## 2023-03-27 ENCOUNTER — HOSPITAL ENCOUNTER (EMERGENCY)
Facility: HOSPITAL | Age: 43
Discharge: HOME | End: 2023-03-27
Attending: FAMILY MEDICINE
Payer: MEDICARE

## 2023-03-27 VITALS
TEMPERATURE: 99 F | HEART RATE: 100 BPM | DIASTOLIC BLOOD PRESSURE: 72 MMHG | SYSTOLIC BLOOD PRESSURE: 112 MMHG | BODY MASS INDEX: 28.96 KG/M2 | OXYGEN SATURATION: 99 % | WEIGHT: 195.56 LBS | HEIGHT: 69 IN | RESPIRATION RATE: 18 BRPM

## 2023-03-27 DIAGNOSIS — M25.579 PAIN IN JOINT INVOLVING ANKLE AND FOOT, UNSPECIFIED LATERALITY: ICD-10-CM

## 2023-03-27 DIAGNOSIS — M25.50 POLYARTHRALGIA: Primary | ICD-10-CM

## 2023-03-27 LAB
ALBUMIN SERPL-MCNC: 3.3 G/DL (ref 3.5–5)
ALBUMIN/GLOB SERPL: 0.8 RATIO (ref 1.1–2)
ALP SERPL-CCNC: 77 UNIT/L (ref 40–150)
ALT SERPL-CCNC: 12 UNIT/L (ref 0–55)
AST SERPL-CCNC: 15 UNIT/L (ref 5–34)
BASOPHILS # BLD AUTO: 0.01 X10(3)/MCL (ref 0–0.2)
BASOPHILS NFR BLD AUTO: 0.1 %
BILIRUBIN DIRECT+TOT PNL SERPL-MCNC: 0.5 MG/DL
BUN SERPL-MCNC: 16 MG/DL (ref 8.9–20.6)
CALCIUM SERPL-MCNC: 9.8 MG/DL (ref 8.4–10.2)
CHLORIDE SERPL-SCNC: 107 MMOL/L (ref 98–107)
CO2 SERPL-SCNC: 26 MMOL/L (ref 22–29)
CREAT SERPL-MCNC: 1.06 MG/DL (ref 0.73–1.18)
CRP SERPL-MCNC: 203.7 MG/L
EOSINOPHIL # BLD AUTO: 0.05 X10(3)/MCL (ref 0–0.9)
EOSINOPHIL NFR BLD AUTO: 0.7 %
ERYTHROCYTE [DISTWIDTH] IN BLOOD BY AUTOMATED COUNT: 13.9 % (ref 11.5–17)
ERYTHROCYTE [SEDIMENTATION RATE] IN BLOOD: 21 MM/HR (ref 0–15)
GFR SERPLBLD CREATININE-BSD FMLA CKD-EPI: >60 MLS/MIN/1.73/M2
GLOBULIN SER-MCNC: 4.4 GM/DL (ref 2.4–3.5)
GLUCOSE SERPL-MCNC: 108 MG/DL (ref 74–100)
HCT VFR BLD AUTO: 35.9 % (ref 42–52)
HGB BLD-MCNC: 12.3 G/DL (ref 14–18)
IMM GRANULOCYTES # BLD AUTO: 0.04 X10(3)/MCL (ref 0–0.04)
IMM GRANULOCYTES NFR BLD AUTO: 0.5 %
LYMPHOCYTES # BLD AUTO: 1.77 X10(3)/MCL (ref 0.6–4.6)
LYMPHOCYTES NFR BLD AUTO: 23.1 %
MCH RBC QN AUTO: 28.4 PG (ref 27–31)
MCHC RBC AUTO-ENTMCNC: 34.3 G/DL (ref 33–36)
MCV RBC AUTO: 82.9 FL (ref 80–94)
MONOCYTES # BLD AUTO: 0.8 X10(3)/MCL (ref 0.1–1.3)
MONOCYTES NFR BLD AUTO: 10.5 %
NEUTROPHILS # BLD AUTO: 4.98 X10(3)/MCL (ref 2.1–9.2)
NEUTROPHILS NFR BLD AUTO: 65.1 %
NRBC BLD AUTO-RTO: 0 %
PLATELET # BLD AUTO: 275 X10(3)/MCL (ref 130–400)
PMV BLD AUTO: 9.6 FL (ref 7.4–10.4)
POTASSIUM SERPL-SCNC: 3.9 MMOL/L (ref 3.5–5.1)
PROT SERPL-MCNC: 7.7 GM/DL (ref 6.4–8.3)
RBC # BLD AUTO: 4.33 X10(6)/MCL (ref 4.7–6.1)
SODIUM SERPL-SCNC: 140 MMOL/L (ref 136–145)
WBC # SPEC AUTO: 7.7 X10(3)/MCL (ref 4.5–11.5)

## 2023-03-27 PROCEDURE — 80053 COMPREHEN METABOLIC PANEL: CPT | Performed by: PHYSICIAN ASSISTANT

## 2023-03-27 PROCEDURE — 86140 C-REACTIVE PROTEIN: CPT | Performed by: PHYSICIAN ASSISTANT

## 2023-03-27 PROCEDURE — 85651 RBC SED RATE NONAUTOMATED: CPT | Performed by: PHYSICIAN ASSISTANT

## 2023-03-27 PROCEDURE — 85025 COMPLETE CBC W/AUTO DIFF WBC: CPT | Performed by: PHYSICIAN ASSISTANT

## 2023-03-27 PROCEDURE — 99283 EMERGENCY DEPT VISIT LOW MDM: CPT

## 2023-03-27 RX ORDER — PREDNISONE 20 MG/1
TABLET ORAL
Qty: 11 TABLET | Refills: 0 | Status: SHIPPED | OUTPATIENT
Start: 2023-03-27 | End: 2023-04-17 | Stop reason: ALTCHOICE

## 2023-03-27 NOTE — ED PROVIDER NOTES
Encounter Date: 3/27/2023       History     Chief Complaint   Patient presents with    Foot Pain     Reports bilateral foot and ankle pain x3 weeks without injury. Ambulates with canes.      44 YO WM in ER with complaints of bilateral ankle and foot pain with swelling X 3 weeks. No injury/trauma. Hx of RA and polyarthralgia. Denies fever, chills, chest pain, SOB, abdominal pain, N/V/D, HA or dizziness. No other complaints.     The history is provided by the patient.   Review of patient's allergies indicates:  No Known Allergies  Past Medical History:   Diagnosis Date    Other specified noninfective gastroenteritis and colitis     Syphilis, unspecified      Past Surgical History:   Procedure Laterality Date    SPINE SURGERY       Family History   Problem Relation Age of Onset    Cancer Father      Social History     Tobacco Use    Smoking status: Never    Smokeless tobacco: Never   Substance Use Topics    Alcohol use: Never    Drug use: Never     Review of Systems   Constitutional:  Negative for chills and fever.   HENT:  Negative for congestion and trouble swallowing.    Respiratory:  Negative for shortness of breath and wheezing.    Cardiovascular:  Negative for chest pain and leg swelling.   Gastrointestinal:  Negative for abdominal pain, diarrhea, nausea and vomiting.   Musculoskeletal:  Positive for arthralgias and joint swelling.   Skin:  Negative for rash.   Neurological:  Negative for dizziness, weakness and headaches.   All other systems reviewed and are negative.    Physical Exam     Initial Vitals [03/27/23 1018]   BP Pulse Resp Temp SpO2   107/69 (!) 113 20 99.1 °F (37.3 °C) 100 %      MAP       --         Physical Exam    Nursing note and vitals reviewed.  Constitutional: He appears well-developed and well-nourished.   HENT:   Head: Normocephalic and atraumatic.   Nose: Nose normal.   Eyes: Conjunctivae are normal.   Neck: Neck supple.   Normal range of motion.  Cardiovascular:  Normal rate, regular  rhythm and intact distal pulses.           Pulmonary/Chest: Breath sounds normal.   Musculoskeletal:         General: Normal range of motion.      Cervical back: Normal range of motion and neck supple.      Right ankle: Swelling present. Tenderness present over the lateral malleolus and medial malleolus. Normal range of motion. Normal pulse.      Left ankle: Swelling present. Tenderness present over the lateral malleolus and medial malleolus. Normal range of motion. Normal pulse.      Right foot: Tenderness present. No crepitus. Normal pulse.      Left foot: Tenderness present. No crepitus. Normal pulse.     Neurological: He is alert and oriented to person, place, and time. He has normal strength.   Skin: Skin is dry.   Psychiatric: He has a normal mood and affect.       ED Course   Procedures  Labs Reviewed   COMPREHENSIVE METABOLIC PANEL - Abnormal; Notable for the following components:       Result Value    Glucose Level 108 (*)     Albumin Level 3.3 (*)     Globulin 4.4 (*)     Albumin/Globulin Ratio 0.8 (*)     All other components within normal limits   SEDIMENTATION RATE, AUTOMATED - Abnormal; Notable for the following components:    Sed Rate 21 (*)     All other components within normal limits   C-REACTIVE PROTEIN - Abnormal; Notable for the following components:    C-Reactive Protein 203.70 (*)     All other components within normal limits   CBC WITH DIFFERENTIAL - Abnormal; Notable for the following components:    RBC 4.33 (*)     Hgb 12.3 (*)     Hct 35.9 (*)     All other components within normal limits   CBC W/ AUTO DIFFERENTIAL    Narrative:     The following orders were created for panel order CBC auto differential.  Procedure                               Abnormality         Status                     ---------                               -----------         ------                     CBC with Differential[[id number]]        Abnormal            Final result                 Please view results for  these tests on the individual orders.   EXTRA TUBES    Narrative:     The following orders were created for panel order EXTRA TUBES.  Procedure                               Abnormality         Status                     ---------                               -----------         ------                     Light Blue Top Hold[[id number]]                              In process                 Gold Top Hold[[id number]]                                    In process                   Please view results for these tests on the individual orders.   LIGHT BLUE TOP HOLD   GOLD TOP HOLD          Imaging Results    None          Medications - No data to display              ED Course as of 03/27/23 1229   Mon Mar 27, 2023   1222 VSS, NAD, pt is non-toxic or ill appearing, labs  reviewed with pt, joints are not warm or erythematous, no signs of infection at thsi time, will give Rx for prednisone with taper at this time, he should follow up with Rheumatology clinic on 4/17/23, he can even call to see if they any earlier appointment for follow up, treatment plan and discharge instructions including follow up discussed, pt verbalized understanding, all questions answered, pt is stable and ready for discharge  [TT]      ED Course User Index  [TT] YESENIA Culver                 Clinical Impression:   Final diagnoses:  [M25.50] Polyarthralgia (Primary)  [M25.579] Pain in joint involving ankle and foot, unspecified laterality        ED Disposition Condition    Discharge Stable          ED Prescriptions       Medication Sig Dispense Start Date End Date Auth. Provider    predniSONE (DELTASONE) 20 MG tablet Take 2 tabs PO for 3 days, then take 1 tab PO daily for 3 days, then take 0.5 tab daily for 3 days 11 tablet 3/27/2023 -- YESENIA Culver          Follow-up Information       Follow up With Specialties Details Why Contact Info    Prisca Contreras MD Internal Medicine Schedule an appointment as soon as possible for a visit in 3  days  [address] [facility] [address] [facility] [address]  [place] [address]  [phone]      [facility] Emergency Medicine In 3 days As needed, If symptoms worsen [address] [facility] [id number] [phone]    Kojo Lopez MD Rheumatology Go on 4/17/2023 call the clinic to see if they have an eariler appointment available [address] [facility] [address]  [phone]               YESENIA Culver  03/27/23 1229

## 2023-03-27 NOTE — DISCHARGE INSTRUCTIONS
Take all medications as prescribed.     Drink plenty of fluids and get a lot of rest.     Follow up with your PCP and Rheumatologist.    Return to ER for any changes or worsening of symptoms.

## 2023-03-31 ENCOUNTER — PATIENT MESSAGE (OUTPATIENT)
Dept: RHEUMATOLOGY | Facility: CLINIC | Age: 43
End: 2023-03-31
Payer: MEDICARE

## 2023-04-03 ENCOUNTER — PATIENT MESSAGE (OUTPATIENT)
Dept: RHEUMATOLOGY | Facility: CLINIC | Age: 43
End: 2023-04-03
Payer: MEDICARE

## 2023-04-04 RX ORDER — NAPROXEN 500 MG/1
500 TABLET ORAL 2 TIMES DAILY
Qty: 60 TABLET | Refills: 1 | Status: SHIPPED | OUTPATIENT
Start: 2023-04-04 | End: 2023-05-04

## 2023-04-11 ENCOUNTER — PATIENT MESSAGE (OUTPATIENT)
Dept: RHEUMATOLOGY | Facility: CLINIC | Age: 43
End: 2023-04-11
Payer: MEDICARE

## 2023-04-17 ENCOUNTER — OFFICE VISIT (OUTPATIENT)
Dept: RHEUMATOLOGY | Facility: CLINIC | Age: 43
End: 2023-04-17
Payer: MEDICARE

## 2023-04-17 VITALS
OXYGEN SATURATION: 98 % | HEIGHT: 69 IN | WEIGHT: 189 LBS | HEART RATE: 100 BPM | TEMPERATURE: 100 F | RESPIRATION RATE: 16 BRPM | DIASTOLIC BLOOD PRESSURE: 55 MMHG | BODY MASS INDEX: 27.99 KG/M2 | SYSTOLIC BLOOD PRESSURE: 91 MMHG

## 2023-04-17 DIAGNOSIS — M25.561 CHRONIC PAIN OF RIGHT KNEE: ICD-10-CM

## 2023-04-17 DIAGNOSIS — M12.80 HLA-B27 POSITIVE ARTHROPATHY: Primary | ICD-10-CM

## 2023-04-17 DIAGNOSIS — A53.0 POSITIVE SEROLOGY FOR SYPHILIS: ICD-10-CM

## 2023-04-17 DIAGNOSIS — G89.29 CHRONIC PAIN OF RIGHT KNEE: ICD-10-CM

## 2023-04-17 DIAGNOSIS — Z79.899 HIGH RISK MEDICATION USE: ICD-10-CM

## 2023-04-17 DIAGNOSIS — M47.819 SPONDYLO-ARTHROPATHY: ICD-10-CM

## 2023-04-17 DIAGNOSIS — M06.09 RHEUMATOID ARTHRITIS, SERONEGATIVE, MULTIPLE SITES: ICD-10-CM

## 2023-04-17 PROCEDURE — 99215 OFFICE O/P EST HI 40 MIN: CPT | Mod: S$PBB,,, | Performed by: INTERNAL MEDICINE

## 2023-04-17 PROCEDURE — 99215 PR OFFICE/OUTPT VISIT, EST, LEVL V, 40-54 MIN: ICD-10-PCS | Mod: S$PBB,,, | Performed by: INTERNAL MEDICINE

## 2023-04-17 PROCEDURE — 3008F BODY MASS INDEX DOCD: CPT | Mod: CPTII,,, | Performed by: INTERNAL MEDICINE

## 2023-04-17 PROCEDURE — 3008F PR BODY MASS INDEX (BMI) DOCUMENTED: ICD-10-PCS | Mod: CPTII,,, | Performed by: INTERNAL MEDICINE

## 2023-04-17 PROCEDURE — 1159F MED LIST DOCD IN RCRD: CPT | Mod: CPTII,,, | Performed by: INTERNAL MEDICINE

## 2023-04-17 PROCEDURE — 3078F DIAST BP <80 MM HG: CPT | Mod: CPTII,,, | Performed by: INTERNAL MEDICINE

## 2023-04-17 PROCEDURE — 99214 OFFICE O/P EST MOD 30 MIN: CPT | Mod: PBBFAC | Performed by: INTERNAL MEDICINE

## 2023-04-17 PROCEDURE — 1159F PR MEDICATION LIST DOCUMENTED IN MEDICAL RECORD: ICD-10-PCS | Mod: CPTII,,, | Performed by: INTERNAL MEDICINE

## 2023-04-17 PROCEDURE — 3074F SYST BP LT 130 MM HG: CPT | Mod: CPTII,,, | Performed by: INTERNAL MEDICINE

## 2023-04-17 PROCEDURE — 3074F PR MOST RECENT SYSTOLIC BLOOD PRESSURE < 130 MM HG: ICD-10-PCS | Mod: CPTII,,, | Performed by: INTERNAL MEDICINE

## 2023-04-17 PROCEDURE — 3078F PR MOST RECENT DIASTOLIC BLOOD PRESSURE < 80 MM HG: ICD-10-PCS | Mod: CPTII,,, | Performed by: INTERNAL MEDICINE

## 2023-04-17 RX ORDER — METHOTREXATE 2.5 MG/1
TABLET ORAL
Qty: 40 TABLET | Refills: 3 | Status: SHIPPED | OUTPATIENT
Start: 2023-04-17 | End: 2023-05-08 | Stop reason: SDUPTHER

## 2023-04-17 RX ORDER — FOLIC ACID 1 MG/1
1 TABLET ORAL DAILY
Qty: 30 TABLET | Refills: 6 | Status: SHIPPED | OUTPATIENT
Start: 2023-04-17 | End: 2023-05-08 | Stop reason: SDUPTHER

## 2023-04-17 RX ORDER — PANTOPRAZOLE SODIUM 40 MG
40 TABLET, DELAYED RELEASE (ENTERIC COATED) ORAL DAILY
COMMUNITY
Start: 2023-02-28

## 2023-04-17 RX ORDER — AZITHROMYCIN 500 MG/1
500 TABLET, FILM COATED ORAL DAILY
Qty: 5 TABLET | Refills: 0 | Status: SHIPPED | OUTPATIENT
Start: 2023-04-17 | End: 2023-04-22

## 2023-04-17 NOTE — PROGRESS NOTES
"    Patient ID: [id number]     Chief Complaint: Follow-up (Routine follow up for Arthritis. )      HPI:     Trey Harper is a 43 y.o. male here today for follow up of spondyloarthritis, +HLA-B27.      He is currently taking 15 mg of MTX every Friday and tolerating well with folic acid.   Currently taking Humira 40 mg SQ every other week. Tolerating it well with out issues.   Admits chronic sinus infections.  H/o burning urination few weeks back, better now. Will check UA and culture.  Admits nausea, started this morning. Admits one episode of vomiting this morning.   C/o flares of arthritis, medications not helping him much like in the past. C/o pain in hands, knees and ankles with swelling.   He does still have some joint pain to his wrist, left ankle with some "popping" and his right knee, most likely right knee pain from previous history of septic arthritis as a child.   He has also been discharged from home PT.     PMH: GERD and chronic right knee pain.  Denies fevers, rashes, oral and nasal ulcers, history of DVT or PE, history of stroke or seizures, history of MI, history of malignancies, Raynaud's phenomenon, history of inflammatory eye diseases, history of inflammatory bowel disease.  Family history of autoimmune disease: denies.   Smoking: none    History: Presented to ED initially on 11/22/2022 complaining of pain and swelling in left wrist, left hand, bilateral ankles and right knee. Started with pain and swelling in left wrist and progress to bilateral ankles and right knee.  Denies fevers or recent travel.  History of bloody diarrhea in second week of 11/2022 in which he presented to ER for eval.  He was treated with ciprofloxacin and Flagyl at the time.  Diarrhea has resolved.  Denies history of rashes, oral or nasal or genital ulcers. This is the first episode he had swelling and pain in multiple joints, no similar issues in the past.  History of septic arthritis in right knee when he was 5 to 6 " years old. 2022 HLA-B27 + during hospital stay.   He previously tested positive for syphilis recently and he was treated with penicillin in 2022.     Social History     Tobacco Use   Smoking Status Never   Smokeless Tobacco Never          ----------------------------  Other specified noninfective gastroenteritis and colitis  Syphilis, unspecified     Past Surgical History:   Procedure Laterality Date    SPINE SURGERY         Review of patient's allergies indicates:  No Known Allergies    Outpatient Medications Marked as Taking for the 23 encounter (Office Visit) with Kojo Lopez MD   Medication Sig Dispense Refill    ALCOHOL PREP PADS PadM SMARTSIG:Pledget(s) Topical      [] naproxen (NAPROSYN) 500 MG tablet Take 1 tablet (500 mg total) by mouth 2 (two) times daily. With food as needed for joint pain 60 tablet 1    SHARPS CONTAINER use as directed      [DISCONTINUED] folic acid (FOLVITE) 1 MG tablet Take 1 tablet (1 mg total) by mouth once daily. 30 tablet 6    [DISCONTINUED] methotrexate 2.5 MG Tab Take 6 tablets once a week. Hold for infection or fever. 30 tablet 3       Social History     Socioeconomic History    Marital status: Single   Tobacco Use    Smoking status: Never    Smokeless tobacco: Never   Substance and Sexual Activity    Alcohol use: Never    Drug use: Never    Sexual activity: Not Currently     Partners: Male        Family History   Problem Relation Age of Onset    Cancer Father         Immunization History   Administered Date(s) Administered    DT (Pediatric) 1984, 1994    DTP 1980, 1980, 1980, 10/22/1981    MMR 1981    OPV 1980, 1980, 1980, 10/22/1981       Patient Care Team:  Prisca Contreras MD as PCP - General (Internal Medicine)     Subjective:     ROS    Constitutional:  Denies chills. Denies fever. Denies night sweats. Denies weight loss.   Ophthalmology: Denies blurred vision. Denies dry eyes. Denies eye pain.  "Denies Itching and redness.   ENT: Denies oral ulcers. Denies epistaxis. Denies dry mouth. Denies swollen glands.   Endocrine: Denies diabetes. Denies thyroid Problems.   Respiratory: Denies cough. Denies shortness of breath. Denies shortness of breath with exertion. Denies hemoptysis.   Cardiovascular: Denies chest pain at rest. Denies chest pain with exertion. Denies palpitations.    Gastrointestinal: Denies abdominal pain. Denies diarrhea. Admits nausea. Admits vomiting. Denies hematemesis or hematochezia. Denies heartburn.  Genitourinary: Denies blood in urine.  Musculoskeletal: See HPI for details  Integumentary: Denies rash. Denies photosensitivity.   Peripheral Vascular: Denies Ulcers of hands and/or feet. Denies Cold extremities.   Neurologic: Denies dizziness. Denies headache.  Denies loss of strength. Denies numbness or tingling.   Psychiatric: Denies depression. Denies anxiety. Denies suicidal/homicidal ideations.      Objective:     BP (!) 91/55   Pulse 100   Temp 100.3 °F (37.9 °C)   Resp 16   Ht 5' 9" (1.753 m)   Wt 85.7 kg (189 lb)   SpO2 98%   BMI 27.91 kg/m²     Physical Exam    General Appearance: alert, pleasant, in no acute distress.  Skin: Skin color, texture, turgor normal. No rashes or lesions.  Eyes:  extraocular movement intact (EOMI), pupils equal, round, reactive to light and accommodation, conjunctiva clear.  ENT: No oral or nasal ulcers.  Neck:  Neck supple. No adenopathy.   Lungs: CTA throughout without crackles, rhonchi, or wheezes.   Heart: RRR w/o murmurs.  No edema.   Abdomen: Soft, non-tender, no masses, rebound or guarding.  Neuro: Alert, oriented, CN II-XII GI, sensory and motor innervation intact.  Musculoskeletal: Slight swelling to right knee with effusion, no warmth. Limited ROM to left wrist, mild, normal exam on right. Swelling and tenderness in bilateral MCP's.  Psych: Alert, oriented, normal eye contact.    Labs:     Lab Results   Component Value Date    WBC 7.7 " 2023    HGB 12.3 (L) 2023    HCT 35.9 (L) 2023     2023    ALT 12 2023    AST 15 2023    BUN 16.0 2023    CREATININE 1.06 2023     2023    K 3.9 2023    CO2 26 2023    .70 (H) 2023    SEDRATE 21 (H) 2023: Hep B and C negative. HIV negative. Uric acid normal. ANCA negative. HLAB 27 positive. RPR and syphilis ab positive.  Chlamydia and gonorrhea PCR negative.  BASIL negative.  Rheumatoid factor and anti CCP negative.  EBV IgM negative, IgG positive.  Parvovirus B19 not detected.  EBV DNA not detected. Blood culture negative.   2022:  WBC count 17.7.  Hemoglobin 13.4.  Platelet count elevated 447.  ESR elevated 128, normal less than 15.  Calcium slightly elevated 10.8.  Alk phos 172.  CMP okay.  ALT upper limit of normal.  CRP elevated 131.  23:  CBC, CMP okay.  ESR, CRP normal. Quantiferon tb negative.   3/27/23: CBC ok. ESR 21, . CMP ok.     Imagin22:  X-ray of left wrist showed possible small avulsion injury at the distal tip of the ulnar styloid process, no erosions.  X-ray of left ankle showed soft tissue swelling on lateral side.  Normal x-ray of right ankle.  Normal x-ray of right knee.  23:  Normal chest x-ray.  X-ray of right hand showed mild DJD.  X-ray of left hand showed DJD.    2022:  Echocardiogram showed EF 64%, normal ejection fraction.  Normal right ventricular function.  Normal left ventricular systolic and diastolic function.    Assessment:       ICD-10-CM ICD-9-CM   1. HLA-B27 positive arthropathy  M12.80 716.80   2. Spondylo-arthropathy  M47.819 721.90   3. Chronic pain of right knee  M25.561 719.46    G89.29 338.29   4. Positive serology for syphilis  A53.0 097.1   5. High risk medication use  Z79.899 V58.69   6. Rheumatoid arthritis, seronegative, multiple sites  M06.09 714.0            Plan:     1. HLA-B27 positive arthropathy: P/w synovitis of  left right, right knee and bilateral ankles.   -Discussed course and treatment options of spondyloarthropathy with the patient.    - Negative RF and anti CCP. BASIL negative. HLA B27 Positive. Work up negative for EBV, Parvovirus, Chlamydia and Gonorrhea and blood cultures negative. No rashes, ECHO normal with no vegetations. Hep panel, HIV negative.   - Arthritis flaring again. Will check UA, urine GC and Chlamydia.   - Increase MTX dose to 20 mg per week and folic acid 1 mg daily.   - Continue Humira 40 mg subq every other week.   - If continue to have flares, will switch Humira in the future.      2. Rheumatoid arthritis, seronegative, multiple sites   -See above     3. High risk medication use   -Advised to stay up-to-date on age appropriate vaccinations and malignancy screening with PCP.   -Persons with rheumatoid arthritis, lupus, psoriatic arthritis and other autoimmune diseases are at increased risk of cardiovascular disease including heart attack and stroke. We recommend that all patients with these conditions have annual health maintenance exams including lipid measurements, blood pressure measurements, and smoking cessation counseling when applicable at their primary care provider's office.      4. Chronic pain of right knee and secondary DJD:   -Secondary to infection he had as a kid and secondary arthritis.      5. History of Syphilis: Treated with PCN in September 2022 at health unit.          Follow up in about 6 weeks (around 5/29/2023) for with NP. In addition to their scheduled follow up, the patient has also been instructed to follow up on as needed basis.        Total time spent with patient and documentation is more than 40 minutes. All questions were answered to patient's satisfaction and patient verbalized understanding.

## 2023-04-18 ENCOUNTER — TELEPHONE (OUTPATIENT)
Dept: RHEUMATOLOGY | Facility: CLINIC | Age: 43
End: 2023-04-18
Payer: MEDICARE

## 2023-04-18 DIAGNOSIS — R35.0 URINE FREQUENCY: Primary | ICD-10-CM

## 2023-04-18 NOTE — TELEPHONE ENCOUNTER
I have ordered urine chlamydia gonorrhea yesterday, by mistake ordered it as clinic collect.  Please call lab and check with them if they still have the urine sample to run these test.

## 2023-04-19 ENCOUNTER — TELEPHONE (OUTPATIENT)
Dept: INFUSION THERAPY | Facility: HOSPITAL | Age: 43
End: 2023-04-19
Payer: MEDICARE

## 2023-04-19 ENCOUNTER — APPOINTMENT (OUTPATIENT)
Dept: LAB | Facility: HOSPITAL | Age: 43
End: 2023-04-19
Attending: INTERNAL MEDICINE
Payer: MEDICARE

## 2023-04-19 LAB
C TRACH DNA SPEC QL NAA+PROBE: NOT DETECTED
N GONORRHOEA DNA SPEC QL NAA+PROBE: NOT DETECTED

## 2023-04-19 NOTE — TELEPHONE ENCOUNTER
Attempted to contact Mr. Harper by phone this morning.  Left voice mail asking him to come by the lab at [facility] and leave another urine sample for additional testing ordered by Dr. Lopez./francisca

## 2023-04-26 ENCOUNTER — PATIENT MESSAGE (OUTPATIENT)
Dept: RHEUMATOLOGY | Facility: CLINIC | Age: 43
End: 2023-04-26
Payer: MEDICARE

## 2023-04-27 ENCOUNTER — TELEPHONE (OUTPATIENT)
Dept: RHEUMATOLOGY | Facility: CLINIC | Age: 43
End: 2023-04-27
Payer: MEDICARE

## 2023-04-27 NOTE — TELEPHONE ENCOUNTER
Pt called Requesting something for restless legs. Pt stated he will fall asleep then legs will start jumping and sharp pain in feet. This has been occurring since March. Next apt 5/29 with [place] and 9/18/23 w/ Dr montanez

## 2023-04-27 NOTE — TELEPHONE ENCOUNTER
Called and spoke with pt  informed him to call PCP for issues with restless legs. Pt verbalized understanding

## 2023-05-03 ENCOUNTER — PATIENT MESSAGE (OUTPATIENT)
Dept: RHEUMATOLOGY | Facility: CLINIC | Age: 43
End: 2023-05-03
Payer: MEDICARE

## 2023-05-05 NOTE — PROGRESS NOTES
"    Patient ID: [id number]     Chief Complaint: HLA-B27 positive arthropathy (Pt stated to discuss the Humira not working anymore with joint pain. Pain feet,ankle,and knees and lower back pian)    HPI:     Trey Harper is a 43 y.o. male here today for follow up of spondyloarthritis, +HLA-B27.      History of +HLA-B27 diagnosed in 2022 after presentation to ED on 11/22/2022 with c/o of pain and swelling in left wrist, left hand, bilateral ankles and right knee. Started with pain and swelling in left wrist and progress to bilateral ankles and right knee.  Denies fevers or recent travel.  History of bloody diarrhea in second week of 11/2022 in which he presented to ER for eval.  He was treated with ciprofloxacin and Flagyl at the time.  Diarrhea resolved.  Denies history of rashes, oral or nasal or genital ulcers. This is the first episode he had swelling and pain in multiple joints, no similar issues in the past- he was admitted to the hospital for eval.  History of septic arthritis in right knee when he was 5 to 6 years old. 11/22/2022 HLA-B27 + during hospital stay.   He previously tested positive for syphilis and was treated with penicillin in 9/2022.     He is currently taking 15 mg of MTX every Friday and tolerating well with folic acid. Also taking Humira 40 mg SQ every other week. Tolerating it well but has continued to have pain. He was started on Humira in December of 2022, taking every other week and tolerating well without side effects. Upon presentation he appears much improved physically since last visit and admits to feeling much better. He does still have some joint pain to his wrist, left ankle with some "popping" and his right knee, most likely right knee pain from previous history of septic arthritis as a child. He is not having any red/warm/swollen joints at this time. Morning stiffness <5 minutes. Overall he had improvement with current dosing of medication. He has also been discharged from " "home PT within the last 2-3 weeks as he has had significant improvement.   He has been off of Prednisone for the past week, was having significant hunger on medication so stopped.     Today he presents for follow up, he is tearful as he has continued to have pain and is frustrated as he is having to use a rolator for support due to feet hurting him. He reports continuing Humria with MTX as previous directed. Last dose of Humira was 2 weeks ago, continued the MTX as directed along with 1 mg of folic acid. He did have to hold Humira x 1 dose due to sinus infection and fever but has resumed- he is due for his next dose tomorrow. Uses Naproxen prn.   Since his last visit he has continued to have severe pain, he states at night he has been having a difficult time sleeping due to pain, he has also been having restless leg syndrome- admits between the pain and the restless leg syndrome he has not been sleeping well. He has been having bilateral foot pain R>L along with back pain- he has had surgery  in September of 2022- followed by Dr. 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 He does report some numbness to his pinky toes at times, comes and goes. He also reports occ pain to his elbows and occ wrist, feels like his wrist "fall asleep" when he holds his phone and will have to shake his hands to "wake them up". He has been using a rolator for the past month due to bilateral foot pain. He did present to the ER in March due to bilateral foot pain.   He denies missing any doses of Humira with exception of illness as mentioned above. Did help for several months (started on December 2022 to March 2023)- then symptoms returned. He does report having one foot "flatter than the other" but cannot afford the orthotics recommended at this time. He sees Dr. Garvin (podiatrist) in [place]. He denies any red/warm/swollen " joints. Morning stiffness lasting for 2 hours, once he is up and moving does feel better, but pain never goes away completely.   Pain to back is worse when walking and standing on feet. Back pain at night, states has a hard time falling asleep due to pain and having a hard time getting comfortable, when he does fall asleep, very short lived due to pain waking him up. He tries using pillows to help, denies getting up to move around.      PMH: GERD and chronic right knee pain.  Denies fevers, rashes, oral and nasal ulcers, history of DVT or PE, history of stroke or seizures, history of MI, history of malignancies, Raynaud's phenomenon, history of inflammatory eye diseases, history of inflammatory bowel disease.  Family history of autoimmune disease: Pat Aunt with RA.   Smoking: none     Social History     Tobacco Use   Smoking Status Never   Smokeless Tobacco Never          ----------------------------  Arthritis  Other specified noninfective gastroenteritis and colitis  Syphilis, unspecified     Past Surgical History:   Procedure Laterality Date    SPINE SURGERY         Review of patient's allergies indicates:  No Known Allergies    Outpatient Medications Marked as Taking for the 5/8/23 encounter (Office Visit) with CATRACHITA Aguayo   Medication Sig Dispense Refill    ALCOHOL PREP PADS PadM SMARTSIG:Pledget(s) Topical      PROTONIX 40 mg tablet Take 40 mg by mouth once daily.      SHARPS CONTAINER use as directed      [DISCONTINUED] adalimumab (HUMIRA,CF, PEN) 40 mg/0.4 mL PnKt Inject 0.4 mLs (40 mg total) into the skin every 14 (fourteen) days. 2 pen 2    [DISCONTINUED] folic acid (FOLVITE) 1 MG tablet Take 1 tablet (1 mg total) by mouth once daily. 30 tablet 6    [DISCONTINUED] methotrexate 2.5 MG Tab Take 4 tablets on Friday morning and 4 tablets on Friday night, once a week. Hold for infection or fever. 40 tablet 3       Social History     Socioeconomic History    Marital status: Single   Tobacco  Use    Smoking status: Never    Smokeless tobacco: Never   Substance and Sexual Activity    Alcohol use: Not Currently    Drug use: Never    Sexual activity: Not Currently     Partners: Male        Family History   Problem Relation Age of Onset    Cancer Father     Rheum arthritis Paternal Aunt     Rheum arthritis Paternal Grandmother         Immunization History   Administered Date(s) Administered    DT (Pediatric) 12/17/1984, 09/30/1994    DTP 1980, 1980, 1980, 10/22/1981    MMR 06/25/1981    OPV 1980, 1980, 1980, 10/22/1981       Patient Care Team:  Prisca Contreras MD as PCP - General (Internal Medicine)     Subjective:     ROS    Constitutional:  Denies chills. Denies fever. Denies night sweats. Denies weight loss.   Ophthalmology: Denies blurred vision. Denies dry eyes. Denies eye pain. Denies Itching and redness.   ENT: Denies oral ulcers. Denies epistaxis. Denies dry mouth. Denies swollen glands.   Endocrine: Denies diabetes. Denies thyroid Problems.   Respiratory: Denies cough. Denies shortness of breath. Denies shortness of breath with exertion. Denies hemoptysis.   Cardiovascular: Denies chest pain at rest. Denies chest pain with exertion. Denies palpitations.  Admits to feeling his heart rate when increased, no other symptoms.   Gastrointestinal: Denies abdominal pain. Denies diarrhea. Denies nausea. Denies vomiting. Denies hematemesis or hematochezia. Denies heartburn.  Genitourinary: Denies blood in urine.  Musculoskeletal: See HPI for details  Integumentary: Denies rash. Denies photosensitivity.   Peripheral Vascular: Denies Ulcers of hands and/or feet. Denies Cold extremities.   Neurologic: Denies dizziness. Denies headache.  Denies loss of strength. Denies numbness or tingling.   Psychiatric: Admits depression and anxiety- has been dealing with, admits to feeling sad from the hurting he has been going through- has deferred meds in the past, states has found ways  "to cope by keeping busy and has been tolerable. Denies suicidal/homicidal ideations.      Objective:     /73 (BP Location: Right arm, Patient Position: Sitting, BP Method: Large (Automatic))   Pulse 100   Temp 98.7 °F (37.1 °C) (Oral)   Resp 20   Ht 5' 9" (1.753 m)   Wt 85.7 kg (189 lb)   SpO2 99%   BMI 27.91 kg/m²     Physical Exam    General Appearance: alert, pleasant, in no acute distress.  Skin: Skin color, texture, turgor normal. No rashes or lesions.  Eyes:  extraocular movement intact (EOMI), pupils equal, round, reactive to light and accommodation, conjunctiva clear.  ENT: No oral or nasal ulcers.  Neck:  Neck supple. No adenopathy.   Lungs: CTA throughout without crackles, rhonchi, or wheezes.   Heart: RRR w/o murmurs.  No edema.   Neuro: Alert, oriented, CN II-XII GI, sensory and motor innervation intact.  Musculoskeletal: Slight swelling to right knee with effusion, no warmth, left knee ok, + crepitus noted bilaterally. Limited ROM to left wrist, mild, normal exam on right. No tenderness in MCPs or PIPs, No active synovitis on exam.   Synovitis in bilateral ankles, left wrist, hand and right knee has resolved. No pain to bilateral MTPs- report pain to right great toe, no swelling/warm/erythema noted, pes planus noted to bilateral feet with R>L.   Psych: Alert, oriented, normal eye contact.    Labs:     Lab Results   Component Value Date    WBC 7.7 03/27/2023    HGB 12.3 (L) 03/27/2023    HCT 35.9 (L) 03/27/2023     03/27/2023    ALT 12 03/27/2023    AST 15 03/27/2023    BUN 16.0 03/27/2023    CREATININE 1.06 03/27/2023     03/27/2023    K 3.9 03/27/2023    CO2 26 03/27/2023    .70 (H) 03/27/2023    SEDRATE 21 (H) 03/27/2023 11/22/2022: Hep B and C negative. HIV negative. Uric acid normal. ANCA negative. HLAB 27 positive. RPR and syphilis ab positive.  Chlamydia and gonorrhea PCR negative.  BASIL negative.  Rheumatoid factor and anti CCP negative.  EBV IgM negative, IgG " positive.  Parvovirus B19 not detected.  EBV DNA not detected. Blood culture negative.   2022:  WBC count 17.7.  Hemoglobin 13.4.  Platelet count elevated 447.  ESR elevated 128, normal less than 15.  Calcium slightly elevated 10.8.  Alk phos 172.  CMP okay.  ALT upper limit of normal.  CRP elevated 131.  23:  CBC, CMP okay.  ESR, CRP normal. Quantiferon tb negative.   3/27/23: CBC ok. ESR 21, . CMP ok.   23:  1+ protein and no blood in urine.  Upc 100 milligram/gram.  Urine chlamydia gonorrhea PCR negative.    Imagin22:  X-ray of left wrist showed possible small avulsion injury at the distal tip of the ulnar styloid process, no erosions.  X-ray of left ankle showed soft tissue swelling on lateral side.  Normal x-ray of right ankle.  Normal x-ray of right knee.  23:  Normal chest x-ray.  X-ray of right hand showed mild DJD.  X-ray of left hand showed DJD.    2022:  Echocardiogram showed EF 64%, normal ejection fraction.  Normal right ventricular function.  Normal left ventricular systolic and diastolic function.    Assessment:       ICD-10-CM ICD-9-CM   1. HLA-B27 positive arthropathy  M12.80 716.80   2. Rheumatoid arthritis, seronegative, multiple sites  M06.09 714.0   3. High risk medication use  Z79.899 V58.69   4. Chronic midline low back pain with bilateral sciatica  M54.41 724.2    M54.42 724.3    G89.29 338.29            Plan:     1. HLA-B27 positive arthropathy: P/w synovitis of left right, right knee and bilateral ankles.   -Discussed course and treatment options of spondyloarthropathy with the patient.    - Negative RF and anti CCP. BASIL negative. HLA B27 Positive. Work up negative for EBV, Parvovirus, Chlamydia and Gonorrhea and blood cultures negative. No rashes, ECHO normal with no vegetations. Hep panel, HIV negative. Started on MTX in 2022, Humira added in 2022- worked well until 2023 then pain returned to bilateral feet and  back.   - No active synovitis on exam today. Limited ROM of left wrist, repeat X-ray Jan 2023 showed showed DJD.  -Continue  MTX 20 mg po qweek and folic acid 1 mg daily.   -D/C Humira as no longer effective.   -Start Taltz 160 mg subq loading dose x 1 then 4 weeks later 80 mg subq every 4 weeks- continuation. Discussed risk and benefits of Taltz with patient. Taltz is a Intrerleukin -17 ([place]-17) Antagonists. It is used to treat , Psoriasis, Psoriatic Arthritis, and Ankylosing Spondylitis. Severe adverse reactions include: infection, anaphylaxis, hypersensitivity reaction, and inflammatory bowel disease. More common side effects are: injection site reactions, nasopharyngitis, URI, or diarrhea.  -Lab today including uric acid to r/o gout     2. Rheumatoid arthritis, seronegative, multiple sites   -See above     3. High risk medication use   -Advised to stay up-to-date on age appropriate vaccinations and malignancy screening with PCP.   -Persons with rheumatoid arthritis, lupus, psoriatic arthritis and other autoimmune diseases are at increased risk of cardiovascular disease including heart attack and stroke. We recommend that all patients with these conditions have annual health maintenance exams including lipid measurements, blood pressure measurements, and smoking cessation counseling when applicable at their primary care provider's office.   -Continued lab monitoring.   -He also mentioned starting to drink a tea his aunt with RA recommended- enc to find out ingredients and bring to his pharmacy to have reviewed as we do not know what herbs are in the tea and can interact with certain meds- he reports his aunt has educated herself on this supplement and he trusts her. Also discussed tachycardia- could possibly be related to herb in tea vs pain- he does plan to follow up with his PCP for referral to cardiology. Enc to monitor any caffeine intake as well- pulse rechecked and noted at 100, improved.      4. Chronic  pain of right knee and secondary DJD:   -Secondary to infection he had as a kid and secondary arthritis.      5. History of Syphilis  -[facility] with [facility].      6. 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 Unsure procedure- on September 2022 with Dr. Burgos- has continued to follow up  -Offered PT- will consider and find place close to his home- he will keep us posted when he wishes to start.      7. Pes Planus  -Bilateral feet with R>L- currently followed by Dr. Robb in [place] (podiatry)- recommended orthotics however cannot afford at this time. May be contributing to some of his foot pain.          Follow up in about 6 weeks (around 6/19/2023) for NP Follow Up. In addition to their scheduled follow up, the patient has also been instructed to follow up on as needed basis.        Total time spent with patient and documentation is more than 30 minutes. All questions were answered to patient's satisfaction and patient verbalized understanding.

## 2023-05-08 ENCOUNTER — HOSPITAL ENCOUNTER (OUTPATIENT)
Dept: RADIOLOGY | Facility: HOSPITAL | Age: 43
Discharge: HOME | End: 2023-05-08
Attending: NURSE PRACTITIONER
Payer: MEDICARE

## 2023-05-08 ENCOUNTER — TELEPHONE (OUTPATIENT)
Dept: RHEUMATOLOGY | Facility: CLINIC | Age: 43
End: 2023-05-08

## 2023-05-08 ENCOUNTER — OFFICE VISIT (OUTPATIENT)
Dept: RHEUMATOLOGY | Facility: CLINIC | Age: 43
End: 2023-05-08
Payer: MEDICARE

## 2023-05-08 VITALS
RESPIRATION RATE: 20 BRPM | DIASTOLIC BLOOD PRESSURE: 73 MMHG | WEIGHT: 189 LBS | TEMPERATURE: 99 F | BODY MASS INDEX: 27.99 KG/M2 | HEART RATE: 100 BPM | SYSTOLIC BLOOD PRESSURE: 108 MMHG | HEIGHT: 69 IN | OXYGEN SATURATION: 99 %

## 2023-05-08 DIAGNOSIS — M06.09 RHEUMATOID ARTHRITIS, SERONEGATIVE, MULTIPLE SITES: ICD-10-CM

## 2023-05-08 DIAGNOSIS — G89.29 CHRONIC MIDLINE LOW BACK PAIN WITH BILATERAL SCIATICA: ICD-10-CM

## 2023-05-08 DIAGNOSIS — M54.41 CHRONIC MIDLINE LOW BACK PAIN WITH BILATERAL SCIATICA: ICD-10-CM

## 2023-05-08 DIAGNOSIS — Z79.899 HIGH RISK MEDICATION USE: ICD-10-CM

## 2023-05-08 DIAGNOSIS — M12.80 HLA-B27 POSITIVE ARTHROPATHY: ICD-10-CM

## 2023-05-08 DIAGNOSIS — M54.42 CHRONIC MIDLINE LOW BACK PAIN WITH BILATERAL SCIATICA: ICD-10-CM

## 2023-05-08 DIAGNOSIS — M21.42 PES PLANUS OF BOTH FEET: ICD-10-CM

## 2023-05-08 DIAGNOSIS — M21.41 PES PLANUS OF BOTH FEET: ICD-10-CM

## 2023-05-08 DIAGNOSIS — M12.80 HLA-B27 POSITIVE ARTHROPATHY: Primary | ICD-10-CM

## 2023-05-08 PROBLEM — M21.40 FLAT FOOT: Status: ACTIVE | Noted: 2023-05-08

## 2023-05-08 PROCEDURE — 3074F PR MOST RECENT SYSTOLIC BLOOD PRESSURE < 130 MM HG: ICD-10-PCS | Mod: CPTII,,, | Performed by: NURSE PRACTITIONER

## 2023-05-08 PROCEDURE — 3078F PR MOST RECENT DIASTOLIC BLOOD PRESSURE < 80 MM HG: ICD-10-PCS | Mod: CPTII,,, | Performed by: NURSE PRACTITIONER

## 2023-05-08 PROCEDURE — 3078F DIAST BP <80 MM HG: CPT | Mod: CPTII,,, | Performed by: NURSE PRACTITIONER

## 2023-05-08 PROCEDURE — 1160F RVW MEDS BY RX/DR IN RCRD: CPT | Mod: CPTII,,, | Performed by: NURSE PRACTITIONER

## 2023-05-08 PROCEDURE — 1159F MED LIST DOCD IN RCRD: CPT | Mod: CPTII,,, | Performed by: NURSE PRACTITIONER

## 2023-05-08 PROCEDURE — 1160F PR REVIEW ALL MEDS BY PRESCRIBER/CLIN PHARMACIST DOCUMENTED: ICD-10-PCS | Mod: CPTII,,, | Performed by: NURSE PRACTITIONER

## 2023-05-08 PROCEDURE — 72200 X-RAY EXAM SI JOINTS: CPT | Mod: TC

## 2023-05-08 PROCEDURE — 3008F PR BODY MASS INDEX (BMI) DOCUMENTED: ICD-10-PCS | Mod: CPTII,,, | Performed by: NURSE PRACTITIONER

## 2023-05-08 PROCEDURE — 99214 OFFICE O/P EST MOD 30 MIN: CPT | Mod: PBBFAC | Performed by: NURSE PRACTITIONER

## 2023-05-08 PROCEDURE — 3074F SYST BP LT 130 MM HG: CPT | Mod: CPTII,,, | Performed by: NURSE PRACTITIONER

## 2023-05-08 PROCEDURE — 1159F PR MEDICATION LIST DOCUMENTED IN MEDICAL RECORD: ICD-10-PCS | Mod: CPTII,,, | Performed by: NURSE PRACTITIONER

## 2023-05-08 PROCEDURE — 99215 PR OFFICE/OUTPT VISIT, EST, LEVL V, 40-54 MIN: ICD-10-PCS | Mod: S$PBB,,, | Performed by: NURSE PRACTITIONER

## 2023-05-08 PROCEDURE — 3008F BODY MASS INDEX DOCD: CPT | Mod: CPTII,,, | Performed by: NURSE PRACTITIONER

## 2023-05-08 PROCEDURE — 99215 OFFICE O/P EST HI 40 MIN: CPT | Mod: S$PBB,,, | Performed by: NURSE PRACTITIONER

## 2023-05-08 RX ORDER — METHOTREXATE 2.5 MG/1
TABLET ORAL
Qty: 40 TABLET | Refills: 3 | Status: SHIPPED | OUTPATIENT
Start: 2023-05-08 | End: 2023-07-11 | Stop reason: SDUPTHER

## 2023-05-08 RX ORDER — FOLIC ACID 1 MG/1
1 TABLET ORAL DAILY
Qty: 30 TABLET | Refills: 2 | Status: SHIPPED | OUTPATIENT
Start: 2023-05-08 | End: 2023-07-11 | Stop reason: SDUPTHER

## 2023-05-08 RX ORDER — IXEKIZUMAB 80 MG/ML
INJECTION, SOLUTION SUBCUTANEOUS
Qty: 2 EACH | Refills: 1 | Status: SHIPPED | OUTPATIENT
Start: 2023-05-08 | End: 2023-05-08

## 2023-05-08 RX ORDER — IXEKIZUMAB 80 MG/ML
INJECTION, SOLUTION SUBCUTANEOUS
Qty: 2 EACH | Refills: 1 | Status: SHIPPED | OUTPATIENT
Start: 2023-05-08 | End: 2023-05-15 | Stop reason: CLARIF

## 2023-05-08 NOTE — TELEPHONE ENCOUNTER
"I spoke with the patient at length in regards to lab, clinically acceptable at this time, slight increase in creat, we will continue to monitor noted at 1.22 (normal less than 1.18), encouraged him to hydrate well with H2O.  Inflammatory markers have decreased, uric acid noted at 7.  We had an at length discussion in regards to low purine diet, he did place his mother on speaker so she could hear recommended diet restrictions, I advised them to get online and search low purine diet for further guidance.  According to their daily intake, his mother states they "live on"  rice and gravy daily.  I advised them that may have to decrease this intake.  Taltz script was sent to [facility] pharmacy, they would like this medication to be transferred to their local pharmacy if possible once a PA has been completed, they will reach out to outpatient pharmacy once approval of medication to see if this can be transferred over.  All questions answered at the time of our conversation, I encouraged him to reach out for any further questions or concerns. He did ask about the restless leg syndrome he mentioned earlier at today's apt, I encouraged him to reach out to his PCP to discuss symptoms of restless leg further, he states he did call to make a sooner appointment with her to discuss, he states he will also mention his increase in heart rate to her as well, I agreed and also advised to report to ER for any changes or concerns that may develop.   "

## 2023-05-10 ENCOUNTER — PATIENT MESSAGE (OUTPATIENT)
Dept: RHEUMATOLOGY | Facility: CLINIC | Age: 43
End: 2023-05-10
Payer: MEDICARE

## 2023-05-11 ENCOUNTER — TELEPHONE (OUTPATIENT)
Dept: RHEUMATOLOGY | Facility: CLINIC | Age: 43
End: 2023-05-11
Payer: MEDICARE

## 2023-05-11 NOTE — TELEPHONE ENCOUNTER
Attempted to reach out to Trey to notify change in medication as insurance denied Taltz, no answer, left voicemail to return call.

## 2023-05-12 ENCOUNTER — TELEPHONE (OUTPATIENT)
Dept: RHEUMATOLOGY | Facility: CLINIC | Age: 43
End: 2023-05-12
Payer: MEDICARE

## 2023-05-12 NOTE — TELEPHONE ENCOUNTER
Attempted to reach patient again in regards to med change as insurance denied Taltz, no answer, left voicemail to return call.

## 2023-05-14 ENCOUNTER — PATIENT MESSAGE (OUTPATIENT)
Dept: RHEUMATOLOGY | Facility: CLINIC | Age: 43
End: 2023-05-14
Payer: MEDICARE

## 2023-05-15 ENCOUNTER — TELEPHONE (OUTPATIENT)
Dept: RHEUMATOLOGY | Facility: CLINIC | Age: 43
End: 2023-05-15
Payer: MEDICARE

## 2023-05-15 RX ORDER — SECUKINUMAB 150 MG/ML
INJECTION SUBCUTANEOUS
Qty: 10 EACH | Refills: 5 | Status: SHIPPED | OUTPATIENT
Start: 2023-05-15 | End: 2023-05-17 | Stop reason: SDUPTHER

## 2023-05-15 NOTE — TELEPHONE ENCOUNTER
I spoke with the patient in regards to change in medication- Taltz was not approved with insurance and recommended Cosentyx so we will proceed with Cosentyx, same classification of medication (biologic IL 17 inhibitor) discussed loading dose of 300mg subq at week 0, 1, 2, 3, 4 then at week 8 the will take 300 mg subq once per month- all questions answered and advised him to call for any questions or concerns.    Ansley, just ordered new Rx of Cosentyx, please keep me posted, thank you!

## 2023-05-17 ENCOUNTER — PATIENT MESSAGE (OUTPATIENT)
Dept: RHEUMATOLOGY | Facility: CLINIC | Age: 43
End: 2023-05-17
Payer: MEDICARE

## 2023-05-17 ENCOUNTER — TELEPHONE (OUTPATIENT)
Dept: GASTROENTEROLOGY | Facility: CLINIC | Age: 43
End: 2023-05-17
Payer: MEDICARE

## 2023-05-17 DIAGNOSIS — R65.10 SIRS (SYSTEMIC INFLAMMATORY RESPONSE SYNDROME): ICD-10-CM

## 2023-05-17 DIAGNOSIS — M25.50 POLYARTHRALGIA: ICD-10-CM

## 2023-05-17 DIAGNOSIS — M12.80 HLA-B27 POSITIVE ARTHROPATHY: ICD-10-CM

## 2023-05-17 DIAGNOSIS — M25.532 ACUTE PAIN OF LEFT WRIST: ICD-10-CM

## 2023-05-17 DIAGNOSIS — M06.09 RHEUMATOID ARTHRITIS, SERONEGATIVE, MULTIPLE SITES: Primary | ICD-10-CM

## 2023-05-17 RX ORDER — SECUKINUMAB 150 MG/ML
INJECTION SUBCUTANEOUS
Qty: 10 ML | Refills: 0 | Status: ACTIVE | OUTPATIENT
Start: 2023-05-17 | End: 2023-07-11

## 2023-05-17 NOTE — TELEPHONE ENCOUNTER
I called Mom to inform her that we have to send the RX over to [facility].  Someone will reach out to them for delivery. I went over all the instructions with her for administering meds.  I will go over them again before PT starts.  Asked that she contact us once medication has arrived.      I also informed Mother that Trey really needs to be patient with us as we are trying our best to care for him. It is inappropriate to yell and hang up on us every phone call we have with him. She was very agreeable and stated he is like that with her as well at home.  She also has told him he needs to stop acting like that with us.    Will check up with Pharmacy tomorrow on RX.

## 2023-05-17 NOTE — TELEPHONE ENCOUNTER
----- Message from Allyn Stewart sent at 5/17/2023 10:47 AM CDT -----  Pt called stating that the medication Taltz 80 mg he is unable to fill, he is stating that [facility] says it is a specialty drug and they can't fill it . Please advise   Pt # [phone]

## 2023-05-17 NOTE — TELEPHONE ENCOUNTER
"Called PT.  Reclarified with him that he is not going to be given the Taltz due to insurance coverage.  Medication has been changed to Cosentyx Pen and had been approved.  PT very angry and stated "How many da,nm medicines will you try before fixing me" then hung up.  I called back and his Mom answered.  I explained everything to mother.  She understood what I was saying and asked that I call her back with an update.    I called the pharmacy to check on status.  No answer.  Will try again today.  "

## 2023-05-18 ENCOUNTER — PATIENT MESSAGE (OUTPATIENT)
Dept: PHARMACY | Facility: CLINIC | Age: 43
End: 2023-05-18
Payer: MEDICARE

## 2023-05-18 ENCOUNTER — SPECIALTY PHARMACY (OUTPATIENT)
Dept: PHARMACY | Facility: CLINIC | Age: 43
End: 2023-05-18
Payer: MEDICARE

## 2023-05-18 ENCOUNTER — TELEPHONE (OUTPATIENT)
Dept: PHARMACY | Facility: CLINIC | Age: 43
End: 2023-05-18
Payer: MEDICARE

## 2023-05-18 DIAGNOSIS — M12.80 HLA-B27 POSITIVE ARTHROPATHY: Primary | ICD-10-CM

## 2023-05-18 NOTE — TELEPHONE ENCOUNTER
[facility] and spoke to Sy.  Medication received and is in a queue for filling.  He will let the techs know that PT is in pain and needs medication as soon as they can.

## 2023-05-18 NOTE — TELEPHONE ENCOUNTER
Incoming call from Farzaneh at providers office inquiring if [facility] received patient's Cosentyx.  Informed her we did.  Farzaneh stated that patient is in pain and is constantly calling providers office.  Farzaneh stated this is not a continuation of therapy for patient.    Farzaneh stated that the PA for Cosentyx is already approved.  Approval noted in telephone encounter from 05/15/23.  Informed her that the team will be alerted of this.

## 2023-05-18 NOTE — TELEPHONE ENCOUNTER
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 male, who is followed by the specialty pharmacy service for management and education.    Recent Encounters       Date Type Provider Description    05/18/2023 Specialty Pharmacy Rosa Maria Araujo PharmD Initial Clinical Assessment    05/18/2023 Specialty Pharmacy Rosa Maria Araujo PharmD Referral Authorization            Current Outpatient Medications   Medication Sig    ALCOHOL PREP PADS PadM SMARTSIG:Pledget(s) Topical    folic acid (FOLVITE) 1 MG tablet Take 1 tablet (1 mg total) by mouth once daily.    methotrexate 2.5 MG Tab Take 4 tablets on Friday morning and 4 tablets on Friday night, once a week. Hold for infection or fever.    PROTONIX 40 mg tablet Take 40 mg by mouth once daily.    secukinumab (COSENTYX PEN, 2 PENS,) 150 mg/mL PnIj Inject 2 mL (300 mg) into the skin once a week at weeks 0, 1, 2, 3 and 4 (loading dose). Hold for any fever or infections.    secukinumab (COSENTYX PEN, 2 PENS,) 150 mg/mL PnIj Inject 2 mL( 150 mg) into the skin every 30 days. Hold for any fever or infection.    SHARPS CONTAINER use as directed   Last reviewed on 5/18/2023  2:02 PM by Rosa Maria Araujo PharmD    Review of patient's allergies indicates:  No Known AllergiesLast reviewed on  5/15/2023 8:11 AM by Angeles Hernandez          Assessment Questions - Documented Responses      Flowsheet Row Most Recent Value   Assessment    Medication Reconciliation completed for patient No   During the past 4 weeks, has patient missed any activities due to condition or medication? Missed Planned Activities    During the past 4 weeks, did patient have any of the following urgent care visits? None   Goals of Therapy Status Discussed (new start)   Status of the patients ability to self-administer: Is Able   All education points have been covered with patient? Yes, supplemental printed education provided   Welcome packet contents reviewed and discussed with patient? Yes   Assesment completed? Yes   Plan Therapy being initiated   Do you need to open a clinical intervention (i-vent)? No   Do you want to schedule first shipment? Yes   Medication #1 Assessment Info    Patient status New medication, New to [facility]   Is this medication appropriate for the patient? Yes   Is this medication effective? Not yet started          Refill Questions - Documented Responses      Flowsheet Row Most Recent Value   Patient Availability and HIPAA Verification    Does patient want to proceed with activity? Yes   HIPAA/medical authority confirmed? Yes   Relationship to patient of person spoken to? Self   Refill Screening Questions    When does the patient need to receive the medication? 05/19/23   Refill Delivery Questions    How will the patient receive the medication? Mail   When does the patient need to receive the medication? 05/19/23   Shipping Address Home   Address in [facility] confirmed and updated if neccessary? Yes   Expected Copay ($) 0   Is the patient able to afford the medication copay? Yes   Payment Method zero copay   Days supply of Refill 35   Supplies needed? No supplies needed   Refill activity completed? Yes   Refill activity plan Refill scheduled   Shipment/Pickup Date: 05/18/23            Objective    He has a past medical history of Arthritis, Other specified noninfective gastroenteritis and colitis, and Syphilis, unspecified.    Tried/failed medications: Humira, MTX    BP Readings from Last 4 Encounters:   05/08/23 108/73   04/17/23 (!) 91/55   03/27/23 112/72   01/17/23 (!) 133/94     Ht Readings from Last 4  "Encounters:   05/08/23 5' 9" (1.753 m)   04/17/23 5' 9" (1.753 m)   03/27/23 5' 9" (1.753 m)   01/17/23 5' 9" (1.753 m)     Wt Readings from Last 4 Encounters:   05/08/23 85.7 kg (189 lb)   04/17/23 85.7 kg (189 lb)   03/27/23 88.7 kg (195 lb 8.8 oz)   01/17/23 90.3 kg (199 lb)       The goals of prescribed drug therapy management include:  Supporting patient to meet the prescriber's medical treatment objectives  Improving or maintaining quality of life  Maintaining optimal therapy adherence  Minimizing and managing side effects      Goals of Therapy Status: Discussed (new start)    Assessment/Plan  Patient plans to start therapy on 05/19/23      Indication, dosage, appropriateness, effectiveness, safety and convenience of his specialty medication(s) were reviewed today.     Patient Education   Patient received education on the following:   Expectations and possible outcomes of therapy  Proper use, timely administration, and missed dose management  Duration of therapy  Side effects, including prevention, minimization, and management  Contraindications and safety precautions  New or changed medications, including prescribe and over the counter medications and supplements  Reviews recommended vaccinations, as appropriate  Storage, safe handling, and disposal    Switch from Humira. Pt has a lot of pain in feet, Walks with 2 canes.    Tasks added this encounter   No tasks added.   Tasks due within next 3 months   No tasks due.     Rosa Maria Araujo, PharmD  [facility]  [address]  [facility] [address]  Phone: [phone]  Fax: [phone]  "

## 2023-05-24 ENCOUNTER — TELEPHONE (OUTPATIENT)
Dept: RHEUMATOLOGY | Facility: CLINIC | Age: 43
End: 2023-05-24
Payer: MEDICARE

## 2023-05-29 ENCOUNTER — PATIENT MESSAGE (OUTPATIENT)
Dept: GASTROENTEROLOGY | Facility: CLINIC | Age: 43
End: 2023-05-29
Payer: MEDICARE

## 2023-06-16 ENCOUNTER — SPECIALTY PHARMACY (OUTPATIENT)
Dept: PHARMACY | Facility: CLINIC | Age: 43
End: 2023-06-16
Payer: MEDICARE

## 2023-06-16 NOTE — TELEPHONE ENCOUNTER
Incoming call: Patient called for claifiication on the loading dose. He has misplaced the box with direction and wasn't sure what to do next. Counseled him on the loading dose from Wk 0 to Wk 4 (35days supply) then Monthly dose afterwards. Pt state he just had his forth dose and will be taking his last dose next week, completing the loading dose. Informed him that [facility] will reach out to him and schedule the refill maintenance dose refill accordingly. Pt voiced understanding.

## 2023-07-07 NOTE — PROGRESS NOTES
"    Patient ID: [id number]     Chief Complaint: HLA-B27 positive arthropathy (Pt stated denies any pain at this time)    HPI:     Trey Harper is a 43 y.o. male here today for follow up of spondyloarthritis, +HLA-B27.      History of +HLA-B27 diagnosed in 2022 after presentation to ED on 11/22/2022 with c/o of pain and swelling in left wrist, left hand, bilateral ankles and right knee. Started with pain and swelling in left wrist and progress to bilateral ankles and right knee.  Denies fevers or recent travel.  History of bloody diarrhea in second week of 11/2022 in which he presented to ER for eval.  He was treated with ciprofloxacin and Flagyl at the time.  Diarrhea resolved.  Denies history of rashes, oral or nasal or genital ulcers. This is the first episode he had swelling and pain in multiple joints, no similar issues in the past- he was admitted to the hospital for eval.  History of septic arthritis in right knee when he was 5 to 6 years old. 11/22/2022 HLA-B27 + during hospital stay.   He previously tested positive for syphilis and was treated with penicillin in 9/2022.     He is currently taking 15 mg of MTX every Friday and tolerating well with folic acid. Also taking Humira 40 mg SQ every other week. Tolerating it well but has continued to have pain. He was started on Humira in December of 2022, taking every other week and tolerating well without side effects. Upon presentation he appears much improved physically since last visit and admits to feeling much better. He does still have some joint pain to his wrist, left ankle with some "popping" and his right knee, most likely right knee pain from previous history of septic arthritis as a child. He is not having any red/warm/swollen joints at this time. Morning stiffness <5 minutes. Overall he had improvement with current dosing of medication. He has also been discharged from home PT within the last 2-3 weeks as he has had significant improvement.   He " "has been off of Prednisone for the past week, was having significant hunger on medication so stopped.     April 2023:  he presents for follow up, he is tearful as he has continued to have pain and is frustrated as he is having to use a rolator for support due to feet hurting him. He reports continuing Humria with MTX as previous directed. Last dose of Humira was 2 weeks ago, continued the MTX as directed along with 1 mg of folic acid. He did have to hold Humira x 1 dose due to sinus infection and fever but has resumed- he is due for his next dose tomorrow. Uses Naproxen prn.   Since his last visit he has continued to have severe pain, he states at night he has been having a difficult time sleeping due to pain, he has also been having restless leg syndrome- admits between the pain and the restless leg syndrome he has not been sleeping well. He has been having bilateral foot pain R>L along with back pain- he has had surgery  in September of 2022- followed by Dr. 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 He does report some numbness to his pinky toes at times, comes and goes. He also reports occ pain to his elbows and occ wrist, feels like his wrist "fall asleep" when he holds his phone and will have to shake his hands to "wake them up". He has been using a rolator for the past month due to bilateral foot pain. He did present to the ER in March due to bilateral foot pain.   He denies missing any doses of Humira with exception of illness as mentioned above. Did help for several months (started on December 2022 to March 2023)- then symptoms returned. He does report having one foot "flatter than the other" but cannot afford the orthotics recommended at this time. He sees Dr. Garvin (podiatrist) in [place]. He denies any red/warm/swollen joints. Morning stiffness lasting for 2 hours, once he is up and moving " "does feel better, but pain never goes away completely.   Pain to back is worse when walking and standing on feet. Back pain at night, states has a hard time falling asleep due to pain and having a hard time getting comfortable, when he does fall asleep, very short lived due to pain waking him up. He tries using pillows to help, denies getting up to move around.     July 2023: Here today for follow up since starting Cosentyx. He started meds around beginning of June and has completed loading dose and overall has noticed improvement in joints. He does still have some pain to right ankle/foot when he "mis-steps", otherwise no issues. He has continued his MTX 8 tab po qweek with folic acid 1 mg daily. He has also started Allopurinol 100 mg with his PCP for treatment of gout, he states he has had some pain in his right great toe at times, has a f/u coming up but not sure when. Overall he is feeling better and has returned to his hobby of traveling for [place]. Tolerating current meds well without any s/e.   States tachycardia has also resolved, feels like it was related to his stress level, now that he is feeling better/pain controlled, he states he has not had any issues.     Denies any recent illness or hospitalizations since last visit.     PMH: GERD and chronic right knee pain.  Denies fevers, rashes, oral and nasal ulcers, history of DVT or PE, history of stroke or seizures, history of MI, history of malignancies, Raynaud's phenomenon, history of inflammatory eye diseases, history of inflammatory bowel disease.  Family history of autoimmune disease: Pat Aunt with RA.   Smoking: none     Social History     Tobacco Use   Smoking Status Never   Smokeless Tobacco Never          ----------------------------  Arthritis  Other specified noninfective gastroenteritis and colitis  Syphilis, unspecified     Past Surgical History:   Procedure Laterality Date    SPINE SURGERY         Review of patient's allergies " indicates:  No Known Allergies    Outpatient Medications Marked as Taking for the 7/11/23 encounter (Office Visit) with CATRACHITA Aguayo   Medication Sig Dispense Refill    ALCOHOL PREP PADS PadM SMARTSIG:Pledget(s) Topical      allopurinoL (ZYLOPRIM) 100 MG tablet Take 100 mg by mouth.      PROTONIX 40 mg tablet Take 40 mg by mouth once daily.      SHARPS CONTAINER use as directed      [DISCONTINUED] folic acid (FOLVITE) 1 MG tablet Take 1 tablet (1 mg total) by mouth once daily. 30 tablet 2    [DISCONTINUED] methotrexate 2.5 MG Tab Take 4 tablets on Friday morning and 4 tablets on Friday night, once a week. Hold for infection or fever. 40 tablet 3    [DISCONTINUED] secukinumab (COSENTYX PEN, 2 PENS,) 150 mg/mL PnIj Inject 2 mL( 150 mg) into the skin every 30 days. Hold for any fever or infection. 2 mL 4       Social History     Socioeconomic History    Marital status: Single   Tobacco Use    Smoking status: Never    Smokeless tobacco: Never   Substance and Sexual Activity    Alcohol use: Not Currently    Drug use: Never    Sexual activity: Not Currently     Partners: Male        Family History   Problem Relation Age of Onset    Cancer Father     Rheum arthritis Paternal Aunt     Rheum arthritis Paternal Grandmother         Immunization History   Administered Date(s) Administered    DT (Pediatric) 12/17/1984, 09/30/1994    DTP 1980, 1980, 1980, 10/22/1981    MMR 06/25/1981    OPV 1980, 1980, 1980, 10/22/1981       Patient Care Team:  Prisca Contreras MD as PCP - General (Internal Medicine)  Cristóbal LaresD as Pharmacist (Pharmacist)     Subjective:     ROS    Constitutional:  Denies chills. Denies fever. Denies night sweats. Denies weight loss.   Ophthalmology: Denies blurred vision. Denies dry eyes. Denies eye pain. Denies Itching and redness.   ENT: Denies oral ulcers. Denies epistaxis. Denies dry mouth. Denies swollen glands.   Endocrine: Denies diabetes.  "Denies thyroid Problems.   Respiratory: Denies cough. Denies shortness of breath. Denies shortness of breath with exertion. Denies hemoptysis.   Cardiovascular: Denies chest pain at rest. Denies chest pain with exertion. Denies palpitations.  Did complain of tachycardia at last visit which he states has resolved- did not see cardio  Gastrointestinal: Denies abdominal pain. Denies diarrhea. Denies nausea. Denies vomiting. Denies hematemesis or hematochezia. Denies heartburn.  Genitourinary: Denies blood in urine.  Musculoskeletal: See HPI for details  Integumentary: Denies rash. Denies photosensitivity.   Peripheral Vascular: Denies Ulcers of hands and/or feet. Denies Cold extremities.   Neurologic: Denies dizziness. Denies headache.  Denies loss of strength. Denies numbness or tingling.   Psychiatric: Denies depression and anxiety- doing much better since he is feeling better. Denies suicidal/homicidal ideations.      Objective:     /84 (BP Location: Left arm, Patient Position: Sitting, BP Method: Medium (Automatic))   Pulse 80   Temp 98.4 °F (36.9 °C) (Oral)   Resp 20   Ht 5' 9" (1.753 m)   Wt 85.3 kg (188 lb)   SpO2 97%   BMI 27.76 kg/m²     Physical Exam    General Appearance: alert, pleasant, in no acute distress.  Skin: Skin color, texture, turgor normal. No rashes or lesions.  Eyes:  extraocular movement intact (EOMI), pupils equal, round, reactive to light and accommodation, conjunctiva clear.  ENT: No oral or nasal ulcers.  Neck:  Neck supple. No adenopathy.   Lungs: CTA throughout without crackles, rhonchi, or wheezes.   Heart: RRR w/o murmurs.  No edema.   Neuro: Alert, oriented, CN II-XII GI, sensory and motor innervation intact.  Musculoskeletal: No pain to bilateral MCPs, FROM noted to right wrist, left wrist limited, no pain, elbows and shoulders with no pain, + crepitus noted bilaterally. No pain to bilateral MTPs. Pes planus noted to bilateral feet with R>L.   Psych: Alert, oriented, " normal eye contact.    Labs:   2022: Hep B and C negative. HIV negative. Uric acid normal. ANCA negative. HLAB 27 positive. RPR and syphilis ab positive.  Chlamydia and gonorrhea PCR negative.  BASIL negative.  Rheumatoid factor and anti CCP negative.  EBV IgM negative, IgG positive.  Parvovirus B19 not detected.  EBV DNA not detected. Blood culture negative.     2022:  WBC count 17.7.  Hemoglobin 13.4.  Platelet count elevated 447.  ESR elevated 128, normal less than 15.  Calcium slightly elevated 10.8.  Alk phos 172.  CMP okay.  ALT upper limit of normal.  CRP elevated 131.    23:  CBC, CMP okay.  ESR, CRP normal. Quantiferon tb negative.     3/27/23: CBC ok. ESR 21, . CMP ok.     23:  1+ protein and no blood in urine.  Upc 100 milligram/gram.  Urine chlamydia gonorrhea PCR negative.    2023 CMP Creat 1.22 (previous 1.06). Calcium 10.6 (previous 9.8), otherwise ok. CRP 97.50 (previous 203.70, normal <5). CBC hgb 13.4 (previous 12.3). platelet 444 (previous 275), otherwise ok. Sed Rate 24 (<15). Uric Acid 7 (previous 4.9).   Lab Results   Component Value Date    WBC 11.33 2023    HGB 13.4 (L) 2023    HCT 42.1 2023     (H) 2023    ALT 13 2023    AST 14 2023    BUN 19.4 2023    CREATININE 1.22 (H) 2023     2023    K 4.6 2023    CO2 23 2023    CRP 97.50 (H) 2023    SEDRATE 24 (H) 2023        Imagin22:  X-ray of left wrist showed possible small avulsion injury at the distal tip of the ulnar styloid process, no erosions.    X-ray of left ankle showed soft tissue swelling on lateral side.    Normal x-ray of right ankle.    Normal x-ray of right knee.     2022:  Echocardiogram showed EF 64%, normal ejection fraction.  Normal right ventricular function.  Normal left ventricular systolic and diastolic function.    23:  Normal chest x-ray.    X-ray of right hand showed mild DJD.     X-ray of left hand showed DJD.    5/8/2023: SI Joint Xray: Mild SI Joint sclerosis     Assessment:       ICD-10-CM ICD-9-CM   1. HLA-B27 positive arthropathy  M12.80 716.80   2. Rheumatoid arthritis, seronegative, multiple sites  M06.09 714.0   3. High risk medication use  Z79.899 V58.69   4. Chronic pain of right knee  M25.561 719.46    G89.29 338.29   5. Positive serology for syphilis  A53.0 097.1   6. Chronic midline low back pain with bilateral sciatica  M54.41 724.2    M54.42 724.3    G89.29 338.29   7. Pes planus of both feet  M21.41 734    M21.42    8. Chronic gout of multiple sites, unspecified cause  M1A.09X0 274.02              Plan:     1. HLA-B27 positive arthropathy:  Diagnosed in 2022 after presentation to ED on 11/22/2022 with c/o of pain and swelling in left wrist, left hand, bilateral ankles and right knee. Started with pain and swelling in left wrist and progress to bilateral ankles and right knee.  Denies fevers or recent travel. History of bloody diarrhea in second week of 11/2022 in which he presented to ER for eval.  Negative RF and anti CCP. BASIL negative. HLA B27 Positive. Work up negative for EBV, Parvovirus, Chlamydia and Gonorrhea and blood cultures negative. No rashes, ECHO normal with no vegetations. Hep panel, HIV negative.  Started on MTX in November of 2022, Humira added in December of 2022- worked well until March of 2023 then pain returned to bilateral feet and back. Limited ROM of left wrist, repeat X-ray Jan 2023 showed showed DJD. Started Cosentyx June 2023 and tolerating well, also continues MTX 8 tab po qweek with folic acid 1 mg po qd.  Today on exam, no active synovitis noted.     -Continue  MTX 20 mg po qweek and folic acid 1 mg daily.   -Continue Cosentyx 150 mg subq qmonth  -Infection w/u up to date and negative  -Lab today for continued monitoring (we will also fax lab today to his PCP as he does have an upcoming apt)     2. Rheumatoid arthritis, seronegative, multiple  sites   -See above     3. High risk medication use   -Advised to stay up-to-date on age appropriate vaccinations and malignancy screening with PCP.   -Persons with rheumatoid arthritis, lupus, psoriatic arthritis and other autoimmune diseases are at increased risk of cardiovascular disease including heart attack and stroke. We recommend that all patients with these conditions have annual health maintenance exams including lipid measurements, blood pressure measurements, and smoking cessation counseling when applicable at their primary care provider's office.   -Continued lab monitoring.   -Reminded to hold meds of Cosentyx and MTX for any fever or infections, also monitor ETOH with MTX to 1-2 drinks a week.      4. Chronic pain of right knee and secondary DJD:   -Secondary to infection he had as a kid and secondary arthritis.   -Stable, no new issues at this time, still bothers him but no worsening of symptoms      5. History of Syphilis  -Treated with PCN in September [facility]22 at health unit.      6. 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 Unsure procedure- on September 2022 with Dr. Burgos- has continued to follow up  -Offered PT- will consider and find place close to his home- he will keep us posted when he wishes to start.      7. Pes Planus  -Bilateral feet with R>L- currently followed by Dr. Robb in [place] (podiatry)- recommended orthotics however cannot afford at this time. May be contributing to some of his foot pain.      8. Gout  -Currently treated by his PCP as noted Uric acid elevated in May 2023 at 7  -Continue Allopurinol 100 mg po qd with PCP as directed  -Low purine diet discussed and h/o given at check out         Follow up Has apt 9/23 with MD, please keep. In addition to their scheduled follow up, the patient has also been instructed to follow up on as needed basis.      Total time spent with patient and documentation is more than 60  minutes. All questions were answered to patient's satisfaction and patient verbalized understanding.

## 2023-07-11 ENCOUNTER — OFFICE VISIT (OUTPATIENT)
Dept: RHEUMATOLOGY | Facility: CLINIC | Age: 43
End: 2023-07-11
Payer: MEDICARE

## 2023-07-11 ENCOUNTER — SPECIALTY PHARMACY (OUTPATIENT)
Dept: PHARMACY | Facility: CLINIC | Age: 43
End: 2023-07-11
Payer: MEDICAID

## 2023-07-11 VITALS
OXYGEN SATURATION: 97 % | HEIGHT: 69 IN | TEMPERATURE: 98 F | SYSTOLIC BLOOD PRESSURE: 139 MMHG | DIASTOLIC BLOOD PRESSURE: 84 MMHG | WEIGHT: 188 LBS | BODY MASS INDEX: 27.85 KG/M2 | RESPIRATION RATE: 20 BRPM | HEART RATE: 80 BPM

## 2023-07-11 DIAGNOSIS — M54.42 CHRONIC MIDLINE LOW BACK PAIN WITH BILATERAL SCIATICA: ICD-10-CM

## 2023-07-11 DIAGNOSIS — Z79.899 HIGH RISK MEDICATION USE: ICD-10-CM

## 2023-07-11 DIAGNOSIS — M21.41 PES PLANUS OF BOTH FEET: ICD-10-CM

## 2023-07-11 DIAGNOSIS — A53.0 POSITIVE SEROLOGY FOR SYPHILIS: ICD-10-CM

## 2023-07-11 DIAGNOSIS — G89.29 CHRONIC PAIN OF RIGHT KNEE: ICD-10-CM

## 2023-07-11 DIAGNOSIS — M25.561 CHRONIC PAIN OF RIGHT KNEE: ICD-10-CM

## 2023-07-11 DIAGNOSIS — M21.42 PES PLANUS OF BOTH FEET: ICD-10-CM

## 2023-07-11 DIAGNOSIS — M06.09 RHEUMATOID ARTHRITIS, SERONEGATIVE, MULTIPLE SITES: ICD-10-CM

## 2023-07-11 DIAGNOSIS — M1A.09X0 CHRONIC GOUT OF MULTIPLE SITES, UNSPECIFIED CAUSE: ICD-10-CM

## 2023-07-11 DIAGNOSIS — M12.80 HLA-B27 POSITIVE ARTHROPATHY: Primary | ICD-10-CM

## 2023-07-11 DIAGNOSIS — M54.41 CHRONIC MIDLINE LOW BACK PAIN WITH BILATERAL SCIATICA: ICD-10-CM

## 2023-07-11 DIAGNOSIS — G89.29 CHRONIC MIDLINE LOW BACK PAIN WITH BILATERAL SCIATICA: ICD-10-CM

## 2023-07-11 PROBLEM — R00.0 TACHYCARDIA: Status: RESOLVED | Noted: 2022-11-22 | Resolved: 2023-07-11

## 2023-07-11 PROCEDURE — 1160F PR REVIEW ALL MEDS BY PRESCRIBER/CLIN PHARMACIST DOCUMENTED: ICD-10-PCS | Mod: CPTII,,, | Performed by: NURSE PRACTITIONER

## 2023-07-11 PROCEDURE — 3075F PR MOST RECENT SYSTOLIC BLOOD PRESS GE 130-139MM HG: ICD-10-PCS | Mod: CPTII,,, | Performed by: NURSE PRACTITIONER

## 2023-07-11 PROCEDURE — 3079F DIAST BP 80-89 MM HG: CPT | Mod: CPTII,,, | Performed by: NURSE PRACTITIONER

## 2023-07-11 PROCEDURE — 99214 OFFICE O/P EST MOD 30 MIN: CPT | Mod: PBBFAC | Performed by: NURSE PRACTITIONER

## 2023-07-11 PROCEDURE — 3008F BODY MASS INDEX DOCD: CPT | Mod: CPTII,,, | Performed by: NURSE PRACTITIONER

## 2023-07-11 PROCEDURE — 3075F SYST BP GE 130 - 139MM HG: CPT | Mod: CPTII,,, | Performed by: NURSE PRACTITIONER

## 2023-07-11 PROCEDURE — 3008F PR BODY MASS INDEX (BMI) DOCUMENTED: ICD-10-PCS | Mod: CPTII,,, | Performed by: NURSE PRACTITIONER

## 2023-07-11 PROCEDURE — 99214 PR OFFICE/OUTPT VISIT, EST, LEVL IV, 30-39 MIN: ICD-10-PCS | Mod: S$PBB,,, | Performed by: NURSE PRACTITIONER

## 2023-07-11 PROCEDURE — 99214 OFFICE O/P EST MOD 30 MIN: CPT | Mod: S$PBB,,, | Performed by: NURSE PRACTITIONER

## 2023-07-11 PROCEDURE — 3079F PR MOST RECENT DIASTOLIC BLOOD PRESSURE 80-89 MM HG: ICD-10-PCS | Mod: CPTII,,, | Performed by: NURSE PRACTITIONER

## 2023-07-11 PROCEDURE — 1159F MED LIST DOCD IN RCRD: CPT | Mod: CPTII,,, | Performed by: NURSE PRACTITIONER

## 2023-07-11 PROCEDURE — 1159F PR MEDICATION LIST DOCUMENTED IN MEDICAL RECORD: ICD-10-PCS | Mod: CPTII,,, | Performed by: NURSE PRACTITIONER

## 2023-07-11 PROCEDURE — 1160F RVW MEDS BY RX/DR IN RCRD: CPT | Mod: CPTII,,, | Performed by: NURSE PRACTITIONER

## 2023-07-11 RX ORDER — ALLOPURINOL 100 MG/1
100 TABLET ORAL DAILY
COMMUNITY

## 2023-07-11 RX ORDER — FOLIC ACID 1 MG/1
1 TABLET ORAL DAILY
Qty: 30 TABLET | Refills: 2 | Status: SHIPPED | OUTPATIENT
Start: 2023-07-11 | End: 2023-07-31 | Stop reason: SDUPTHER

## 2023-07-11 RX ORDER — SECUKINUMAB 150 MG/ML
150 INJECTION SUBCUTANEOUS
Qty: 2 ML | Refills: 4 | Status: ACTIVE | OUTPATIENT
Start: 2023-07-11 | End: 2023-07-19 | Stop reason: DRUGHIGH

## 2023-07-11 RX ORDER — METHOTREXATE 2.5 MG/1
TABLET ORAL
Qty: 40 TABLET | Refills: 3 | Status: SHIPPED | OUTPATIENT
Start: 2023-07-11 | End: 2023-09-13 | Stop reason: SDUPTHER

## 2023-07-11 NOTE — TELEPHONE ENCOUNTER
Pt is due for Cosentyx injection on 7/23. Too soon to schedule refill. Will call pt on 7/14 for refill.

## 2023-07-11 NOTE — TELEPHONE ENCOUNTER
Patient called and said nurse told him to reach out to [facility] regarding Cosentyx refill. It looks like the new order is still being worked on. Will route to assigned pharmacist for follow up

## 2023-07-12 ENCOUNTER — TELEPHONE (OUTPATIENT)
Dept: RHEUMATOLOGY | Facility: CLINIC | Age: 43
End: 2023-07-12
Payer: MEDICAID

## 2023-07-12 NOTE — TELEPHONE ENCOUNTER
Called and spoke to pt informed him  all lab are noted to be clinically acceptable uric acid is now normal.  Pt verbalized understanding

## 2023-07-12 NOTE — TELEPHONE ENCOUNTER
----- Message from CATRACHITA Aguayo sent at 7/11/2023  1:53 PM CDT -----  Please advise the patient that all lab are noted to be clinically acceptable, Uric Acid is now normal, please fax lab results to his PCP Dr. Contreras as well, we will continue to monitor at future lab draws

## 2023-07-14 NOTE — TELEPHONE ENCOUNTER
Specialty Pharmacy - Refill Coordination    Specialty Medication Orders Linked to Encounter      Flowsheet Row Most Recent Value   Medication #1 secukinumab (COSENTYX PEN, 2 PENS,) 150 mg/mL PnIj (Order#[id number], Rx#)            Refill Questions - Documented Responses      Flowsheet Row Most Recent Value   Patient Availability and HIPAA Verification    Does patient want to proceed with activity? Yes   HIPAA/medical authority confirmed? Yes   Relationship to patient of person spoken to? Self   Refill Screening Questions    Changes to allergies? No   Changes to medications? No   New conditions since last clinic visit? No   Unplanned office visit, urgent care, ED, or hospital admission in the last 4 weeks? No   How does patient/caregiver feel medication is working? Too soon to tell   Financial problems or insurance changes? No   How many doses of your specialty medications were missed in the last 4 weeks? 0   Would patient like to speak to a pharmacist? No   When does the patient need to receive the medication? 07/23/23   Refill Delivery Questions    How will the patient receive the medication? Mail   When does the patient need to receive the medication? 07/23/23   Shipping Address Home   Address in [facility] confirmed and updated if neccessary? Yes   Expected Copay ($) 0   Is the patient able to afford the medication copay? Yes   Payment Method zero copay   Days supply of Refill 30   Supplies needed? No supplies needed   Refill activity completed? Yes   Refill activity plan Refill scheduled   Shipment/Pickup Date: 07/19/23            Current Outpatient Medications   Medication Sig    ALCOHOL PREP PADS PadM SMARTSIG:Pledget(s) Topical    allopurinoL (ZYLOPRIM) 100 MG tablet Take 100 mg by mouth.    folic acid (FOLVITE) 1 MG tablet Take 1 tablet (1 mg total) by mouth once daily.    methotrexate 2.5 MG Tab Take 4 tablets on Friday morning and 4 tablets on Friday night, once a week. Hold for infection or fever.     PROTONIX 40 mg tablet Take 40 mg by mouth once daily.    secukinumab (COSENTYX PEN, 2 PENS,) 150 mg/mL PnIj Inject 2 mL( 150 mg) into the skin every 30 days. Hold for any fever or infection.    SHARPS CONTAINER use as directed   Last reviewed on 7/11/2023 10:39 AM by CATRACHITA Aguayo    Review of patient's allergies indicates:  No Known Allergies Last reviewed on  7/11/2023 10:39 AM by Angeles Hernandez      Tasks added this encounter   No tasks added.   Tasks due within next 3 months   No tasks due.     Rosa Maria Araujo, PharmD  [facility]  [address] [facility] [address]  Phone: [phone]  Fax: [phone]

## 2023-07-19 ENCOUNTER — SPECIALTY PHARMACY (OUTPATIENT)
Dept: PHARMACY | Facility: CLINIC | Age: 43
End: 2023-07-19
Payer: MEDICAID

## 2023-07-19 DIAGNOSIS — M06.09 RHEUMATOID ARTHRITIS, SERONEGATIVE, MULTIPLE SITES: ICD-10-CM

## 2023-07-19 DIAGNOSIS — M12.80 HLA-B27 POSITIVE ARTHROPATHY: Primary | ICD-10-CM

## 2023-07-19 RX ORDER — SECUKINUMAB 150 MG/ML
150 INJECTION SUBCUTANEOUS
Qty: 1 ML | Refills: 4 | Status: ACTIVE | OUTPATIENT
Start: 2023-07-19 | End: 2023-09-18 | Stop reason: SDUPTHER

## 2023-07-19 RX ORDER — SECUKINUMAB 150 MG/ML
150 INJECTION SUBCUTANEOUS
Qty: 1 ML | Refills: 4 | Status: SHIPPED | OUTPATIENT
Start: 2023-07-19 | End: 2023-07-19 | Stop reason: SDUPTHER

## 2023-08-11 ENCOUNTER — SPECIALTY PHARMACY (OUTPATIENT)
Dept: PHARMACY | Facility: CLINIC | Age: 43
End: 2023-08-11
Payer: MEDICAID

## 2023-08-11 NOTE — TELEPHONE ENCOUNTER
Specialty Pharmacy - Refill Coordination    Specialty Medication Orders Linked to Encounter      Flowsheet Row Most Recent Value   Medication #1 secukinumab (COSENTYX PEN) 150 mg/mL PnIj (Order#[id number], Rx#[id number])            Refill Questions - Documented Responses      Flowsheet Row Most Recent Value   Patient Availability and HIPAA Verification    Does patient want to proceed with activity? Yes   HIPAA/medical authority confirmed? Yes   Relationship to patient of person spoken to? Self   Refill Screening Questions    Changes to allergies? No   Changes to medications? No   New conditions since last clinic visit? No   Unplanned office visit, urgent care, ED, or hospital admission in the last 4 weeks? No   How does patient/caregiver feel medication is working? Good   Financial problems or insurance changes? No   How many doses of your specialty medications were missed in the last 4 weeks? 0   Would patient like to speak to a pharmacist? No   When does the patient need to receive the medication? 08/21/23   Refill Delivery Questions    How will the patient receive the medication? Mail   When does the patient need to receive the medication? 08/21/23   Shipping Address Home   Address in [facility] confirmed and updated if neccessary? Yes   Expected Copay ($) 0   Is the patient able to afford the medication copay? Yes   Payment Method zero copay   Days supply of Refill 30   Supplies needed? No supplies needed   Refill activity completed? Yes   Refill activity plan Refill scheduled   Shipment/Pickup Date: 08/21/23            Current Outpatient Medications   Medication Sig    ALCOHOL PREP PADS PadM SMARTSIG:Pledget(s) Topical    allopurinoL (ZYLOPRIM) 100 MG tablet Take 100 mg by mouth.    folic acid (FOLVITE) 1 MG tablet Take 1 tablet (1 mg total) by mouth once daily.    methotrexate 2.5 MG Tab Take 4 tablets on Friday morning and 4 tablets on Friday night, once a week. Hold for infection or fever.    PROTONIX  40 mg tablet Take 40 mg by mouth once daily.    secukinumab (COSENTYX PEN) 150 mg/mL PnIj Inject 1 mL (150 mg) into the skin every 30 days. Hold for fever or infections.    SHARPS CONTAINER use as directed   Last reviewed on 7/11/2023 10:39 AM by Angeles Johns FNP    Review of patient's allergies indicates:  No Known Allergies Last reviewed on  7/19/2023 11:37 AM by Angeles Hernandez      Tasks added this encounter   No tasks added.   Tasks due within next 3 months   No tasks due.     Sheryl Fox [facility]  [address] [address]  Phone: [phone]  Fax: [phone]

## 2023-09-13 DIAGNOSIS — Z79.899 HIGH RISK MEDICATION USE: ICD-10-CM

## 2023-09-13 DIAGNOSIS — M06.09 RHEUMATOID ARTHRITIS, SERONEGATIVE, MULTIPLE SITES: ICD-10-CM

## 2023-09-13 DIAGNOSIS — M12.80 HLA-B27 POSITIVE ARTHROPATHY: ICD-10-CM

## 2023-09-13 RX ORDER — METHOTREXATE 2.5 MG/1
TABLET ORAL
Qty: 40 TABLET | Refills: 0 | Status: SHIPPED | OUTPATIENT
Start: 2023-09-13 | End: 2023-09-18 | Stop reason: SDUPTHER

## 2023-09-18 ENCOUNTER — OFFICE VISIT (OUTPATIENT)
Dept: RHEUMATOLOGY | Facility: CLINIC | Age: 43
End: 2023-09-18
Payer: MEDICARE

## 2023-09-18 ENCOUNTER — HOSPITAL ENCOUNTER (OUTPATIENT)
Dept: RADIOLOGY | Facility: HOSPITAL | Age: 43
Discharge: HOME | End: 2023-09-18
Attending: INTERNAL MEDICINE
Payer: MEDICARE

## 2023-09-18 VITALS
SYSTOLIC BLOOD PRESSURE: 130 MMHG | RESPIRATION RATE: 20 BRPM | DIASTOLIC BLOOD PRESSURE: 79 MMHG | TEMPERATURE: 98 F | WEIGHT: 190.63 LBS | HEART RATE: 95 BPM | BODY MASS INDEX: 28.24 KG/M2 | OXYGEN SATURATION: 97 % | HEIGHT: 69 IN

## 2023-09-18 DIAGNOSIS — M25.561 CHRONIC PAIN OF RIGHT KNEE: ICD-10-CM

## 2023-09-18 DIAGNOSIS — M12.80 HLA-B27 POSITIVE ARTHROPATHY: Primary | ICD-10-CM

## 2023-09-18 DIAGNOSIS — M54.42 CHRONIC MIDLINE LOW BACK PAIN WITH BILATERAL SCIATICA: ICD-10-CM

## 2023-09-18 DIAGNOSIS — M12.80 HLA-B27 POSITIVE ARTHROPATHY: ICD-10-CM

## 2023-09-18 DIAGNOSIS — G89.29 CHRONIC PAIN OF RIGHT KNEE: ICD-10-CM

## 2023-09-18 DIAGNOSIS — M1A.09X0 CHRONIC GOUT OF MULTIPLE SITES, UNSPECIFIED CAUSE: ICD-10-CM

## 2023-09-18 DIAGNOSIS — Z79.899 HIGH RISK MEDICATION USE: ICD-10-CM

## 2023-09-18 DIAGNOSIS — M21.41 PES PLANUS OF BOTH FEET: ICD-10-CM

## 2023-09-18 DIAGNOSIS — M06.09 RHEUMATOID ARTHRITIS, SERONEGATIVE, MULTIPLE SITES: ICD-10-CM

## 2023-09-18 DIAGNOSIS — G89.29 CHRONIC MIDLINE LOW BACK PAIN WITH BILATERAL SCIATICA: ICD-10-CM

## 2023-09-18 DIAGNOSIS — A53.0 POSITIVE SEROLOGY FOR SYPHILIS: ICD-10-CM

## 2023-09-18 DIAGNOSIS — M54.41 CHRONIC MIDLINE LOW BACK PAIN WITH BILATERAL SCIATICA: ICD-10-CM

## 2023-09-18 DIAGNOSIS — M21.42 PES PLANUS OF BOTH FEET: ICD-10-CM

## 2023-09-18 PROCEDURE — 3075F PR MOST RECENT SYSTOLIC BLOOD PRESS GE 130-139MM HG: ICD-10-PCS | Mod: CPTII,,, | Performed by: INTERNAL MEDICINE

## 2023-09-18 PROCEDURE — 99214 PR OFFICE/OUTPT VISIT, EST, LEVL IV, 30-39 MIN: ICD-10-PCS | Mod: S$PBB,,, | Performed by: INTERNAL MEDICINE

## 2023-09-18 PROCEDURE — 3078F PR MOST RECENT DIASTOLIC BLOOD PRESSURE < 80 MM HG: ICD-10-PCS | Mod: CPTII,,, | Performed by: INTERNAL MEDICINE

## 2023-09-18 PROCEDURE — 99214 OFFICE O/P EST MOD 30 MIN: CPT | Mod: S$PBB,,, | Performed by: INTERNAL MEDICINE

## 2023-09-18 PROCEDURE — 3075F SYST BP GE 130 - 139MM HG: CPT | Mod: CPTII,,, | Performed by: INTERNAL MEDICINE

## 2023-09-18 PROCEDURE — 73560 X-RAY EXAM OF KNEE 1 OR 2: CPT | Mod: TC,LT

## 2023-09-18 PROCEDURE — 1159F PR MEDICATION LIST DOCUMENTED IN MEDICAL RECORD: ICD-10-PCS | Mod: CPTII,,, | Performed by: INTERNAL MEDICINE

## 2023-09-18 PROCEDURE — 99214 OFFICE O/P EST MOD 30 MIN: CPT | Mod: PBBFAC | Performed by: INTERNAL MEDICINE

## 2023-09-18 PROCEDURE — 3008F PR BODY MASS INDEX (BMI) DOCUMENTED: ICD-10-PCS | Mod: CPTII,,, | Performed by: INTERNAL MEDICINE

## 2023-09-18 PROCEDURE — 3078F DIAST BP <80 MM HG: CPT | Mod: CPTII,,, | Performed by: INTERNAL MEDICINE

## 2023-09-18 PROCEDURE — 3008F BODY MASS INDEX DOCD: CPT | Mod: CPTII,,, | Performed by: INTERNAL MEDICINE

## 2023-09-18 PROCEDURE — 1159F MED LIST DOCD IN RCRD: CPT | Mod: CPTII,,, | Performed by: INTERNAL MEDICINE

## 2023-09-18 PROCEDURE — 73560 X-RAY EXAM OF KNEE 1 OR 2: CPT | Mod: TC,RT

## 2023-09-18 RX ORDER — SECUKINUMAB 150 MG/ML
150 INJECTION SUBCUTANEOUS
Qty: 1 ML | Refills: 6 | Status: SHIPPED | OUTPATIENT
Start: 2023-09-18 | End: 2023-09-18 | Stop reason: SDUPTHER

## 2023-09-18 RX ORDER — SECUKINUMAB 150 MG/ML
150 INJECTION SUBCUTANEOUS
Qty: 1 ML | Refills: 6 | Status: ACTIVE | OUTPATIENT
Start: 2023-09-18 | End: 2023-12-18 | Stop reason: SDUPTHER

## 2023-09-18 RX ORDER — METHOTREXATE 2.5 MG/1
TABLET ORAL
Qty: 40 TABLET | Refills: 3 | Status: SHIPPED | OUTPATIENT
Start: 2023-09-18 | End: 2023-10-12 | Stop reason: SDUPTHER

## 2023-09-18 RX ORDER — FOLIC ACID 1 MG/1
1 TABLET ORAL DAILY
Qty: 30 TABLET | Refills: 6 | Status: SHIPPED | OUTPATIENT
Start: 2023-09-18 | End: 2023-12-18 | Stop reason: SDUPTHER

## 2023-09-18 NOTE — PROGRESS NOTES
Patient ID: [id number]     Chief Complaint: positive Spondylo-arthropathy (Pt having to left knee joint. Also bilateral foot pain) and Disease Management    HPI:     Trey Harper is a 43 y.o. male here today for follow up of spondyloarthritis, +HLA-B27.      History of +HLA-B27 spondyloarthritis/reactive arthritis diagnosed in 2022 after presentation to ED on 11/22/2022 with c/o of pain and swelling in left wrist, left hand, bilateral ankles and right knee.  History of bloody diarrhea few weeks prior to that presentation.  He was treated with ciprofloxacin and Flagyl at the time. History of septic arthritis in right knee when he was 5 to 6 years old. He previously tested positive for syphilis and was treated with penicillin in 9/2022.  Secondary failure of Humira.     Currently taking methotrexate 20 mg per week with folic acid 1 mg daily and Cosentyx 150 mg subQ every 4 weeks.  Tolerating medication well without any issues.  C/o pain in left knee and bilateral feet. He had seen podiatry in the past, he was told he need braces for ankles. e sees Dr. Garvin (podiatrist) in [place]. He has knee pain for last 4-5 months, worse with activities, His PCP planning to refer him to ortho for knee pain.   Denies red, warm and swollen joints.  Morning stiffness for an hour.  His PCP also started him on allopurinol, currently taking allopurinol 100 mg daily.  Continues to have intermittent back pain.Pain to back is worse when walking and standing on feet. Back pain at night, states has a hard time falling asleep due to pain and having a hard time getting comfortable, when he does fall asleep, very short lived due to pain waking him up.  But back pain is not bothering him now.    Denies any recent illness or hospitalizations since last visit.     PMH: GERD and chronic right knee pain.  Denies fevers, rashes, oral and nasal ulcers, history of DVT or PE, history of stroke or seizures, history of MI, history of  malignancies, Raynaud's phenomenon, history of inflammatory eye diseases, history of inflammatory bowel disease.  Family history of autoimmune disease: Pat Aunt with RA.   Smoking: none     Social History     Tobacco Use   Smoking Status Never   Smokeless Tobacco Never          ----------------------------  Arthritis  Gout, unspecified  Other specified noninfective gastroenteritis and colitis  Syphilis, unspecified     Past Surgical History:   Procedure Laterality Date    SPINE SURGERY  09/19/2022       Review of patient's allergies indicates:  No Known Allergies    Outpatient Medications Marked as Taking for the 9/18/23 encounter (Office Visit) with Kojo Lopez MD   Medication Sig Dispense Refill    ALCOHOL PREP PADS PadM SMARTSIG:Pledget(s) Topical      allopurinoL (ZYLOPRIM) 100 MG tablet Take 100 mg by mouth Daily.      PROTONIX 40 mg tablet Take 40 mg by mouth once daily.      SHARPS CONTAINER use as directed      [DISCONTINUED] folic acid (FOLVITE) 1 MG tablet Take 1 tablet (1 mg total) by mouth once daily. 30 tablet 2    [DISCONTINUED] methotrexate 2.5 MG Tab Take 4 tablets on Friday morning and 4 tablets on Friday night, once a week. Hold for infection or fever. 40 tablet 0    [DISCONTINUED] secukinumab (COSENTYX PEN) 150 mg/mL PnIj Inject 1 mL (150 mg) into the skin every 30 days. Hold for fever or infections. 1 mL 4       Social History     Socioeconomic History    Marital status: Single   Tobacco Use    Smoking status: Never    Smokeless tobacco: Never   Substance and Sexual Activity    Alcohol use: Not Currently    Drug use: Never    Sexual activity: Not Currently     Partners: Male        Family History   Problem Relation Age of Onset    Cancer Father     Rheum arthritis Paternal Aunt     Rheum arthritis Paternal Grandmother         Immunization History   Administered Date(s) Administered    DT (Pediatric) 12/17/1984, 09/30/1994    DTP 1980, 1980, 1980, 10/22/1981    MMR  "06/25/1981    OPV 1980, 1980, 1980, 10/22/1981       Patient Care Team:  Prisca Contreras MD as PCP - General (Internal Medicine)  Rosa Maria Araujo PharmD as Pharmacist (Pharmacist)     Subjective:     ROS    Constitutional:  Denies chills. Denies fever. Denies night sweats. Denies weight loss.   Ophthalmology: Denies blurred vision. Denies dry eyes. Denies eye pain. Denies Itching and redness.   ENT: Denies oral ulcers. Denies epistaxis. Denies dry mouth. Denies swollen glands.   Endocrine: Denies diabetes. Denies thyroid Problems.   Respiratory: Denies cough. Denies shortness of breath. Denies shortness of breath with exertion. Denies hemoptysis.   Cardiovascular: Denies chest pain at rest. Denies chest pain with exertion. Denies palpitations.  Did complain of tachycardia at last visit which he states has resolved- did not see cardio  Gastrointestinal: Denies abdominal pain. Denies diarrhea. Denies nausea. Denies vomiting. Denies hematemesis or hematochezia. Denies heartburn.  Genitourinary: Denies blood in urine.  Musculoskeletal: See HPI for details  Integumentary: Denies rash. Denies photosensitivity.   Peripheral Vascular: Denies Ulcers of hands and/or feet. Denies Cold extremities.   Neurologic: Denies dizziness. Denies headache.  Denies loss of strength. Denies numbness or tingling.   Psychiatric: Denies depression and anxiety- doing much better since he is feeling better. Denies suicidal/homicidal ideations.      Objective:     /79 (BP Location: Left arm, Patient Position: Sitting, BP Method: Small (Automatic))   Pulse 95   Temp 98.1 °F (36.7 °C) (Oral)   Resp 20   Ht 5' 9" (1.753 m)   Wt 86.5 kg (190 lb 9.6 oz)   SpO2 97%   BMI 28.15 kg/m²     Physical Exam    General Appearance: alert, pleasant, in no acute distress.  Skin: Skin color, texture, turgor normal. No rashes or lesions.  Eyes:  extraocular movement intact (EOMI), pupils equal, round, reactive to light and " accommodation, conjunctiva clear.  ENT: No oral or nasal ulcers.  Neck:  Neck supple. No adenopathy.   Lungs: CTA throughout without crackles, rhonchi, or wheezes.   Heart: RRR w/o murmurs.  No edema.   Neuro: Alert, oriented, CN II-XII GI, sensory and motor innervation intact.  Musculoskeletal: No pain to bilateral MCPs, FROM noted to right wrist, left wrist limited, no pain, elbows and shoulders with no pain, + crepitus noted bilaterally. No pain to bilateral MTPs. Pes planus noted to bilateral feet with R>L.  Crepitus in bilateral knees.  Tenderness with range of motion of left knee, no swelling or redness on exam today.  Psych: Alert, oriented, normal eye contact.    Labs:   2022: Hep B and C negative. HIV negative. Uric acid normal. ANCA negative. HLAB 27 positive. RPR and syphilis ab positive.  Chlamydia and gonorrhea PCR negative.  BASIL negative.  Rheumatoid factor and anti CCP negative.  EBV IgM negative, IgG positive.  Parvovirus B19 not detected.  EBV DNA not detected. Blood culture negative.   2022:  WBC count 17.7.  Hemoglobin 13.4.  Platelet count elevated 447.  ESR elevated 128, normal less than 15.  Calcium slightly elevated 10.8.  Alk phos 172.  CMP okay.  ALT upper limit of normal.  CRP elevated 131.  23:  CBC, CMP okay.  ESR, CRP normal. Quantiferon tb negative.   3/27/23: CBC ok. ESR 21, . CMP ok.   23:  1+ protein and no blood in urine.  Upc 100 milligram/gram.  Urine chlamydia gonorrhea PCR negative.  2023 CMP Creat 1.22 (previous 1.06). Calcium 10.6 (previous 9.8), otherwise ok. CRP 97.50 (previous 203.70, normal <5). CBC hgb 13.4 (previous 12.3). platelet 444 (previous 275), otherwise ok. Sed Rate 24 (<15). Uric Acid 7 (previous 4.9).   2023 CBC ok. CMP ok. Sed Rate 4 (<15), CRP 2.90 (<5). Uric Acid 4.5     Imagin22:  X-ray of left wrist showed possible small avulsion injury at the distal tip of the ulnar styloid process, no erosions.  X-ray of  left ankle showed soft tissue swelling on lateral side.  Normal x-ray of right ankle.  Normal x-ray of right knee.    1/17/23:  Normal chest x-ray.  X-ray of right hand showed mild DJD.  X-ray of left hand showed DJD.     11/22/2022:  Echocardiogram showed EF 64%, normal ejection fraction.  Normal right ventricular function.  Normal left ventricular systolic and diastolic function.    5/8/2023: SI Joint Xray: Mild SI Joint sclerosis     Assessment:       ICD-10-CM ICD-9-CM   1. HLA-B27 positive arthropathy  M12.80 716.80   2. Rheumatoid arthritis, seronegative, multiple sites  M06.09 714.0   3. High risk medication use  Z79.899 V58.69   4. Chronic pain of right knee  M25.561 719.46    G89.29 338.29   5. Positive serology for syphilis  A53.0 097.1   6. Chronic midline low back pain with bilateral sciatica  M54.41 724.2    M54.42 724.3    G89.29 338.29   7. Pes planus of both feet  M21.41 734    M21.42    8. Chronic gout of multiple sites, unspecified cause  M1A.09X0 274.02                Plan:     1. HLA-B27 positive arthropathy:  Diagnosed in 2022 after presentation to ED on 11/22/2022 with c/o of pain and swelling in left wrist, left hand, bilateral ankles and right knee. Started with pain and swelling in left wrist and progress to bilateral ankles and right knee.  Denies fevers or recent travel. History of bloody diarrhea in second week of 11/2022 in which he presented to ER for eval.  Negative RF and anti CCP. BASIL negative. HLA B27 Positive. Work up negative for EBV, Parvovirus, Chlamydia and Gonorrhea and blood cultures negative. No rashes, ECHO normal with no vegetations. Hep panel, HIV negative.  Started on MTX in November of 2022, Humira added in December of 2022- worked well until March of 2023 secondary failure. Limited ROM of left wrist, repeat X-ray Jan 2023 showed showed DJD. Started Cosentyx June 2023 and tolerating well, also continues MTX 8 tab po qweek with folic acid 1 mg po qd.  Today on exam, no  active synovitis noted.     -Continue  MTX 20 mg po qweek and folic acid 1 mg daily.   -Continue Cosentyx 150 mg subq every 4 weeks. If needed can increase dose of Cosentyx.  -Infection w/u up to date and negative  -Lab today for continued monitoring (we will also fax lab today to his PCP as he does have an upcoming apt)     2. Rheumatoid arthritis, seronegative, multiple sites   -See above     3. High risk medication use   -Advised to stay up-to-date on age appropriate vaccinations and malignancy screening with PCP.   -Persons with rheumatoid arthritis, lupus, psoriatic arthritis and other autoimmune diseases are at increased risk of cardiovascular disease including heart attack and stroke. We recommend that all patients with these conditions have annual health maintenance exams including lipid measurements, blood pressure measurements, and smoking cessation counseling when applicable at their primary care provider's office.   -Continued lab monitoring.   -Reminded to hold meds of Cosentyx and MTX for any fever or infections, also monitor ETOH with MTX to 1-2 drinks a week.      4. Chronic pain of right knee and secondary DJD:   -Secondary to infection he had as a kid and secondary arthritis.   -C/o pain in left knee today, will get X-rays. No inflammation on exam. Agree with seeing ortho.      5. History of Syphilis  -T[facility] with [facility]N [facility].      6. 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 Unsure procedure- on September 2022 with Dr. Burgos- has continued to follow up  -Offered PT in the past.      7. Pes Planus  -Bilateral feet with R>L- currently followed by Dr. Robb in [place] (podiatry)- recommended orthotics. May be contributing to some of his foot pain.      8. Gout  -Currently treated by his PCP as noted Uric acid elevated in May 2023 at 7  -Continue Allopurinol 100 mg po qd with PCP as directed.         Follow up in  about 3 months (around 12/18/2023) for with NP and 6 months with me. In addition to their scheduled follow up, the patient has also been instructed to follow up on as needed basis.      Total time spent with patient and documentation is more than 30 minutes. All questions were answered to patient's satisfaction and patient verbalized understanding.

## 2023-10-12 DIAGNOSIS — Z79.899 HIGH RISK MEDICATION USE: ICD-10-CM

## 2023-10-12 DIAGNOSIS — M06.09 RHEUMATOID ARTHRITIS, SERONEGATIVE, MULTIPLE SITES: ICD-10-CM

## 2023-10-12 DIAGNOSIS — M12.80 HLA-B27 POSITIVE ARTHROPATHY: ICD-10-CM

## 2023-10-12 RX ORDER — METHOTREXATE 2.5 MG/1
TABLET ORAL
Qty: 40 TABLET | Refills: 3 | Status: SHIPPED | OUTPATIENT
Start: 2023-10-12 | End: 2023-12-18 | Stop reason: SDUPTHER

## 2023-11-14 DIAGNOSIS — Z79.899 HIGH RISK MEDICATION USE: ICD-10-CM

## 2023-11-14 DIAGNOSIS — M06.09 RHEUMATOID ARTHRITIS, SERONEGATIVE, MULTIPLE SITES: ICD-10-CM

## 2023-11-14 DIAGNOSIS — M12.80 HLA-B27 POSITIVE ARTHROPATHY: ICD-10-CM

## 2023-11-14 RX ORDER — METHOTREXATE 2.5 MG/1
TABLET ORAL
Qty: 40 TABLET | Refills: 3 | Status: CANCELLED | OUTPATIENT
Start: 2023-11-14

## 2023-11-16 NOTE — TELEPHONE ENCOUNTER
Refills have been requested for the following medications:         methotrexate 2.5 MG Tab [Kojo Lopez]         Attempted to call preferred pharmacy to see if patient has  medication yet because 10/12/23 three refill were sent for methotrexate 2.5 mg.    Then called pharmacy and was told that medication has refills and is on hold until patient calls and asks for refill.    Then called and spoke with patient and notified that medication has refills and instructed for him to call pharmacy.

## 2023-11-26 ENCOUNTER — HOSPITAL ENCOUNTER (EMERGENCY)
Facility: HOSPITAL | Age: 43
Discharge: HOME | End: 2023-11-27
Attending: FAMILY MEDICINE
Payer: MEDICARE

## 2023-11-26 DIAGNOSIS — J03.90 TONSILLITIS: Primary | ICD-10-CM

## 2023-11-26 LAB
BASOPHILS # BLD AUTO: 0.03 X10(3)/MCL
BASOPHILS NFR BLD AUTO: 0.2 %
EOSINOPHIL # BLD AUTO: 0.06 X10(3)/MCL (ref 0–0.9)
EOSINOPHIL NFR BLD AUTO: 0.5 %
ERYTHROCYTE [DISTWIDTH] IN BLOOD BY AUTOMATED COUNT: 14.4 % (ref 11.5–17)
HCT VFR BLD AUTO: 42.3 % (ref 42–52)
HGB BLD-MCNC: 14.1 G/DL (ref 14–18)
IMM GRANULOCYTES # BLD AUTO: 0.05 X10(3)/MCL (ref 0–0.04)
IMM GRANULOCYTES NFR BLD AUTO: 0.4 %
LYMPHOCYTES # BLD AUTO: 1.89 X10(3)/MCL (ref 0.6–4.6)
LYMPHOCYTES NFR BLD AUTO: 14.7 %
MCH RBC QN AUTO: 29 PG (ref 27–31)
MCHC RBC AUTO-ENTMCNC: 33.3 G/DL (ref 33–36)
MCV RBC AUTO: 86.9 FL (ref 80–94)
MONOCYTES # BLD AUTO: 1.17 X10(3)/MCL (ref 0.1–1.3)
MONOCYTES NFR BLD AUTO: 9.1 %
NEUTROPHILS # BLD AUTO: 9.69 X10(3)/MCL (ref 2.1–9.2)
NEUTROPHILS NFR BLD AUTO: 75.1 %
NRBC BLD AUTO-RTO: 0 %
PLATELET # BLD AUTO: 175 X10(3)/MCL (ref 130–400)
PMV BLD AUTO: 9.9 FL (ref 7.4–10.4)
RBC # BLD AUTO: 4.87 X10(6)/MCL (ref 4.7–6.1)
STREP A PCR (OHS): NOT DETECTED
WBC # SPEC AUTO: 12.89 X10(3)/MCL (ref 4.5–11.5)

## 2023-11-26 PROCEDURE — 87651 STREP A DNA AMP PROBE: CPT | Performed by: PHYSICIAN ASSISTANT

## 2023-11-26 PROCEDURE — 80053 COMPREHEN METABOLIC PANEL: CPT | Performed by: PHYSICIAN ASSISTANT

## 2023-11-26 PROCEDURE — 99285 EMERGENCY DEPT VISIT HI MDM: CPT | Mod: 25

## 2023-11-26 PROCEDURE — 96375 TX/PRO/DX INJ NEW DRUG ADDON: CPT

## 2023-11-26 PROCEDURE — 25000003 PHARM REV CODE 250: Performed by: PHYSICIAN ASSISTANT

## 2023-11-26 PROCEDURE — 85025 COMPLETE CBC W/AUTO DIFF WBC: CPT | Performed by: PHYSICIAN ASSISTANT

## 2023-11-26 PROCEDURE — 63600175 PHARM REV CODE 636 W HCPCS: Performed by: PHYSICIAN ASSISTANT

## 2023-11-26 PROCEDURE — 0240U COVID/FLU A&B PCR: CPT | Performed by: PHYSICIAN ASSISTANT

## 2023-11-26 PROCEDURE — 96361 HYDRATE IV INFUSION ADD-ON: CPT

## 2023-11-26 RX ORDER — SILDENAFIL 100 MG/1
100 TABLET, FILM COATED ORAL DAILY PRN
COMMUNITY

## 2023-11-26 RX ORDER — MELOXICAM 7.5 MG/1
7.5 TABLET ORAL DAILY
COMMUNITY

## 2023-11-26 RX ORDER — DEXAMETHASONE SODIUM PHOSPHATE 4 MG/ML
8 INJECTION, SOLUTION INTRA-ARTICULAR; INTRALESIONAL; INTRAMUSCULAR; INTRAVENOUS; SOFT TISSUE
Status: COMPLETED | OUTPATIENT
Start: 2023-11-26 | End: 2023-11-26

## 2023-11-26 RX ORDER — SODIUM CHLORIDE 9 MG/ML
1000 INJECTION, SOLUTION INTRAVENOUS
Status: COMPLETED | OUTPATIENT
Start: 2023-11-26 | End: 2023-11-27

## 2023-11-26 RX ORDER — KETOROLAC TROMETHAMINE 30 MG/ML
15 INJECTION, SOLUTION INTRAMUSCULAR; INTRAVENOUS ONCE
Status: COMPLETED | OUTPATIENT
Start: 2023-11-27 | End: 2023-11-26

## 2023-11-26 RX ADMIN — SODIUM CHLORIDE 1000 ML: 9 INJECTION, SOLUTION INTRAVENOUS at 11:11

## 2023-11-26 RX ADMIN — KETOROLAC TROMETHAMINE 15 MG: 30 INJECTION, SOLUTION INTRAMUSCULAR at 11:11

## 2023-11-26 RX ADMIN — DEXAMETHASONE SODIUM PHOSPHATE 8 MG: 4 INJECTION, SOLUTION INTRA-ARTICULAR; INTRALESIONAL; INTRAMUSCULAR; INTRAVENOUS; SOFT TISSUE at 11:11

## 2023-11-27 VITALS
TEMPERATURE: 98 F | HEART RATE: 80 BPM | HEIGHT: 69 IN | RESPIRATION RATE: 19 BRPM | DIASTOLIC BLOOD PRESSURE: 88 MMHG | SYSTOLIC BLOOD PRESSURE: 133 MMHG | BODY MASS INDEX: 27.58 KG/M2 | WEIGHT: 186.19 LBS | OXYGEN SATURATION: 98 %

## 2023-11-27 LAB
ALBUMIN SERPL-MCNC: 3.7 G/DL (ref 3.5–5)
ALBUMIN/GLOB SERPL: 1.1 RATIO (ref 1.1–2)
ALP SERPL-CCNC: 97 UNIT/L (ref 40–150)
ALT SERPL-CCNC: 22 UNIT/L (ref 0–55)
AST SERPL-CCNC: 19 UNIT/L (ref 5–34)
BILIRUB SERPL-MCNC: 0.7 MG/DL
BUN SERPL-MCNC: 18.7 MG/DL (ref 8.9–20.6)
CALCIUM SERPL-MCNC: 9.4 MG/DL (ref 8.4–10.2)
CHLORIDE SERPL-SCNC: 106 MMOL/L (ref 98–107)
CO2 SERPL-SCNC: 24 MMOL/L (ref 22–29)
CREAT SERPL-MCNC: 1.2 MG/DL (ref 0.73–1.18)
FLUAV AG UPPER RESP QL IA.RAPID: NOT DETECTED
FLUBV AG UPPER RESP QL IA.RAPID: NOT DETECTED
GFR SERPLBLD CREATININE-BSD FMLA CKD-EPI: >60 MLS/MIN/1.73/M2
GLOBULIN SER-MCNC: 3.4 GM/DL (ref 2.4–3.5)
GLUCOSE SERPL-MCNC: 116 MG/DL (ref 74–100)
HOLD SPECIMEN: NORMAL
POTASSIUM SERPL-SCNC: 3.7 MMOL/L (ref 3.5–5.1)
PROT SERPL-MCNC: 7.1 GM/DL (ref 6.4–8.3)
SARS-COV-2 RNA RESP QL NAA+PROBE: NOT DETECTED
SODIUM SERPL-SCNC: 141 MMOL/L (ref 136–145)

## 2023-11-27 PROCEDURE — 96361 HYDRATE IV INFUSION ADD-ON: CPT

## 2023-11-27 PROCEDURE — 25000003 PHARM REV CODE 250: Performed by: PHYSICIAN ASSISTANT

## 2023-11-27 PROCEDURE — 63600175 PHARM REV CODE 636 W HCPCS: Performed by: PHYSICIAN ASSISTANT

## 2023-11-27 PROCEDURE — 25500020 PHARM REV CODE 255: Performed by: PHYSICIAN ASSISTANT

## 2023-11-27 PROCEDURE — 96365 THER/PROPH/DIAG IV INF INIT: CPT | Mod: 59

## 2023-11-27 RX ORDER — AMOXICILLIN AND CLAVULANATE POTASSIUM 875; 125 MG/1; MG/1
1 TABLET, FILM COATED ORAL 2 TIMES DAILY
Qty: 20 TABLET | Refills: 0 | Status: SHIPPED | OUTPATIENT
Start: 2023-11-27 | End: 2023-12-18 | Stop reason: ALTCHOICE

## 2023-11-27 RX ORDER — HYDROCODONE BITARTRATE AND ACETAMINOPHEN 7.5; 325 MG/1; MG/1
1 TABLET ORAL ONCE
Status: COMPLETED | OUTPATIENT
Start: 2023-11-27 | End: 2023-11-27

## 2023-11-27 RX ORDER — PREDNISONE 10 MG/1
10 TABLET ORAL DAILY
Qty: 21 TABLET | Refills: 0 | OUTPATIENT
Start: 2023-11-27 | End: 2024-01-01

## 2023-11-27 RX ADMIN — CEFTRIAXONE SODIUM 1 G: 1 INJECTION, POWDER, FOR SOLUTION INTRAMUSCULAR; INTRAVENOUS at 02:11

## 2023-11-27 RX ADMIN — HYDROCODONE BITARTRATE AND ACETAMINOPHEN 1 TABLET: 7.5; 325 TABLET ORAL at 01:11

## 2023-11-27 RX ADMIN — IOHEXOL 100 ML: 350 INJECTION, SOLUTION INTRAVENOUS at 01:11

## 2023-11-27 NOTE — ED PROVIDER NOTES
Encounter Date: 11/26/2023       History     Chief Complaint   Patient presents with    Sore Throat     Reports with sore throat since yesterday. Partner states patient was running a fever at home with highest at 102.5, but states he has been giving ibuprofen and tylenol around the clock with the last dose of tylenol 650 mg at 1730 today. Patient reports pain with swallowing but denies drooling. Talking freely in triage.      43-year-old male with past medical history significant for RA presents to ED complaining of 1 day history of sore throat and fever.  Patient reports as the day has gone on he is had in increasing difficulty with swallowing, but reports he is still able to swallow liquids.  Patient's voice appears to be muffled during my exam and him and his partner both agree that they hear a voice change as well.  Patient has been alternating Tylenol and ibuprofen throughout the day for fever control and was afebrile on arrival in ED. patient reports that when he tried to sleep he woke up several times feeling like his airway was closing and his partner endorses this stating he saw patient has struggled to breathe and jump up gasping for air.  Denies stridor, drooling, trismus, congestion, rhinorrhea, otalgia, otorrhea, cough, eye discharge, penile discharge, dysuria, genital sores, concern for STD exposure.  Vital signs stable on arrival, patient in no acute distress.      Review of patient's allergies indicates:  No Known Allergies  Past Medical History:   Diagnosis Date    Arthritis     Gout, unspecified     Other specified noninfective gastroenteritis and colitis     Syphilis, unspecified      Past Surgical History:   Procedure Laterality Date    SPINE SURGERY  09/19/2022     Family History   Problem Relation Age of Onset    Cancer Father     Rheum arthritis Paternal Aunt     Rheum arthritis Paternal Grandmother      Social History     Tobacco Use    Smoking status: Never    Smokeless tobacco: Never    Substance Use Topics    Alcohol use: Not Currently    Drug use: Never     Review of Systems   All other systems reviewed and are negative.      Physical Exam     Initial Vitals [11/26/23 2223]   BP Pulse Resp Temp SpO2   (!) 147/97 91 20 97.9 °F (36.6 °C) 98 %      MAP       --         Physical Exam    Nursing note and vitals reviewed.  Constitutional: He appears well-developed and well-nourished. He is not diaphoretic. No distress.   HENT:   Head: Normocephalic and atraumatic.   Nose: Nose normal.   Mouth/Throat: Uvula is midline and mucous membranes are normal. No trismus in the jaw. No uvula swelling. Posterior oropharyngeal edema and posterior oropharyngeal erythema present. No oropharyngeal exudate.       Eyes: Conjunctivae are normal. Pupils are equal, round, and reactive to light.   Neck: Neck supple.   Normal range of motion.  Cardiovascular:  Normal rate, regular rhythm, normal heart sounds and intact distal pulses.     Exam reveals no gallop and no friction rub.       No murmur heard.  Pulmonary/Chest: Breath sounds normal. No stridor. No respiratory distress. He has no wheezes. He has no rhonchi. He has no rales.   Abdominal: Abdomen is soft. Bowel sounds are normal. He exhibits no distension. There is no abdominal tenderness. There is no rebound and no guarding.   Musculoskeletal:         General: No tenderness or edema. Normal range of motion.      Cervical back: Normal range of motion and neck supple.     Lymphadenopathy:     He has cervical adenopathy.   Neurological: He is alert and oriented to person, place, and time. No cranial nerve deficit. GCS score is 15. GCS eye subscore is 4. GCS verbal subscore is 5. GCS motor subscore is 6.   Skin: Skin is warm and dry. Capillary refill takes less than 2 seconds. No rash noted. No pallor.   Psychiatric: He has a normal mood and affect.         ED Course   Procedures  Labs Reviewed   COMPREHENSIVE METABOLIC PANEL - Abnormal; Notable for the following  components:       Result Value    Glucose Level 116 (*)     Creatinine 1.20 (*)     All other components within normal limits   CBC WITH DIFFERENTIAL - Abnormal; Notable for the following components:    WBC 12.89 (*)     Neut # 9.69 (*)     IG# 0.05 (*)     All other components within normal limits   COVID/FLU A&B PCR - Normal    Narrative:     The Xpert Xpress SARS-CoV-2/FLU/RSV plus is a rapid, multiplexed real-time PCR test intended for the simultaneous qualitative detection and differentiation of SARS-CoV-2, Influenza A, Influenza B, and respiratory syncytial virus (RSV) viral RNA in either nasopharyngeal swab or nasal swab specimens.         STREP GROUP A BY PCR - Normal    Narrative:     The Xpert Xpress Strep A test is a rapid, qualitative in vitro diagnostic test for the detection of Streptococcus pyogenes (Group A ß-hemolytic Streptococcus, Strep A) in throat swab specimens from patients with signs and symptoms of pharyngitis.     CBC W/ AUTO DIFFERENTIAL    Narrative:     The following orders were created for panel order CBC Auto Differential.  Procedure                               Abnormality         Status                     ---------                               -----------         ------                     CBC with Differential[[id number]]       Abnormal            Final result                 Please view results for these tests on the individual orders.   EXTRA TUBES    Narrative:     The following orders were created for panel order EXTRA TUBES.  Procedure                               Abnormality         Status                     ---------                               -----------         ------                     Light Blue Top Hold[[id number]]                             Final result               Light Green Top Hold[[id number]]                            Final result               Gold Top Hold[[id number]]                                   Final result                 Please view results  for these tests on the individual orders.   LIGHT BLUE TOP HOLD   LIGHT GREEN TOP HOLD   GOLD TOP HOLD          Imaging Results              CT Soft Tissue Neck With Contrast (Preliminary result)  Result time 11/27/23 01:26:24      Preliminary result by Miguel A Gregg Jr., MD (11/27/23 01:26:24)                   Narrative:    START OF REPORT:  Technique: CT of the soft tissues of the neck was performed with intravenous contrast with direct axial as well as sagittal and coronal reformations.    Comparison: None.    Clinical history: Sore Throat (Reports with sore throat since yesterday. Partner states patient was running a fever at home with highest at 102.5, but states he has been giving ibuprofen and tylenol around the clock with the last dose of tylenol 650 mg at 1730 today. Patient reports pain with swallowing but denies drooling. Talking freely in triage.    Findings:  Sinuses: The visualized paranasal sinuses are clear.  Nasopharynx: Unremarkable.  Oropharynx: The bilateral pharyngeal tonsils are enlarged. There are areas of relative subtle low attenuation within the tonsils without any areas of rim enhancement which may reflect an element of phlegmon without definitive absccess at this time.  Larynx: Unremarkable.  Trachea: Unremarkable.  Esophagus: Unremarkable.  Vascular structures: Unremarkable.  Carotids: Unremarkable.  Lymph nodes: There are numerous enlarged cervical lymph nodes which are likely reactive.    Bony structures:  Alignment: No listhesis identified.  Mineralization of the Cervical Spine Bony Structures: Normal.  Curvature: Mild straightening of the cervical lordosis is seen. This may be positional or reflect an element of myospasm.  Fractures: None.    Degenerative changes: No significant degenerative changes are seen.    Miscellaneous:  Orthopedic Hardware: None.  Calcification(s): None.      Impression:  1. The bilateral pharyngeal tonsils are enlarged. There are areas of relative  subtle low attenuation within the tonsils without any areas of rim enhancement which may reflect an element of phlegmon without definitive absccess at this time.  2. Details and other findings as described above.                                         Medications   cefTRIAXone (ROCEPHIN) 1 g in dextrose 5 % in water (D5W) 100 mL IVPB (MB+) (1 g Intravenous New Bag 11/27/23 0206)   dexAMETHasone injection 8 mg (8 mg Intravenous Given 11/26/23 2322)   ketorolac injection 15 mg (15 mg Intravenous Given 11/26/23 2322)   0.9%  NaCl infusion (0 mLs Intravenous Stopped 11/27/23 0054)   HYDROcodone-acetaminophen 7.5-325 mg per tablet 1 tablet (1 tablet Oral Given 11/27/23 0104)   iohexoL (OMNIPAQUE 350) injection 100 mL (100 mLs Intravenous Given 11/27/23 0104)     Medical Decision Making  Differential diagnosis: Includes but not limited to COVID, flu, other viral illness, strep, peritonsillar abscess, retropharyngeal abscess    ED management:  Immediately after my initial evaluation I ordered IV Decadron, IV Toradol and IV fluids.  Patient placed in room and placed on cardiac monitor and pulse oximetry.  On reassessment patient reports that symptoms are stable, but states he is still experiencing throat pain.  Patient given p.o. Norco and able to swallow without difficulty.  Once patient returned from CT scan I re-evaluated and there does seem to appear to be mild improvement of tonsillar enlargement following Toradol and steroids.  Patient is tolerating secretions and reports that he is beginning to feel better.  I will give dose of IV Rocephin and then reassess patient for discharge.    ED course:  COVID, flu and strep swabs all negative.  CBC reveals mild leukocytosis of 12.9, otherwise unremarkable.  CMP reveals mild bump in creatinine to 1.20, but otherwise unremarkable.  CT reveals bilateral tonsillar enlargement with areas of subtle low attenuation without any rim enhancement, which may reflect an element of  phlegmon without abscess.  Patient is nontoxic appearing with unremarkable vitals and no signs of impending airway compromise on final reassessment, stable for discharge.  I will send home with prescription for Augmentin and prednisone taper.  Discussed with patient importance of picking up these medications 1st thing in the morning and starting as soon as possible.  Instructed to contact PCP tomorrow morning to schedule close follow-up appointment strict ED precautions given for new or worsening symptoms and patient verbalized understanding.    Amount and/or Complexity of Data Reviewed  Labs: ordered. Decision-making details documented in ED Course.  Radiology: ordered. Decision-making details documented in ED Course.    Risk  Prescription drug management.                                   Clinical Impression:  Final diagnoses:  [J03.90] Tonsillitis (Primary)          ED Disposition Condition    Discharge Good          ED Prescriptions       Medication Sig Dispense Start Date End Date Auth. Provider    amoxicillin-clavulanate 875-125mg (AUGMENTIN) 875-125 mg per tablet Take 1 tablet by mouth 2 (two) times daily. for 10 days 20 tablet 11/27/2023 12/7/2023 Claudio Maloney PA    predniSONE (DELTASONE) 10 MG tablet Take 1 tablet (10 mg total) by mouth once daily. Take 4 tabs x 3 days, then take 2 tabs x 3 days, then take 1 tab x 3 days. 21 tablet 11/27/2023 -- Claudio Maloney PA          Follow-up Information       Follow up With Specialties Details Why Contact Info    Prisca Contreras MD Internal Medicine Call today  [address]  [place] [address]  [phone]      [facility] Emergency Medicine  As needed, If symptoms worsen [address] [id number] [phone]             Claudio Maloney PA  11/27/23 2308

## 2023-12-13 NOTE — PROGRESS NOTES
Patient ID: [id number]     Chief Complaint: HLA-B27 positive arthropathy (Pt stated bottom of feet hurt)    HPI:     Trey Harper is a 43 y.o. male here today for follow up of spondyloarthritis, +HLA-B27.      Presents today for a follow up of +HLA-B27 Spondyloarthritis/reactive arthritis diagnosed in 2022 after presentation to ED on 11/22/2022 with c/o of pain and swelling in left wrist, left hand, bilateral ankles and right knee.  History of bloody diarrhea few weeks prior to that presentation.  He was treated with Ciprofloxacin and Flagyl at the time. History of septic arthritis in right knee when he was 5 to 6 years old. He previously tested positive for Syphilis and was treated with Penicillin in 9/2022.  He was initially started on Humira but developed secondary failure.      He has seen Podiatry in the past and was told he needed braces for his ankles, followed by Dr. Garvin (podiatrist) in [place]. He also reports knee pain for last 4-5 months, worse with activities, his PCP planning to refer him to Ortho for knee pain. He was also started on Allopurinol 100 mg po qd by his PCP. Continues to have intermittent back pain, worse when walking and standing on feet. Reports back pain at night, gas a hard time falling asleep due to pain and getting comfortable, when he does fall asleep he states it is very short lived due to pain waking him up.        December 2023: Here today for follow up.  Currently taking Methotrexate 20 mg per week with folic acid 1 mg daily and Cosentyx 150 mg subQ every 4 weeks. But back pain is not bothering him as much now, will have some aches and pains here and there but for the most part has been much better with meds. He denies any red/warm/swollen joints, morning stiffness lasting roughly 15-20 minutes. He just recently completed PT and plans to continue with at home, reports really helped him. He is upset with his weight as he has gained, plans to loose with exercise. He  has continued to have issues with his feet, mainly with walking, recently visited the [facility] and had some inserts made so hoping this helps. Has also seen Podiatry in the past and was told he needed braces for his ankles, followed by Dr. Garvin (podiatrist) in [place]- he has tried to wear but has been uncomfortable so admits not wearing as directed. He was also seen in ER last month for Tonsillitis and was put on Amoxil.     Denies any recent hospitalizations since last visit.     PMH: GERD and chronic right knee pain.  Denies fevers, rashes, oral and nasal ulcers, history of DVT or PE, history of stroke or seizures, history of MI, history of malignancies, Raynaud's phenomenon, history of inflammatory eye diseases, history of inflammatory bowel disease.  Family history of autoimmune disease: Pat Aunt with RA.   Smoking: none     Social History     Tobacco Use   Smoking Status Never   Smokeless Tobacco Never          ----------------------------  Arthritis  Gout, unspecified  Other specified noninfective gastroenteritis and colitis  Syphilis, unspecified     Past Surgical History:   Procedure Laterality Date    SPINE SURGERY  09/19/2022       Review of patient's allergies indicates:  No Known Allergies    Outpatient Medications Marked as Taking for the 12/18/23 encounter (Office Visit) with Angeles Johns FNP   Medication Sig Dispense Refill    ALCOHOL PREP PADS PadM SMARTSIG:Pledget(s) Topical      allopurinoL (ZYLOPRIM) 100 MG tablet Take 100 mg by mouth Daily.      folic acid (FOLVITE) 1 MG tablet Take 1 tablet (1 mg total) by mouth once daily. 30 tablet 6    meloxicam (MOBIC) 7.5 MG tablet Take 7.5 mg by mouth once daily.      methotrexate 2.5 MG Tab Take 4 tablets on Friday morning and 4 tablets on Friday night, once a week. Hold for infection or fever. 40 tablet 3    predniSONE (DELTASONE) 10 MG tablet Take 1 tablet (10 mg total) by mouth once daily. Take 4 tabs x 3 days, then take 2  tabs x 3 days, then take 1 tab x 3 days. 21 tablet 0    PROTONIX 40 mg tablet Take 40 mg by mouth once daily.      secukinumab (COSENTYX PEN) 150 mg/mL PnIj Inject 150 mg into the skin every 28 days. 1 mL 6    SHARPS CONTAINER use as directed      sildenafiL (VIAGRA) 100 MG tablet Take 100 mg by mouth daily as needed for Erectile Dysfunction.      [DISCONTINUED] amoxicillin (AMOXIL) 875 MG tablet Take 875 mg by mouth every 12 (twelve) hours.         Social History     Socioeconomic History    Marital status: Single   Tobacco Use    Smoking status: Never    Smokeless tobacco: Never   Substance and Sexual Activity    Alcohol use: Not Currently    Drug use: Never    Sexual activity: Not Currently     Partners: Male        Family History   Problem Relation Age of Onset    Cancer Father     Rheum arthritis Paternal Aunt     Rheum arthritis Paternal Grandmother         Immunization History   Administered Date(s) Administered    DT (Pediatric) 12/17/1984, 09/30/1994    DTP 1980, 1980, 1980, 10/22/1981    MMR 06/25/1981    OPV 1980, 1980, 1980, 10/22/1981       Patient Care Team:  Prisca Contreras MD as PCP - General (Internal Medicine)  Rosa Maria Araujo PharmD as Pharmacist (Pharmacist)     Subjective:     ROS    Constitutional:  Denies chills. Denies fever. Denies night sweats. Denies weight loss.   Ophthalmology: Denies blurred vision. Denies dry eyes. Denies eye pain. Denies Itching and redness.   ENT: Denies oral ulcers. Denies epistaxis. Denies dry mouth. Denies swollen glands.   Endocrine: Denies diabetes. Denies thyroid Problems.   Respiratory: Denies cough. Denies shortness of breath. Denies shortness of breath with exertion. Denies hemoptysis.   Cardiovascular: Denies chest pain at rest. Denies chest pain with exertion. Denies palpitations.    Gastrointestinal: Denies abdominal pain. Denies diarrhea. Denies nausea. Denies vomiting. Denies hematemesis or hematochezia. Denies  "heartburn.  Genitourinary: Denies blood in urine.  Musculoskeletal: See HPI for details  Integumentary: Denies rash. Denies photosensitivity.   Peripheral Vascular: Denies Ulcers of hands and/or feet. Denies Cold extremities.   Neurologic: Denies dizziness. Denies headache.  Denies loss of strength. Denies numbness or tingling.   Psychiatric: Denies depression and anxiety- doing much better since he is feeling better. Denies suicidal/homicidal ideations.      Objective:     /84 (BP Location: Left arm, Patient Position: Sitting, BP Method: Large (Automatic))   Pulse 98   Temp 97.9 °F (36.6 °C) (Oral)   Resp 20   Ht 5' 9" (1.753 m)   Wt 91.2 kg (201 lb)   SpO2 96%   BMI 29.68 kg/m²     Physical Exam    General Appearance: alert, pleasant, in no acute distress.  Skin: Skin color, texture, turgor normal. No rashes or lesions.  Eyes:  extraocular movement intact (EOMI), pupils equal, round, reactive to light and accommodation, conjunctiva clear.  ENT: No oral or nasal ulcers.  Neck:  Neck supple. No adenopathy.   Lungs: CTA throughout without crackles, rhonchi, or wheezes.   Heart: RRR w/o murmurs.  No edema.   Neuro: Alert, oriented, CN II-XII GI, sensory and motor innervation intact.  Musculoskeletal: No pain to bilateral MCPs, FROM noted to right wrist, left wrist limited, no pain, elbows and shoulders with no pain, + crepitus noted bilateral knees. No pain to bilateral MTPs. Pes planus noted to bilateral feet with R>L.    Psych: Alert, oriented, normal eye contact.    Labs:   11/22/2022: Hep B and C negative. HIV negative. Uric acid normal. ANCA negative. HLAB 27 positive. RPR and syphilis ab positive.  Chlamydia and gonorrhea PCR negative.  BASIL negative.  Rheumatoid factor and anti CCP negative.  EBV IgM negative, IgG positive.  Parvovirus B19 not detected.  EBV DNA not detected. Blood culture negative.     12/03/2022:  WBC count 17.7.  Hemoglobin 13.4.  Platelet count elevated 447.  ESR elevated 128, " normal less than 15.  Calcium slightly elevated 10.8.  Alk phos 172.  CMP okay.  ALT upper limit of normal.  CRP elevated 131.    23:  CBC, CMP okay.  ESR, CRP normal. Quantiferon tb negative.     3/27/23: CBC ok. ESR 21, . CMP ok.     23:  1+ protein and no blood in urine.   milligram/gram.  Urine chlamydia gonorrhea PCR negative.    2023 CMP Creat 1.22 , Calcium 10.6, otherwise ok. CRP 97.50 (previous 203.70, normal <5). CBC hgb 13.4. platelet 444 (previous 275), otherwise ok. Sed Rate 24 (<15). Uric Acid 7 (previous 4.9).     2023 CBC ok. CMP ok. Sed Rate 4 (<15), CRP 2.90 (<5). Uric Acid 4.5     2023: CMP okay.  Lupus anticoagulant not detected. Cardiolipin/phospholipid negative.  Beta 2 glycoprotein negative.  CBC okay.  Uric acid normal 4.7.     2023 CBC WBC 12.89, ANC 9.69 (strep), otherwise ok. CMP creat 1.20    Imagin22:  X-ray of left wrist showed possible small avulsion injury at the distal tip of the ulnar styloid process, no erosions.  X-ray of left ankle showed soft tissue swelling on lateral side.  Normal x-ray of right ankle.  Normal x-ray of right knee.    23:  Normal chest x-ray.  X-ray of right hand showed mild DJD.  X-ray of left hand showed DJD.     2022:  Echocardiogram showed EF 64%, normal ejection fraction.  Normal right ventricular function.  Normal left ventricular systolic and diastolic function.    2023: SI Joint Xray: Mild SI Joint sclerosis     23:  Mild DJD of left knee.  DJD changes seen in right knee.    Assessment:       ICD-10-CM ICD-9-CM   1. HLA-B27 positive arthropathy  M12.80 716.80   2. Rheumatoid arthritis, seronegative, multiple sites  M06.09 714.0   3. High risk medication use  Z79.899 V58.69   4. Chronic pain of right knee  M25.561 719.46    G89.29 338.29   5. Positive serology for syphilis  A53.0 097.1   6. Chronic midline low back pain with bilateral sciatica  M54.41 724.2    M54.42 724.3     G89.29 338.29   7. Pes planus of both feet  M21.41 734    M21.42    8. Chronic gout of multiple sites, unspecified cause  M1A.09X0 274.02   9. Drug-induced immunodeficiency  D84.821 279.3    Z79.899 E947.9         Plan:     1. HLA-B27 positive arthropathy  Diagnosed in 2022 after presentation to ED on 11/22/2022 with c/o of pain and swelling in left wrist, left hand, bilateral ankles and right knee. Started with pain and swelling in left wrist and progress to bilateral ankles and right knee.  Denies fevers or recent travel. History of bloody diarrhea in second week of 11/2022 in which he presented to ER for eval.  Negative RF and anti CCP. BASIL negative. HLA B27 Positive. Work up negative for EBV, Parvovirus, Chlamydia and Gonorrhea and blood cultures negative. No rashes, ECHO normal with no vegetations. Hep panel, HIV negative.  Started on MTX in November of 2022, Humira added in December of 2022- worked well until March of 2023 secondary failure. Limited ROM of left wrist, repeat X-ray Jan 2023 showed showed DJD. Started Cosentyx June 2023 and tolerating well, also continues MTX 8 tab po qweek with folic acid 1 mg po qd.  Today on exam, no active synovitis noted.     -Continue  MTX 20 mg po qweek and folic acid 1 mg daily.   -Continue Cosentyx 150 mg subq every 4 weeks. If needed can increase dose of Cosentyx.  -Infection w/u due  -Will also update bilateral foot Xrays today   -Lab today for continued monitoring       2. Rheumatoid arthritis, seronegative, multiple sites   -See above     3. High risk medication use/Drug Induced Immunodeficiency   -Advised to stay up-to-date on age appropriate vaccinations and malignancy screening with PCP.   -Persons with rheumatoid arthritis, lupus, psoriatic arthritis and other autoimmune diseases are at increased risk of cardiovascular disease including heart attack and stroke. We recommend that all patients with these conditions have annual health maintenance exams including  lipid measurements, blood pressure measurements, and smoking cessation counseling when applicable at their primary care provider's office.   -Continued lab monitoring.   -Reminded to hold meds of Cosentyx and MTX for any fever or infections, also monitor ETOH with MTX to 1-2 drinks a week.   -Defers flu vacine     4. Chronic pain of right knee and secondary DJD   -Secondary to infection he had as a kid and secondary arthritis.   -9/18/23:  Mild DJD of left knee.  DJD changes seen in right knee.      5. History of Syphilis  -Treated with PCN in September 2022 at [facility] unit.      6. 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 Unsure procedure- on September 2022 with Dr. Burgos- has continued to follow up  -Just recently completed PT which he reports has been very helpful      7. Pes Planus  -Bilateral feet with R>L- currently followed by Dr. Robb in [place] (podiatry)- recommended orthotics. May be contributing to some of his foot pain.   -Will obtain Xrays today      8. Gout  -Currently treated by his PCP as noted Uric acid elevated in May 2023 at 7  -Continue Allopurinol 100 mg po qd with PCP as directed.         No follow-ups on file. In addition to their scheduled follow up, the patient has also been instructed to follow up on as needed basis.     Total time spent with patient and documentation is 30 minutes. All questions were answered to patient's satisfaction and patient verbalized understanding.

## 2023-12-18 ENCOUNTER — HOSPITAL ENCOUNTER (OUTPATIENT)
Dept: RADIOLOGY | Facility: HOSPITAL | Age: 43
Discharge: HOME | End: 2023-12-18
Attending: NURSE PRACTITIONER
Payer: MEDICARE

## 2023-12-18 ENCOUNTER — OFFICE VISIT (OUTPATIENT)
Dept: RHEUMATOLOGY | Facility: CLINIC | Age: 43
End: 2023-12-18
Payer: MEDICARE

## 2023-12-18 VITALS
TEMPERATURE: 98 F | HEART RATE: 98 BPM | DIASTOLIC BLOOD PRESSURE: 84 MMHG | SYSTOLIC BLOOD PRESSURE: 133 MMHG | HEIGHT: 69 IN | BODY MASS INDEX: 29.77 KG/M2 | OXYGEN SATURATION: 96 % | WEIGHT: 201 LBS | RESPIRATION RATE: 20 BRPM

## 2023-12-18 DIAGNOSIS — M06.09 RHEUMATOID ARTHRITIS, SERONEGATIVE, MULTIPLE SITES: ICD-10-CM

## 2023-12-18 DIAGNOSIS — M21.41 PES PLANUS OF BOTH FEET: ICD-10-CM

## 2023-12-18 DIAGNOSIS — M21.42 PES PLANUS OF BOTH FEET: ICD-10-CM

## 2023-12-18 DIAGNOSIS — G89.29 CHRONIC PAIN OF RIGHT KNEE: ICD-10-CM

## 2023-12-18 DIAGNOSIS — M12.80 HLA-B27 POSITIVE ARTHROPATHY: Primary | ICD-10-CM

## 2023-12-18 DIAGNOSIS — G89.29 CHRONIC MIDLINE LOW BACK PAIN WITH BILATERAL SCIATICA: ICD-10-CM

## 2023-12-18 DIAGNOSIS — M54.41 CHRONIC MIDLINE LOW BACK PAIN WITH BILATERAL SCIATICA: ICD-10-CM

## 2023-12-18 DIAGNOSIS — M54.42 CHRONIC MIDLINE LOW BACK PAIN WITH BILATERAL SCIATICA: ICD-10-CM

## 2023-12-18 DIAGNOSIS — A53.0 POSITIVE SEROLOGY FOR SYPHILIS: ICD-10-CM

## 2023-12-18 DIAGNOSIS — M25.561 CHRONIC PAIN OF RIGHT KNEE: ICD-10-CM

## 2023-12-18 DIAGNOSIS — M1A.09X0 CHRONIC GOUT OF MULTIPLE SITES, UNSPECIFIED CAUSE: ICD-10-CM

## 2023-12-18 DIAGNOSIS — D84.821 DRUG-INDUCED IMMUNODEFICIENCY: ICD-10-CM

## 2023-12-18 DIAGNOSIS — Z79.899 DRUG-INDUCED IMMUNODEFICIENCY: ICD-10-CM

## 2023-12-18 DIAGNOSIS — Z79.899 HIGH RISK MEDICATION USE: ICD-10-CM

## 2023-12-18 PROCEDURE — 99215 OFFICE O/P EST HI 40 MIN: CPT | Mod: PBBFAC | Performed by: NURSE PRACTITIONER

## 2023-12-18 PROCEDURE — 1159F MED LIST DOCD IN RCRD: CPT | Mod: CPTII,,, | Performed by: NURSE PRACTITIONER

## 2023-12-18 PROCEDURE — 3008F PR BODY MASS INDEX (BMI) DOCUMENTED: ICD-10-PCS | Mod: CPTII,,, | Performed by: NURSE PRACTITIONER

## 2023-12-18 PROCEDURE — 73630 X-RAY EXAM OF FOOT: CPT | Mod: TC,LT

## 2023-12-18 PROCEDURE — 3075F SYST BP GE 130 - 139MM HG: CPT | Mod: CPTII,,, | Performed by: NURSE PRACTITIONER

## 2023-12-18 PROCEDURE — 73630 X-RAY EXAM OF FOOT: CPT | Mod: TC,RT

## 2023-12-18 PROCEDURE — 3079F PR MOST RECENT DIASTOLIC BLOOD PRESSURE 80-89 MM HG: ICD-10-PCS | Mod: CPTII,,, | Performed by: NURSE PRACTITIONER

## 2023-12-18 PROCEDURE — 3075F PR MOST RECENT SYSTOLIC BLOOD PRESS GE 130-139MM HG: ICD-10-PCS | Mod: CPTII,,, | Performed by: NURSE PRACTITIONER

## 2023-12-18 PROCEDURE — 99214 OFFICE O/P EST MOD 30 MIN: CPT | Mod: S$PBB,,, | Performed by: NURSE PRACTITIONER

## 2023-12-18 PROCEDURE — 3008F BODY MASS INDEX DOCD: CPT | Mod: CPTII,,, | Performed by: NURSE PRACTITIONER

## 2023-12-18 PROCEDURE — 99214 PR OFFICE/OUTPT VISIT, EST, LEVL IV, 30-39 MIN: ICD-10-PCS | Mod: S$PBB,,, | Performed by: NURSE PRACTITIONER

## 2023-12-18 PROCEDURE — 1160F RVW MEDS BY RX/DR IN RCRD: CPT | Mod: CPTII,,, | Performed by: NURSE PRACTITIONER

## 2023-12-18 PROCEDURE — 1159F PR MEDICATION LIST DOCUMENTED IN MEDICAL RECORD: ICD-10-PCS | Mod: CPTII,,, | Performed by: NURSE PRACTITIONER

## 2023-12-18 PROCEDURE — 3079F DIAST BP 80-89 MM HG: CPT | Mod: CPTII,,, | Performed by: NURSE PRACTITIONER

## 2023-12-18 PROCEDURE — 1160F PR REVIEW ALL MEDS BY PRESCRIBER/CLIN PHARMACIST DOCUMENTED: ICD-10-PCS | Mod: CPTII,,, | Performed by: NURSE PRACTITIONER

## 2023-12-18 RX ORDER — AMOXICILLIN 875 MG/1
875 TABLET, FILM COATED ORAL
COMMUNITY
End: 2023-12-18 | Stop reason: ALTCHOICE

## 2023-12-18 RX ORDER — FOLIC ACID 1 MG/1
1 TABLET ORAL DAILY
Qty: 30 TABLET | Refills: 6 | Status: SHIPPED | OUTPATIENT
Start: 2023-12-18 | End: 2024-03-17

## 2023-12-18 RX ORDER — METHOTREXATE 2.5 MG/1
TABLET ORAL
Qty: 40 TABLET | Refills: 3 | Status: SHIPPED | OUTPATIENT
Start: 2023-12-18 | End: 2024-02-23 | Stop reason: SDUPTHER

## 2023-12-18 RX ORDER — SECUKINUMAB 150 MG/ML
150 INJECTION SUBCUTANEOUS
Qty: 1 ML | Refills: 6 | Status: SHIPPED | OUTPATIENT
Start: 2023-12-18 | End: 2023-12-18 | Stop reason: SDUPTHER

## 2023-12-18 RX ORDER — SECUKINUMAB 150 MG/ML
150 INJECTION SUBCUTANEOUS
Qty: 1 ML | Refills: 6 | Status: ACTIVE | OUTPATIENT
Start: 2023-12-18 | End: 2024-03-14

## 2023-12-22 ENCOUNTER — TELEPHONE (OUTPATIENT)
Dept: RHEUMATOLOGY | Facility: CLINIC | Age: 43
End: 2023-12-22
Payer: MEDICARE

## 2023-12-22 NOTE — TELEPHONE ENCOUNTER
Called and spoke to pt informed him of message. Pt verbalized understanding            ----- Message from CATRACHITA Aguayo sent at 12/21/2023  1:18 PM CST -----  Please advise the patient that all lab are noted to be essentially WNL and clinically acceptable, infection workup is negative, please advise that his uric acid did increase from previous lab draw noted at 4.73 months ago and now 6.1.  His PCP is following and managing his uric acid, please make sure with fax a copy of these lab results over for their review and continued monitoring.  Right foot x-ray with no abnormalities noted, left foot x-ray shows some spurring at the Achilles tendon insertion site with a bunion noted, we will continue to monitor at future lab draws

## 2023-12-22 NOTE — TELEPHONE ENCOUNTER
I attempt to  call pt no answer unable to leave a voicemail message b/c no voicemail box set up          ----- Message from CATRACHITA Aguayo sent at 12/21/2023  1:18 PM CST -----  Please advise the patient that all lab are noted to be essentially WNL and clinically acceptable, infection workup is negative, please advise that his uric acid did increase from previous lab draw noted at 4.73 months ago and now 6.1.  His PCP is following and managing his uric acid, please make sure with fax a copy of these lab results over for their review and continued monitoring.  Right foot x-ray with no abnormalities noted, left foot x-ray shows some spurring at the Achilles tendon insertion site with a bunion noted, we will continue to monitor at future lab draws

## 2024-01-01 ENCOUNTER — PATIENT MESSAGE (OUTPATIENT)
Dept: RHEUMATOLOGY | Facility: CLINIC | Age: 44
End: 2024-01-01
Payer: MEDICARE

## 2024-01-01 ENCOUNTER — HOSPITAL ENCOUNTER (EMERGENCY)
Facility: HOSPITAL | Age: 44
Discharge: HOME | End: 2024-01-01
Attending: INTERNAL MEDICINE
Payer: MEDICARE

## 2024-01-01 VITALS
RESPIRATION RATE: 20 BRPM | DIASTOLIC BLOOD PRESSURE: 91 MMHG | HEART RATE: 72 BPM | OXYGEN SATURATION: 98 % | TEMPERATURE: 98 F | SYSTOLIC BLOOD PRESSURE: 135 MMHG

## 2024-01-01 DIAGNOSIS — J32.9 BACTERIAL SINUSITIS: ICD-10-CM

## 2024-01-01 DIAGNOSIS — B96.89 BACTERIAL SINUSITIS: ICD-10-CM

## 2024-01-01 DIAGNOSIS — J32.9 RECURRENT SINUS INFECTIONS: ICD-10-CM

## 2024-01-01 DIAGNOSIS — H65.91 RIGHT NON-SUPPURATIVE OTITIS MEDIA: Primary | ICD-10-CM

## 2024-01-01 PROCEDURE — 99284 EMERGENCY DEPT VISIT MOD MDM: CPT

## 2024-01-01 RX ORDER — AMOXICILLIN AND CLAVULANATE POTASSIUM 875; 125 MG/1; MG/1
1 TABLET, FILM COATED ORAL 2 TIMES DAILY
Qty: 20 TABLET | Refills: 0 | Status: SHIPPED | OUTPATIENT
Start: 2024-01-01 | End: 2024-01-11

## 2024-01-01 RX ORDER — CETIRIZINE HYDROCHLORIDE, PSEUDOEPHEDRINE HYDROCHLORIDE 5; 120 MG/1; MG/1
1 TABLET, FILM COATED, EXTENDED RELEASE ORAL DAILY
Qty: 10 TABLET | Refills: 0 | Status: SHIPPED | OUTPATIENT
Start: 2024-01-01 | End: 2024-01-11

## 2024-01-01 RX ORDER — METHYLPREDNISOLONE 4 MG/1
TABLET ORAL
Qty: 21 TABLET | Refills: 0 | Status: SHIPPED | OUTPATIENT
Start: 2024-01-01 | End: 2024-01-22

## 2024-01-01 RX ORDER — KETOROLAC TROMETHAMINE 10 MG/1
10 TABLET, FILM COATED ORAL EVERY 6 HOURS PRN
Qty: 20 TABLET | Refills: 0 | Status: SHIPPED | OUTPATIENT
Start: 2024-01-01

## 2024-01-01 NOTE — ED PROVIDER NOTES
Encounter Date: 1/1/2024       History     Chief Complaint   Patient presents with    right ear  pain     Presents with right ear ache for the last day. States having issues with his sinus, was prescribed Zithromax (Day 2). Denies fever.    The history is provided by the patient and a friend.     Review of patient's allergies indicates:  No Known Allergies  Past Medical History:   Diagnosis Date    Arthritis     Gout, unspecified     Other specified noninfective gastroenteritis and colitis     Syphilis, unspecified      Past Surgical History:   Procedure Laterality Date    SPINE SURGERY  09/19/2022     Family History   Problem Relation Age of Onset    Cancer Father     Rheum arthritis Paternal Aunt     Rheum arthritis Paternal Grandmother      Social History     Tobacco Use    Smoking status: Never    Smokeless tobacco: Never   Substance Use Topics    Alcohol use: Not Currently    Drug use: Never     Review of Systems   Constitutional:  Negative for fever.   HENT:  Positive for ear pain, sinus pressure and sinus pain. Negative for sore throat.    Respiratory:  Negative for shortness of breath.    Cardiovascular:  Negative for chest pain.   Gastrointestinal:  Negative for nausea.   Genitourinary:  Negative for dysuria.   Musculoskeletal:  Negative for back pain.   Skin:  Negative for rash.   Neurological:  Negative for weakness.   Hematological:  Does not bruise/bleed easily.   All other systems reviewed and are negative.      Physical Exam     Initial Vitals [01/01/24 0148]   BP Pulse Resp Temp SpO2   (!) 135/91 72 20 97.5 °F (36.4 °C) 98 %      MAP       --         Physical Exam    Nursing note and vitals reviewed.  Constitutional: He appears well-developed and well-nourished. No distress.   HENT:   Head: Normocephalic and atraumatic.   Left Ear: External ear normal.   Nose: Nose normal.   Mouth/Throat: Oropharynx is clear and moist. No oropharyngeal exudate.   Rt TM opaque and bulging  Moderate congestion   Eyes:  Conjunctivae and EOM are normal. Pupils are equal, round, and reactive to light.   Neck: Neck supple. No JVD present.   Normal range of motion.  Cardiovascular:  Regular rhythm, normal heart sounds and intact distal pulses.           Pulmonary/Chest: Breath sounds normal. No respiratory distress.   Musculoskeletal:         General: No edema. Normal range of motion.      Cervical back: Normal range of motion and neck supple.     Lymphadenopathy:     He has no cervical adenopathy.   Neurological: He is alert and oriented to person, place, and time. He has normal strength.   Skin: Skin is warm and dry. No rash noted.   Psychiatric: His behavior is normal.         ED Course   Procedures  Labs Reviewed - No data to display       Imaging Results    None          Medications - No data to display  Medical Decision Making    Additional MDM:   Differential Diagnosis:   Otitis media, Otitis Externa, Pharyngitis, cerumen impaction, ear effusion, sinusitis, among others                                      Clinical Impression:  Final diagnoses:  [H65.91] Right non-suppurative otitis media (Primary)  [J32.9, B96.89] Bacterial sinusitis          ED Disposition Condition    Discharge Stable          ED Prescriptions       Medication Sig Dispense Start Date End Date Auth. Provider    amoxicillin-clavulanate 875-125mg (AUGMENTIN) 875-125 mg per tablet Take 1 tablet by mouth 2 (two) times daily. for 10 days 20 tablet 1/1/2024 1/11/2024 Patrick Galicia MD    methylPREDNISolone (MEDROL DOSEPACK) 4 mg tablet Follow instructions on package 21 tablet 1/1/2024 1/22/2024 Patrick Galicia MD    ketorolac (TORADOL) 10 mg tablet Take 1 tablet (10 mg total) by mouth every 6 (six) hours as needed for Pain. 20 tablet 1/1/2024 -- Patrick Galicia MD    cetirizine-pseudoephedrine 5-120 mg Tb12 Take 1 tablet by mouth once daily. for 10 days 10 tablet 1/1/2024 1/11/2024 Patrick Galicia MD           Follow-up Information       Follow up With Specialties Details Why Contact Info    Prisca Contreras MD Internal Medicine Schedule an appointment as soon as possible for a visit in 2 weeks  [address] [facility] [place] [address]  [phone]      [facility] Emergency Medicine  If symptoms worsen [address] [facility] [id number] [phone]             Patrick Galicia MD  01/01/24 0153

## 2024-01-02 ENCOUNTER — TELEPHONE (OUTPATIENT)
Dept: RHEUMATOLOGY | Facility: CLINIC | Age: 44
End: 2024-01-02
Payer: MEDICARE

## 2024-01-02 NOTE — TELEPHONE ENCOUNTER
Called and spoke to pt  that he would have to be referred to ENT clinic Instructed him to call his PCP Prisca Contreras MD ask her for referral . Pt verbalized understafnding

## 2024-01-23 ENCOUNTER — PATIENT MESSAGE (OUTPATIENT)
Dept: RHEUMATOLOGY | Facility: CLINIC | Age: 44
End: 2024-01-23
Payer: MEDICARE

## 2024-01-24 ENCOUNTER — PATIENT MESSAGE (OUTPATIENT)
Dept: RHEUMATOLOGY | Facility: CLINIC | Age: 44
End: 2024-01-24
Payer: MEDICARE

## 2024-01-26 ENCOUNTER — OFFICE VISIT (OUTPATIENT)
Dept: OTOLARYNGOLOGY | Facility: CLINIC | Age: 44
End: 2024-01-26
Payer: MEDICARE

## 2024-01-26 DIAGNOSIS — J32.9 RECURRENT SINUS INFECTIONS: ICD-10-CM

## 2024-01-26 DIAGNOSIS — H68.009 EUSTACHIAN CATARRH, UNSPECIFIED LATERALITY: Primary | ICD-10-CM

## 2024-01-26 DIAGNOSIS — M05.671: ICD-10-CM

## 2024-01-26 RX ORDER — FLUTICASONE PROPIONATE 50 MCG
2 SPRAY, SUSPENSION (ML) NASAL DAILY
Qty: 15.8 ML | Refills: 0 | Status: SHIPPED | OUTPATIENT
Start: 2024-01-26 | End: 2024-02-14 | Stop reason: SDUPTHER

## 2024-01-26 NOTE — PROGRESS NOTES
Established Patient - Audio Only Telehealth Visit     The patient location is:  Home  The chief complaint leading to consultation is:  Sinus and ear trouble  Visit type: Virtual visit with audio only (telephone)  Total time spent with patient:  20 minutes spent reviewing chart, reviewing past history and medical problems as well as medications plus discussion of current problem       The reason for the audio only service rather than synchronous audio and video virtual visit was related to technical difficulties or patient preference/necessity.     Each patient to whom I provide medical services by telemedicine is:  (1) informed of the relationship between the physician and patient and the respective role of any other health care provider with respect to management of the patient; and (2) notified that they may decline to receive medical services by telemedicine and may withdraw from such care at any time. Patient verbally consented to receive this service via voice-only telephone call.       HPI:  Patient complains of recurrent acute sinus infections.  Averages about 2 a year.  Currently on antibiotics and steroids from primary care.  Not on nasal steroids.  Does have springtime allergy symptomatology.  Complains of ears being full, muffled, sometimes sound is aggravating, has some pressure, no vertigo.  No recent audiograms.  Problems tend to clear up in the off season without any significant sequela.  Complicating his history is his comorbidities including autoimmune issues.  Winter and spring are his worst times, seems to be well in between seasons.  No prior history of ear surgery.    Assessment and plan:    Eustachian tube dysfunction   Recurrent acute sinusitis   Seasonal allergic rhinitis  Conductive hearing loss   Rheumatoid autoimmune disease    Use Claritin or Zyrtec p.r.n.   Start Flonase daily, proper technique described   Saline irrigations b.i.d.  Schedule audiogram and follow up  afterwards.                     This service was not originating from a related E/M service provided within the previous 7 days nor will  to an E/M service or procedure within the next 24 hours or my soonest available appointment.  Prevailing standard of care was able to be met in this audio-only visit.

## 2024-02-05 ENCOUNTER — CLINICAL SUPPORT (OUTPATIENT)
Dept: AUDIOLOGY | Facility: HOSPITAL | Age: 44
End: 2024-02-05
Payer: MEDICARE

## 2024-02-05 DIAGNOSIS — H68.009 EUSTACHIAN CATARRH, UNSPECIFIED LATERALITY: ICD-10-CM

## 2024-02-05 DIAGNOSIS — J32.9 RECURRENT SINUS INFECTIONS: ICD-10-CM

## 2024-02-05 DIAGNOSIS — M05.671: ICD-10-CM

## 2024-02-05 DIAGNOSIS — H91.90 HEARING DISORDER, UNSPECIFIED LATERALITY: Primary | ICD-10-CM

## 2024-02-05 PROCEDURE — 92567 TYMPANOMETRY: CPT | Performed by: AUDIOLOGIST

## 2024-02-05 PROCEDURE — 92557 COMPREHENSIVE HEARING TEST: CPT | Performed by: AUDIOLOGIST

## 2024-02-05 NOTE — PROGRESS NOTES
"      Hearing Evaluation      Patient History: Patient in for a hearing evaluation due to "muffled hearing" secondary to suspected ETD. He presents with no complaints today stating his issue has resolved. Tinnitus, vertigo, and middle ear issues are denied. All additional history is unremarkable.      Test Results:       Pure Tone Testing:     Right ear:   Normal peripheral hearing sensitivity from 250-8kHz. Speech reception threshold is in agreement with puretone findings.  Discrimination score of 100% is considered excellent.      Left ear: Normal peripheral hearing sensitivity from 250-8kHz. Speech reception threshold is in agreement with puretone findings.  Discrimination score of 100T is considered excellent.         Tympanometry:        Right ear:   Type 'A' tymp, normal middle ear pressure/function    Left ear: Type 'A' tymp, normal middle ear pressure/function       Distortion Product Otoacoustic Emission Testing (DPOAE):         Right ear: Present emissions from 1k-6kHz      Left ear: Present emissions from 1k-6kHz         Interpretations:     Behavioral test findings indicate hearing within normal limits, bilaterally. Speech reception thresholds obtained at 10dB, AU, and are in agreement with puretone findings. Speech discrimination scores of 100%, AU, are considered excellent.  DPOAE findings indicate normal cochlear outer hair cell function, bilaterally. Immittance measures indicate normal middle ear pressure/function, bilaterally.         Recommendations:    RTC PRN with audiology    "

## 2024-02-09 NOTE — PROGRESS NOTES
[facility]  Otolaryngology Clinic Note    Trey Harper  YOB: 1980    Chief Complaint:   Chief Complaint   Patient presents with    Follow-up        HPI: 1/26/24: Patient complains of recurrent acute sinus infections. Averages about 2 a year. Currently on antibiotics and steroids from primary care. Not on nasal steroids. Does have springtime allergy symptomatology. Complains of ears being full, muffled, sometimes sound is aggravating, has some pressure, no vertigo. No recent audiograms. Problems tend to clear up in the off season without any significant sequela. Complicating his history is his comorbidities including autoimmune issues. Winter and spring are his worst times, seems to be well in between seasons. No prior history of ear surgery.     02/14/2024: 44 y.o. male returns for f/u. Reports the flonase has been helping and he feels that everything is clearing up. He has been using it once daily & has not done any saline sinus rinses. He is very concerned that he has difficulty hearing when he gets sinus infections and states his PCP has told him he has very bad sinuses. Denies frequent nasal congestion or facial pressure. States he blows his nose a lot and sometimes sees blood streaked mucous, especially when the air is dry.      ROS:   10-point review of systems negative except per HPI      Review of patient's allergies indicates:  No Known Allergies    Past Medical History:   Diagnosis Date    Arthritis     Gout, unspecified     Other specified noninfective gastroenteritis and colitis     Syphilis, unspecified        Past Surgical History:   Procedure Laterality Date    SPINE SURGERY  09/19/2022       Social History     Socioeconomic History    Marital status: Single   Tobacco Use    Smoking status: Never    Smokeless tobacco: Never   Substance and Sexual Activity    Alcohol use: Not Currently    Drug use: Never    Sexual activity: Not Currently     Partners: Male        Family History   Problem Relation Age of Onset    Cancer Father     Rheum arthritis Paternal Aunt     Rheum arthritis Paternal Grandmother        Outpatient Encounter Medications as of 2024   Medication Sig Dispense Refill    ALCOHOL PREP PADS PadM SMARTSIG:Pledget(s) Topical      allopurinoL (ZYLOPRIM) 100 MG tablet Take 100 mg by mouth Daily.      fluticasone propionate (FLONASE) 50 mcg/actuation nasal spray 2 sprays (100 mcg total) by Each Nostril route once daily. 15.8 mL 0    folic acid (FOLVITE) 1 MG tablet Take 1 tablet (1 mg total) by mouth once daily. 30 tablet 6    ketorolac (TORADOL) 10 mg tablet Take 1 tablet (10 mg total) by mouth every 6 (six) hours as needed for Pain. 20 tablet 0    meloxicam (MOBIC) 7.5 MG tablet Take 7.5 mg by mouth once daily.      methotrexate 2.5 MG Tab Take 4 tablets on Friday morning and 4 tablets on Friday night, once a week. Hold for infection or fever. 40 tablet 3    PROTONIX 40 mg tablet Take 40 mg by mouth once daily.      SHARPS CONTAINER use as directed      sildenafiL (VIAGRA) 100 MG tablet Take 100 mg by mouth daily as needed for Erectile Dysfunction.      [] secukinumab (COSENTYX PEN) 150 mg/mL PnIj Inject 1 pen (150 mg) into the skin every 28 days. 1 mL 6     No facility-administered encounter medications on file as of 2024.       Physical Exam:  Vitals:    24 0910   BP: 132/88   BP Location: Right arm   Patient Position: Sitting   BP Method: Large (Automatic)   Pulse: 67   Temp: 98.3 °F (36.8 °C)   TempSrc: Oral       Physical Exam   General: NAD, voice normal  Neuro: AAO, CN II - XII grossly intact  Head/ Face: NCAT, symmetric, sensations intact bilaterally  Eyes: EOMI, PERRL  Ears: externally normal with grossly normal hearing  AD: EAC patent, TM intact, no middle ear effusion, no retractions  AS: EAC patent, TM intact, no middle ear effusion, no retractions  Nose: bilateral nares patent, midline septum, no rhinorrhea, no external  deformity, no turbinate hypertrophy  OC/OP: MMM, no intraoral lesions, FOM/BOT soft, no trismus, dentition is moderate, no uvular deviation, bilaterally symmetric soft palate elevation, palatoglossus and palatopharyngeal fold wnl; tonsils are symmetric and 1+  Indirect laryngoscopy: deferred due to patient intolerance  Neck: soft, supple, no LAD, normal ROM, no thyromegaly  Respiratory: nonlabored, no wheezing, bilateral chest rise  Cardiovascular: RRR  Gastrointestinal: S NT ND  Skin: warm, no lesions  Musculoskeletal: 5/5 strength  Psych: Appropriate affect/mood     Pertinent Data:  ? LABS:  ? AUDIO:                   ? PATH:      Imaging:   I personally reviewed the following images:        Assessment/Plan:  44 y.o. male with AR and transient ETD. CT neck 11/2023 unremarkable for sinus dz, but there is turbinate hypertrophy & right septal spur. Audio WNL, type A tymps- reviewed. Offered addition of astelin but pt would like to hold off at this time; feels flonase is working well. Reinforced importance of consistent use of NSI + flonase.  - NSI BID  - Flonase BID  - Claritin or Zyrtec p.r.n.   - RTC 6mo    Yenni Clark NP

## 2024-02-14 ENCOUNTER — OFFICE VISIT (OUTPATIENT)
Dept: OTOLARYNGOLOGY | Facility: CLINIC | Age: 44
End: 2024-02-14
Payer: MEDICARE

## 2024-02-14 VITALS — HEART RATE: 67 BPM | DIASTOLIC BLOOD PRESSURE: 88 MMHG | SYSTOLIC BLOOD PRESSURE: 132 MMHG | TEMPERATURE: 98 F

## 2024-02-14 DIAGNOSIS — J30.9 ALLERGIC RHINITIS, UNSPECIFIED SEASONALITY, UNSPECIFIED TRIGGER: Primary | ICD-10-CM

## 2024-02-14 DIAGNOSIS — H69.90 DYSFUNCTION OF EUSTACHIAN TUBE, UNSPECIFIED LATERALITY: ICD-10-CM

## 2024-02-14 PROCEDURE — 99213 OFFICE O/P EST LOW 20 MIN: CPT | Mod: PBBFAC | Performed by: NURSE PRACTITIONER

## 2024-02-14 PROCEDURE — 99214 OFFICE O/P EST MOD 30 MIN: CPT | Mod: S$PBB,,, | Performed by: NURSE PRACTITIONER

## 2024-02-14 RX ORDER — FLUTICASONE PROPIONATE 50 MCG
2 SPRAY, SUSPENSION (ML) NASAL 2 TIMES DAILY
Qty: 16 G | Refills: 11 | Status: SHIPPED | OUTPATIENT
Start: 2024-02-14 | End: 2024-06-12 | Stop reason: ALTCHOICE

## 2024-02-23 DIAGNOSIS — M12.80 HLA-B27 POSITIVE ARTHROPATHY: ICD-10-CM

## 2024-02-23 DIAGNOSIS — M06.09 RHEUMATOID ARTHRITIS, SERONEGATIVE, MULTIPLE SITES: ICD-10-CM

## 2024-02-23 DIAGNOSIS — Z79.899 HIGH RISK MEDICATION USE: ICD-10-CM

## 2024-02-23 RX ORDER — METHOTREXATE 2.5 MG/1
TABLET ORAL
Qty: 40 TABLET | Refills: 2 | Status: SHIPPED | OUTPATIENT
Start: 2024-02-23 | End: 2024-03-26 | Stop reason: SDUPTHER

## 2024-03-19 ENCOUNTER — PATIENT MESSAGE (OUTPATIENT)
Dept: RHEUMATOLOGY | Facility: CLINIC | Age: 44
End: 2024-03-19
Payer: MEDICARE

## 2024-03-26 ENCOUNTER — LAB VISIT (OUTPATIENT)
Dept: LAB | Facility: HOSPITAL | Age: 44
End: 2024-03-26
Attending: NURSE PRACTITIONER
Payer: MEDICARE

## 2024-03-26 ENCOUNTER — PATIENT MESSAGE (OUTPATIENT)
Dept: RHEUMATOLOGY | Facility: CLINIC | Age: 44
End: 2024-03-26
Payer: MEDICARE

## 2024-03-26 DIAGNOSIS — M06.09 RHEUMATOID ARTHRITIS, SERONEGATIVE, MULTIPLE SITES: ICD-10-CM

## 2024-03-26 DIAGNOSIS — M06.09 RHEUMATOID ARTHRITIS, SERONEGATIVE, MULTIPLE SITES: Primary | ICD-10-CM

## 2024-03-26 DIAGNOSIS — M12.80 HLA-B27 POSITIVE ARTHROPATHY: ICD-10-CM

## 2024-03-26 DIAGNOSIS — Z79.899 HIGH RISK MEDICATION USE: ICD-10-CM

## 2024-03-26 LAB
ALBUMIN SERPL-MCNC: 4 G/DL (ref 3.5–5)
ALBUMIN/GLOB SERPL: 1.4 RATIO (ref 1.1–2)
ALP SERPL-CCNC: 97 UNIT/L (ref 40–150)
ALT SERPL-CCNC: 54 UNIT/L (ref 0–55)
AST SERPL-CCNC: 27 UNIT/L (ref 5–34)
BASOPHILS # BLD AUTO: 0.04 X10(3)/MCL
BASOPHILS NFR BLD AUTO: 0.5 %
BILIRUB SERPL-MCNC: 0.4 MG/DL
BUN SERPL-MCNC: 12.4 MG/DL (ref 8.9–20.6)
CALCIUM SERPL-MCNC: 9.4 MG/DL (ref 8.4–10.2)
CHLORIDE SERPL-SCNC: 107 MMOL/L (ref 98–107)
CO2 SERPL-SCNC: 26 MMOL/L (ref 22–29)
CREAT SERPL-MCNC: 1.07 MG/DL (ref 0.73–1.18)
EOSINOPHIL # BLD AUTO: 0.18 X10(3)/MCL (ref 0–0.9)
EOSINOPHIL NFR BLD AUTO: 2.1 %
ERYTHROCYTE [DISTWIDTH] IN BLOOD BY AUTOMATED COUNT: 14.9 % (ref 11.5–17)
GFR SERPLBLD CREATININE-BSD FMLA CKD-EPI: >60 MLS/MIN/1.73/M2
GLOBULIN SER-MCNC: 2.8 GM/DL (ref 2.4–3.5)
GLUCOSE SERPL-MCNC: 109 MG/DL (ref 74–100)
HCT VFR BLD AUTO: 41.9 % (ref 42–52)
HGB BLD-MCNC: 14.9 G/DL (ref 14–18)
IMM GRANULOCYTES # BLD AUTO: 0.02 X10(3)/MCL (ref 0–0.04)
IMM GRANULOCYTES NFR BLD AUTO: 0.2 %
LYMPHOCYTES # BLD AUTO: 3.09 X10(3)/MCL (ref 0.6–4.6)
LYMPHOCYTES NFR BLD AUTO: 36.1 %
MCH RBC QN AUTO: 30.3 PG (ref 27–31)
MCHC RBC AUTO-ENTMCNC: 35.6 G/DL (ref 33–36)
MCV RBC AUTO: 85.2 FL (ref 80–94)
MONOCYTES # BLD AUTO: 0.6 X10(3)/MCL (ref 0.1–1.3)
MONOCYTES NFR BLD AUTO: 7 %
NEUTROPHILS # BLD AUTO: 4.63 X10(3)/MCL (ref 2.1–9.2)
NEUTROPHILS NFR BLD AUTO: 54.1 %
NRBC BLD AUTO-RTO: 0 %
PLATELET # BLD AUTO: 176 X10(3)/MCL (ref 130–400)
PMV BLD AUTO: 10.4 FL (ref 7.4–10.4)
POTASSIUM SERPL-SCNC: 3.9 MMOL/L (ref 3.5–5.1)
PROT SERPL-MCNC: 6.8 GM/DL (ref 6.4–8.3)
RBC # BLD AUTO: 4.92 X10(6)/MCL (ref 4.7–6.1)
SODIUM SERPL-SCNC: 141 MMOL/L (ref 136–145)
WBC # SPEC AUTO: 8.56 X10(3)/MCL (ref 4.5–11.5)

## 2024-03-26 PROCEDURE — 85025 COMPLETE CBC W/AUTO DIFF WBC: CPT

## 2024-03-26 PROCEDURE — 36415 COLL VENOUS BLD VENIPUNCTURE: CPT

## 2024-03-26 PROCEDURE — 80053 COMPREHEN METABOLIC PANEL: CPT

## 2024-03-26 NOTE — TELEPHONE ENCOUNTER
Called patient to notify of new lab orders placed today. Explained that once we receive lab results, we can refill MTX. He said he will try to go either today or tomorrow.

## 2024-03-27 RX ORDER — METHOTREXATE 2.5 MG/1
TABLET ORAL
Qty: 40 TABLET | Refills: 2 | Status: SHIPPED | OUTPATIENT
Start: 2024-03-27 | End: 2024-05-03 | Stop reason: SDUPTHER

## 2024-03-27 NOTE — TELEPHONE ENCOUNTER
SPOKE TO PT INFORMED MESSAGE AND PRESCRIPTION FOR  MTX WAS REFILLED.PT VERBALIZED UNDERSTANDING              I attempt to call pt no answer left a voicemail message to call clinic back regarding lab results            ----- Message from CATRACHITA Aguayo sent at 3/27/2024  7:50 AM CDT -----  Please advise the patient that all lab are noted to be clinically acceptable, we will continue to monitor at future lab draws

## 2024-05-03 DIAGNOSIS — M06.09 RHEUMATOID ARTHRITIS, SERONEGATIVE, MULTIPLE SITES: ICD-10-CM

## 2024-05-03 DIAGNOSIS — Z79.899 HIGH RISK MEDICATION USE: ICD-10-CM

## 2024-05-03 DIAGNOSIS — M12.80 HLA-B27 POSITIVE ARTHROPATHY: ICD-10-CM

## 2024-05-03 RX ORDER — METHOTREXATE 2.5 MG/1
TABLET ORAL
Qty: 40 TABLET | Refills: 2 | Status: SHIPPED | OUTPATIENT
Start: 2024-05-03 | End: 2024-06-12 | Stop reason: SDUPTHER

## 2024-05-03 NOTE — TELEPHONE ENCOUNTER
Please see the attached refill request. Last visit 12/18/24, labs completed on 3/26/24, return to clinic 6/18/24

## 2024-06-07 RX ORDER — SECUKINUMAB 150 MG/ML
INJECTION SUBCUTANEOUS
COMMUNITY
Start: 2024-04-29

## 2024-06-07 RX ORDER — NAPROXEN 500 MG/1
500 TABLET ORAL 2 TIMES DAILY
COMMUNITY
Start: 2024-01-24 | End: 2024-06-12 | Stop reason: ALTCHOICE

## 2024-06-07 RX ORDER — DICLOFENAC SODIUM 10 MG/G
2 GEL TOPICAL 4 TIMES DAILY
COMMUNITY
Start: 2023-12-28 | End: 2024-06-12 | Stop reason: ALTCHOICE

## 2024-06-11 NOTE — PROGRESS NOTES
Patient ID: [id number]     Chief Complaint: Joint Pain (Right elbow for the last 2 months)    HPI:     Trey Harper is a 44 y.o. male here today for follow up of spondyloarthritis, +HLA-B27.      Presents today for a follow up of +HLA-B27 Spondyloarthritis/reactive arthritis diagnosed in 2022 after presentation to ED on 11/22/2022 with c/o of pain and swelling in left wrist, left hand, bilateral ankles and right knee.  History of bloody diarrhea few weeks prior to that presentation.  He was treated with Ciprofloxacin and Flagyl at the time. History of septic arthritis in right knee when he was 5 to 6 years old. He previously tested positive for Syphilis and was treated with Penicillin in 9/2022.  He was initially started on Humira but developed secondary failure.      He has seen Podiatry in the past and was told he needed braces for his ankles, followed by Dr. Garvin (podiatrist) in [place]. He also reports knee pain for last 4-5 months, worse with activities, his PCP planning to refer him to Ortho for knee pain. He was also started on Allopurinol 100 mg po qd by his PCP. Continues to have intermittent back pain, worse when walking and standing on feet. Reports back pain at night, gas a hard time falling asleep due to pain and getting comfortable, when he does fall asleep he states it is very short lived due to pain waking him up.     He has continued to have issues with his feet, mainly with walking, recently visited the [employer] and had some inserts made so hoping this helps. Has also seen Podiatry in the past and was told he needed braces for his ankles, followed by Dr. Garvin (podiatrist) in [place]- he has tried to wear but has been uncomfortable so admits not wearing as directed.     June 2024: Here today for follow up. Currently taking Methotrexate 20 mg per week with folic acid 1 mg daily and Cosentyx 150 mg subQ every 4 weeks. He denies any red/warm/swollen joints, morning stiffness  lasting roughly 15-20 minutes. His feet continue to bother him at times, he was seeing C[facility] in the past but has not seen him in a while, last discussed Orthotics and does have but has issues with blisters when uses. Back pain is not bothering him as much now, will have some aches and pains here and there but for the most part has been much better with meds. He has also been having right elbow pain for the past 2 months, comes and goes, denies injury. Reports when he bends his elbow he can feel pain, feels pain is deep to medial aspect, he is right hand dominant. He has not taken anything for pain.    Denies any recent illness or hospitalizations since last visit.     Dr. Robb in [place]- Podiatry  Dr. Burgos- Neurospinal  Dr. Olea- Ophthalmology     [facility]: GERD and chronic right knee pain.  Denies fevers, rashes, oral and nasal ulcers, history of DVT or PE, history of stroke or seizures, history of MI, history of malignancies, Raynaud's phenomenon, history of inflammatory eye diseases, history of inflammatory bowel disease.  Family history of autoimmune disease: Pat Aunt with RA.   Smoking: none     Social History     Tobacco Use   Smoking Status Never   Smokeless Tobacco Never          -------------------------------------    Arthritis    Gout, unspecified    Other specified noninfective gastroenteritis and colitis    Syphilis, unspecified        Past Surgical History:   Procedure Laterality Date    SPINE SURGERY  09/19/2022       Review of patient's allergies indicates:  No Known Allergies    Outpatient Medications Marked as Taking for the 6/12/24 encounter (Office Visit) with Angeles Johns FNP   Medication Sig Dispense Refill    ALCOHOL PREP PADS PadM SMARTSIG:Pledget(s) Topical      allopurinoL (ZYLOPRIM) 100 MG tablet Take 100 mg by mouth Daily.      COSENTYX  mg/mL PnIj Inject into the skin every 7 days.      folic acid (FOLVITE) 1 MG tablet Take 1 tablet (1 mg total) by mouth once  daily. 30 tablet 6    meloxicam (MOBIC) 7.5 MG tablet Take 7.5 mg by mouth once daily.      methotrexate 2.5 MG Tab Take 4 tablets on Friday morning and 4 tablets on Friday night, once a week. Hold for infection or fever. 40 tablet 2    PROTONIX 40 mg tablet Take 40 mg by mouth once daily.      SHARPS CONTAINER use as directed      sildenafiL (VIAGRA) 100 MG tablet Take 100 mg by mouth daily as needed for Erectile Dysfunction.         Social History     Socioeconomic History    Marital status: Single   Tobacco Use    Smoking status: Never    Smokeless tobacco: Never   Substance and Sexual Activity    Alcohol use: Not Currently    Drug use: Never    Sexual activity: Not Currently     Partners: Male     Birth control/protection: None        Family History   Problem Relation Name Age of Onset    Cancer Father Esophagus cancer     Rheum arthritis Paternal Aunt      Rheum arthritis Paternal Grandmother          Immunization History   Administered Date(s) Administered    DT (Pediatric) 12/17/1984, 09/30/1994    DTP 1980, 1980, 1980, 10/22/1981    MMR 06/25/1981    OPV 1980, 1980, 1980, 10/22/1981       Patient Care Team:  Prisca Contreras MD as PCP - General (Internal Medicine)     Subjective:     ROS    Constitutional:  Denies chills. Denies fever. Denies night sweats. Denies weight loss.   Ophthalmology: Denies blurred vision. Denies dry eyes. Denies eye pain. Denies Itching and redness.   ENT: Denies oral ulcers. Denies epistaxis. Denies dry mouth. Denies swollen glands.   Endocrine: Denies diabetes. Denies thyroid Problems.   Respiratory: Denies cough. Denies shortness of breath. Denies shortness of breath with exertion. Denies hemoptysis.   Cardiovascular: Denies chest pain at rest. Denies chest pain with exertion. Denies palpitations.    Gastrointestinal: Denies abdominal pain. Denies diarrhea. Denies nausea. Denies vomiting. Denies hematemesis or hematochezia. Denies  "heartburn.  Genitourinary: Denies blood in urine.  Musculoskeletal: See HPI for details  Integumentary: Denies rash. Denies photosensitivity.   Peripheral Vascular: Denies Ulcers of hands and/or feet. Denies Cold extremities.   Neurologic: Denies dizziness. Denies headache.  Denies loss of strength. Denies numbness or tingling.   Psychiatric: Denies depression and anxiety- doing much better since he is feeling better. Denies suicidal/homicidal ideations.      Objective:     /87 (BP Location: Left arm, Patient Position: Sitting, BP Method: Medium (Automatic))   Pulse 85   Temp 98.3 °F (36.8 °C) (Oral)   Resp 16   Ht 5' 9" (1.753 m)   Wt 94.1 kg (207 lb 7.3 oz)   SpO2 98%   BMI 30.64 kg/m²     Physical Exam    General Appearance: alert, pleasant, in no acute distress.  Skin: Skin color, texture, turgor normal. No rashes or lesions.  Eyes:  extraocular movement intact (EOMI), pupils equal, round, reactive to light and accommodation, conjunctiva clear.  ENT: No oral or nasal ulcers.  Neck:  Neck supple. No adenopathy.   Lungs: CTA throughout without crackles, rhonchi, or wheezes.   Heart: RRR w/o murmurs.  No edema.   Neuro: Alert, oriented, CN II-XII GI, sensory and motor innervation intact.  Musculoskeletal: No pain to bilateral MCPs, FROM noted to wrists and shoulders with no pain, medial epicondyle tenderness to right elbow, left with no pain, FROM noted bilaterally, + crepitus noted bilateral knees. No pain to bilateral MTPs. Pes planus noted to bilateral feet with R>L.    Psych: Alert, oriented, normal eye contact.    Labs:   11/22/2022: Hep B and C negative. HIV negative. Uric acid normal. ANCA negative. HLAB 27 positive. RPR and syphilis ab positive.  Chlamydia and gonorrhea PCR negative.  BASIL negative.  Rheumatoid factor and anti CCP negative.  EBV IgM negative, IgG positive.  Parvovirus B19 not detected.  EBV DNA not detected. Blood culture negative.     12/03/2022:  WBC count 17.7.  Hemoglobin " 13.4.  Platelet count elevated 447.  ESR elevated 128, normal less than 15.  Calcium slightly elevated 10.8.  Alk phos 172.  CMP okay.  ALT upper limit of normal.  CRP elevated 131.    23:  CBC, CMP okay.  ESR, CRP normal. Quantiferon tb negative.     3/27/23: CBC ok. ESR 21, . CMP ok.     23:  1+ protein and no blood in urine.   milligram/gram.  Urine chlamydia gonorrhea PCR negative.    2023 CMP Creat 1.22 , Calcium 10.6, otherwise ok. CRP 97.50 (previous 203.70, normal <5). CBC hgb 13.4. platelet 444 (previous 275), otherwise ok. Sed Rate 24 (<15). Uric Acid 7 (previous 4.9).     2023 CBC ok. CMP ok. Sed Rate 4 (<15), CRP 2.90 (<5). Uric Acid 4.5     2023: CMP okay.  Lupus anticoagulant not detected. Cardiolipin/phospholipid negative.  Beta 2 glycoprotein negative.  CBC okay.  Uric acid normal 4.7.     2023 CBC WBC 12.89, ANC 9.69 (strep), otherwise ok. CMP creat 1.20    2023 CRP 6.80 (<5), Hep B/C/HIV negative, Uric Acid 6.1, Sed Rate 10 (<15), CRP 6.80 (<5), Quantiferon negative    3/26/2024 CMP ok. CBC ok.     Imagin22:  X-ray of left wrist showed possible small avulsion injury at the distal tip of the ulnar styloid process, no erosions.  X-ray of left ankle showed soft tissue swelling on lateral side.  Normal x-ray of right ankle.  Normal x-ray of right knee.    23:  Normal chest x-ray.  X-ray of right hand showed mild DJD.  X-ray of left hand showed DJD.     2022:  Echocardiogram showed EF 64%, normal ejection fraction.  Normal right ventricular function.  Normal left ventricular systolic and diastolic function.    2023: SI Joint Xray: Mild SI Joint sclerosis     9/18/23:  Mild DJD of left knee.  DJD changes seen in right knee.    2023 Right Foot Xray: Impression: No acute abnormalities are seen  Left Foot Xray: There is spurring of the insertion of the Achilles tendon.  There are changes suspicious for an early primary hallux  valgus. Impression: Changes consistent with primary hallux valgus.    Assessment:       ICD-10-CM ICD-9-CM   1. HLA-B27 positive arthropathy  M12.80 716.80   2. Rheumatoid arthritis, seronegative, multiple sites  M06.09 714.0   3. High risk medication use  Z79.899 V58.69   4. Drug-induced immunodeficiency  D84.821 279.3    Z79.899 E947.9   5. Chronic pain of right knee  M25.561 719.46    G89.29 338.29   6. Positive serology for syphilis  A53.0 097.1   7. Chronic midline low back pain with bilateral sciatica  M54.41 724.2    M54.42 724.3    G89.29 338.29   8. Pes planus of both feet  M21.41 734    M21.42    9. Chronic gout of multiple sites, unspecified cause  M1A.09X0 274.02       Plan:     1. HLA-B27 positive arthropathy  Diagnosed in 2022 after presentation to ED on 11/22/2022 with c/o of pain and swelling in left wrist, left hand, bilateral ankles and right knee. Started with pain and swelling in left wrist and progress to bilateral ankles and right knee.  Denies fevers or recent travel. History of bloody diarrhea in second week of 11/2022 in which he presented to ER for eval.  Negative RF and anti CCP. BASIL negative. HLA B27 Positive. Work up negative for EBV, Parvovirus, Chlamydia and Gonorrhea and blood cultures negative. No rashes, ECHO normal with no vegetations. Hep panel, HIV negative.  Started on MTX in November of 2022, Humira added in December of 2022- worked well until March of 2023 secondary failure. Limited ROM of left wrist, repeat X-ray Jan 2023 showed showed DJD. Started Cosentyx June 2023 and tolerating well, also continues MTX 8 tab po qweek with folic acid 1 mg po qd.  Today on exam, no active synovitis noted.     -Continue  MTX 20 mg po qweek and folic acid 1 mg daily.   -Continue Cosentyx 150 mg subq every 4 weeks. If needed can increase dose of Cosentyx.  -Infection w/u negative 12/2023  -Chest Xray ok 1/2023  -Lab today for continued monitoring       2. Rheumatoid arthritis, seronegative,  multiple sites   -See above     3. High risk medication use/Drug Induced Immunodeficiency   -Advised to stay up-to-date on age appropriate vaccinations and malignancy screening with PCP.   -Persons with rheumatoid arthritis, lupus, psoriatic arthritis and other autoimmune diseases are at increased risk of cardiovascular disease including heart attack and stroke. We recommend that all patients with these conditions have annual health maintenance exams including lipid measurements, blood pressure measurements, and smoking cessation counseling when applicable at their primary care provider's office.   -Continued lab monitoring.   -Reminded to hold meds of Cosentyx and MTX for any fever or infections, also monitor ETOH with MTX to 1-2 drinks a week.   -Defers flu vacine     4. Chronic pain of right knee and secondary DJD   -Secondary to infection he had as a kid and secondary arthritis.   -9/18/23:  Mild DJD of left knee.  DJD changes seen in right knee.   -Currently stable at this time     5. History of Syphilis  -Treated with PCN in September 2022 at [facility] unit.      6. 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 Unsure procedure- on September 2022 with Dr. Burgos- has continued to follow up  -Completed PT which he reports has been very helpful      7. Pes Planus  -Bilateral feet with R>L- currently followed by Dr. Robb in [place] (podiatry)- recommended orthotics. May be contributing to some of his foot pain.   -12/18/2023 Right Foot Xray: Impression: No acute abnormalities are seen  Left Foot Xray: There is spurring of the insertion of the Achilles tendon.  There are changes suspicious for an early primary hallux valgus. Impression: Changes consistent with primary hallux valgus  -Enc to follow up with Dr Robb     8. Gout  -Currently treated by his PCP as noted Uric acid elevated in May 2023 at 7, recent Uric Acid December 2023 6.1  -Continue Allopurinol  100 mg po qd with PCP as directed.     9. Right Elbow Pain   -  Xray today for eval, possible medial epicondylitis  - Depo Medrol 80 mg IM today x1  - Ok to take NSAIDs as directed x 3-5 days  - Will keep us posted if no relief         No follow-ups on file. In addition to their scheduled follow up, the patient has also been instructed to follow up on as needed basis.     Total time spent with patient and documentation is 30 minutes. All questions were answered to patient's satisfaction and patient verbalized understanding. This includes face to face time and non-face to face time preparing to see the patient (eg, review of tests), obtaining and/or reviewing separately obtained history, documenting clinical information in the electronic or other health record, independently interpreting results and communicating results to the patient/family/caregiver, or care coordinator.

## 2024-06-12 ENCOUNTER — OFFICE VISIT (OUTPATIENT)
Dept: RHEUMATOLOGY | Facility: CLINIC | Age: 44
End: 2024-06-12
Payer: MEDICARE

## 2024-06-12 ENCOUNTER — HOSPITAL ENCOUNTER (OUTPATIENT)
Dept: RADIOLOGY | Facility: HOSPITAL | Age: 44
Discharge: HOME | End: 2024-06-12
Attending: NURSE PRACTITIONER
Payer: MEDICARE

## 2024-06-12 VITALS
DIASTOLIC BLOOD PRESSURE: 87 MMHG | HEART RATE: 85 BPM | TEMPERATURE: 98 F | SYSTOLIC BLOOD PRESSURE: 135 MMHG | BODY MASS INDEX: 30.72 KG/M2 | WEIGHT: 207.44 LBS | RESPIRATION RATE: 16 BRPM | OXYGEN SATURATION: 98 % | HEIGHT: 69 IN

## 2024-06-12 DIAGNOSIS — M1A.09X0 CHRONIC GOUT OF MULTIPLE SITES, UNSPECIFIED CAUSE: ICD-10-CM

## 2024-06-12 DIAGNOSIS — M54.42 CHRONIC MIDLINE LOW BACK PAIN WITH BILATERAL SCIATICA: ICD-10-CM

## 2024-06-12 DIAGNOSIS — D84.821 DRUG-INDUCED IMMUNODEFICIENCY: ICD-10-CM

## 2024-06-12 DIAGNOSIS — M12.80 HLA-B27 POSITIVE ARTHROPATHY: Primary | ICD-10-CM

## 2024-06-12 DIAGNOSIS — M21.42 PES PLANUS OF BOTH FEET: ICD-10-CM

## 2024-06-12 DIAGNOSIS — M25.521 RIGHT ELBOW PAIN: ICD-10-CM

## 2024-06-12 DIAGNOSIS — M25.561 CHRONIC PAIN OF RIGHT KNEE: ICD-10-CM

## 2024-06-12 DIAGNOSIS — Z79.899 DRUG-INDUCED IMMUNODEFICIENCY: ICD-10-CM

## 2024-06-12 DIAGNOSIS — Z79.899 HIGH RISK MEDICATION USE: ICD-10-CM

## 2024-06-12 DIAGNOSIS — M54.41 CHRONIC MIDLINE LOW BACK PAIN WITH BILATERAL SCIATICA: ICD-10-CM

## 2024-06-12 DIAGNOSIS — M21.41 PES PLANUS OF BOTH FEET: ICD-10-CM

## 2024-06-12 DIAGNOSIS — M06.09 RHEUMATOID ARTHRITIS, SERONEGATIVE, MULTIPLE SITES: ICD-10-CM

## 2024-06-12 DIAGNOSIS — G89.29 CHRONIC MIDLINE LOW BACK PAIN WITH BILATERAL SCIATICA: ICD-10-CM

## 2024-06-12 DIAGNOSIS — G89.29 CHRONIC PAIN OF RIGHT KNEE: ICD-10-CM

## 2024-06-12 DIAGNOSIS — A53.0 POSITIVE SEROLOGY FOR SYPHILIS: ICD-10-CM

## 2024-06-12 LAB
C TRACH DNA SPEC QL NAA+PROBE: NOT DETECTED
N GONORRHOEA DNA SPEC QL NAA+PROBE: NOT DETECTED
SOURCE (OHS): NORMAL

## 2024-06-12 PROCEDURE — 99214 OFFICE O/P EST MOD 30 MIN: CPT | Mod: S$PBB,,, | Performed by: NURSE PRACTITIONER

## 2024-06-12 PROCEDURE — 87491 CHLMYD TRACH DNA AMP PROBE: CPT | Performed by: NURSE PRACTITIONER

## 2024-06-12 PROCEDURE — 3075F SYST BP GE 130 - 139MM HG: CPT | Mod: CPTII,,, | Performed by: NURSE PRACTITIONER

## 2024-06-12 PROCEDURE — 1159F MED LIST DOCD IN RCRD: CPT | Mod: CPTII,,, | Performed by: NURSE PRACTITIONER

## 2024-06-12 PROCEDURE — 87591 N.GONORRHOEAE DNA AMP PROB: CPT | Performed by: NURSE PRACTITIONER

## 2024-06-12 PROCEDURE — 3008F BODY MASS INDEX DOCD: CPT | Mod: CPTII,,, | Performed by: NURSE PRACTITIONER

## 2024-06-12 PROCEDURE — 3079F DIAST BP 80-89 MM HG: CPT | Mod: CPTII,,, | Performed by: NURSE PRACTITIONER

## 2024-06-12 PROCEDURE — 99214 OFFICE O/P EST MOD 30 MIN: CPT | Mod: PBBFAC,25 | Performed by: NURSE PRACTITIONER

## 2024-06-12 PROCEDURE — 73070 X-RAY EXAM OF ELBOW: CPT | Mod: TC,RT

## 2024-06-12 PROCEDURE — 1160F RVW MEDS BY RX/DR IN RCRD: CPT | Mod: CPTII,,, | Performed by: NURSE PRACTITIONER

## 2024-06-12 PROCEDURE — 96372 THER/PROPH/DIAG INJ SC/IM: CPT | Mod: PBBFAC

## 2024-06-12 RX ORDER — METHYLPREDNISOLONE ACETATE 40 MG/ML
40 INJECTION, SUSPENSION INTRA-ARTICULAR; INTRALESIONAL; INTRAMUSCULAR; SOFT TISSUE
Status: COMPLETED | OUTPATIENT
Start: 2024-06-12 | End: 2024-06-12

## 2024-06-12 RX ORDER — METHOTREXATE 2.5 MG/1
TABLET ORAL
Qty: 40 TABLET | Refills: 2 | Status: SHIPPED | OUTPATIENT
Start: 2024-06-12

## 2024-06-12 RX ORDER — FOLIC ACID 1 MG/1
1 TABLET ORAL DAILY
Qty: 30 TABLET | Refills: 6 | Status: SHIPPED | OUTPATIENT
Start: 2024-06-12 | End: 2024-09-10

## 2024-06-12 RX ADMIN — METHYLPREDNISOLONE ACETATE 40 MG: 40 INJECTION, SUSPENSION INTRA-ARTICULAR; INTRALESIONAL; INTRAMUSCULAR; SOFT TISSUE at 11:06

## 2024-06-13 ENCOUNTER — TELEPHONE (OUTPATIENT)
Dept: FAMILY MEDICINE | Facility: CLINIC | Age: 44
End: 2024-06-13

## 2024-06-13 ENCOUNTER — TELEPHONE (OUTPATIENT)
Dept: RHEUMATOLOGY | Facility: CLINIC | Age: 44
End: 2024-06-13
Payer: MEDICARE

## 2024-06-13 ENCOUNTER — PATIENT MESSAGE (OUTPATIENT)
Dept: RHEUMATOLOGY | Facility: CLINIC | Age: 44
End: 2024-06-13
Payer: MEDICARE

## 2024-06-13 ENCOUNTER — E-VISIT (OUTPATIENT)
Dept: FAMILY MEDICINE | Facility: CLINIC | Age: 44
End: 2024-06-13
Payer: MEDICARE

## 2024-06-13 DIAGNOSIS — G89.29 CHRONIC MIDLINE BACK PAIN, UNSPECIFIED BACK LOCATION: ICD-10-CM

## 2024-06-13 DIAGNOSIS — M54.9 BACK PAIN, UNSPECIFIED BACK LOCATION, UNSPECIFIED BACK PAIN LATERALITY, UNSPECIFIED CHRONICITY: Primary | ICD-10-CM

## 2024-06-13 DIAGNOSIS — M54.9 CHRONIC MIDLINE BACK PAIN, UNSPECIFIED BACK LOCATION: ICD-10-CM

## 2024-06-13 RX ORDER — TIZANIDINE 2 MG/1
2 TABLET ORAL EVERY 8 HOURS PRN
Qty: 60 TABLET | Refills: 1 | Status: SHIPPED | OUTPATIENT
Start: 2024-06-13 | End: 2024-07-13

## 2024-06-13 RX ORDER — CELECOXIB 200 MG/1
200 CAPSULE ORAL 2 TIMES DAILY
Qty: 60 CAPSULE | Refills: 0 | Status: SHIPPED | OUTPATIENT
Start: 2024-06-13 | End: 2024-07-13

## 2024-06-13 NOTE — TELEPHONE ENCOUNTER
----- Message from CATRACHITA Aguayo sent at 6/12/2024  3:56 PM CDT -----  Please advise the patient that all lab are noted to be clinically acceptable as well a right elbow xray noted to be normal,  we will continue to monitor at future lab draws

## 2024-06-13 NOTE — PROGRESS NOTES
Patient ID: Trey Harper is a 44 y.o. male.    Chief Complaint: Back Pain (Entered automatically based on patient selection in Patient Portal.)          274}  The patient initiated a request through [facility] on 6/13/2024 for evaluation and management with a chief complaint of Back Pain (Entered automatically based on patient selection in Patient Portal.)     I evaluated the questionnaire submission on 06/13/2024 .    Total Time (in minutes): 21     [facility] Peq Evisit Back Pain    6/13/2024  3:03 PM CDT - Filed by Patient   Do you agree to participate in an E-Visit? Yes   If you have any of the following symptoms, please present to your local emergency room or call 911: I acknowledge   What is the main issue you would like addressed today? I want to fine a back specialist please   Where are you having pain? Lower Back   Does the pain extend into your legs? No   How bad is the pain? The pain is mild   Did you have an injury that caused the pain? No, I cannot remember an injury   How long has the pain been present? More than 4 weeks   Have you had back pain in the past? Yes, I have many times had pain similiar to this before   Please list any medications or treatments you have used for back pain and indicate if it was effective or not. I had surgery on back I'm looking for new specialist   Do you have a fever? I do not know   Do you have any of the following? None of the above   What makes the pain worse? None of the above   What makes the pain better? Lying in bed   Have you ever been diagnosed with cancer? No   Have you ever been diagnosed with degenerative disc disease (arthritis of the spine)? No   Have you ever been diagnosed with osteoporosis or any other bone weakness? No   Have you ever had surgery on your back or spine? Yes   What is your usual health status?    Provide any additional information you feel is important.    Please attach any relevant images or files    Are you able to take your vital signs? 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    High risk medication use 12/05/2022    Rheumatoid arthritis, seronegative, multiple sites 12/05/2022    Chronic pain of right knee 12/05/2022    Positive serology for syphilis 12/05/2022    Acute pain of left wrist 11/22/2022    Polyarthralgia 11/22/2022    SIRS (systemic inflammatory response syndrome) 11/22/2022      Recent Labs Obtained:  Lab Results   Component Value Date    WBC 8.83 06/12/2024    HGB 15.4 06/12/2024    HCT 43.0 06/12/2024    MCV 87.9 06/12/2024     06/12/2024     06/12/2024    K 4.1 06/12/2024    CREATININE 1.07 06/12/2024    EGFRNORACEVR >60 06/12/2024      Review of patient's allergies indicates:  No Known Allergies    Encounter Diagnoses   Name Primary?    Back pain, unspecified back location, unspecified back pain laterality, unspecified chronicity Yes    Chronic midline back pain, unspecified back location         Orders Placed This Encounter   Procedures    Ambulatory referral/consult to [facility]     Standing Status:   Future     Standing Expiration Date:   7/13/2025     Referral Priority:   Routine     Referral Type:   Consultation     Referral Reason:   Specialty Services Required     Number of Visits Requested:   1      Medications Ordered This Encounter   Medications    celecoxib (CELEBREX) 200 MG capsule     Sig: Take 1 capsule (200 mg total) by mouth 2 (two) times daily.     Dispense:  60 capsule     Refill:  0    tiZANidine (ZANAFLEX) 2 MG tablet     Sig: Take 1 tablet (2 mg total) by mouth every 8 (eight) hours as needed  (muscle spasms).     Dispense:  60 tablet     Refill:  1      Rec to try celebrex trial over meloxicam placed ref   E-Visit Time Tracking:    Day 1 Time (in minutes): 21    Total Time (in minutes): 40      274}

## 2024-06-13 NOTE — TELEPHONE ENCOUNTER
----- Message from Dylan Dewey MD sent at 6/13/2024  3:32 PM CDT -----  Regarding: back pain specialist ref  Placed ref to back and spine please set up thx

## 2024-06-14 NOTE — TELEPHONE ENCOUNTER
I attempt to call pt no answer unable to leave a voicemail message no voice box setup. Sandra pascual a message thru the portal on 6/13/24

## 2024-06-27 ENCOUNTER — HOSPITAL ENCOUNTER (OUTPATIENT)
Dept: RADIOLOGY | Facility: HOSPITAL | Age: 44
Discharge: HOME | End: 2024-06-27
Payer: MEDICARE

## 2024-06-27 DIAGNOSIS — M54.41 ACUTE BACK PAIN WITH SCIATICA, RIGHT: ICD-10-CM

## 2024-06-27 PROCEDURE — 73521 X-RAY EXAM HIPS BI 2 VIEWS: CPT | Mod: TC

## 2024-07-16 DIAGNOSIS — M54.9 CHRONIC MIDLINE BACK PAIN, UNSPECIFIED BACK LOCATION: ICD-10-CM

## 2024-07-16 DIAGNOSIS — G89.29 CHRONIC MIDLINE BACK PAIN, UNSPECIFIED BACK LOCATION: ICD-10-CM

## 2024-07-16 RX ORDER — CELECOXIB 200 MG/1
200 CAPSULE ORAL 2 TIMES DAILY
Qty: 60 CAPSULE | Refills: 0 | OUTPATIENT
Start: 2024-07-16

## 2024-07-16 NOTE — TELEPHONE ENCOUNTER
Refill Routing Note   Medication(s) are not appropriate for processing by [facility] for the following reason(s):        Outside of protocol    [facility] action(s):  Route               Appointments  past 12m or future 3m with PCP    Date Provider   Last Visit   6/13/2024 Dylan Dewey MD   Next Visit   Visit date not found Dylan Dewey MD   ED visits in past 90 days: 0        Note composed:2:00 PM 07/16/2024

## 2024-07-17 NOTE — TELEPHONE ENCOUNTER
Provider Staff:  Please note Refusal of medication.     Action required for this patient.      Requested Prescriptions     Refused Prescriptions Disp Refills    celecoxib (CELEBREX) 200 MG capsule [Pharmacy Med Name: CELECOXIB 200MG CAP] 60 capsule 0     Sig: TAKE 1 CAPSULE  BY MOUTH 2 (TWO) TIMES DAILY.     Refused By: ELÍAS MEJIA     Reason for Refusal: Patient needs an appointment      Thanks!  [facility]   Note composed: 07/17/2024 6:09 AM

## 2024-07-24 DIAGNOSIS — M12.80 HLA-B27 POSITIVE ARTHROPATHY: Primary | ICD-10-CM

## 2024-07-24 RX ORDER — SECUKINUMAB 150 MG/ML
INJECTION SUBCUTANEOUS
Qty: 1 ML | Refills: 5 | Status: SHIPPED | OUTPATIENT
Start: 2024-07-24

## 2024-08-13 NOTE — PROGRESS NOTES
[facility]  Otolaryngology Clinic Note    Trey Harper  YOB: 1980    Chief Complaint:   Chief Complaint   Patient presents with    Follow-up        HPI: 1/26/24: Patient complains of recurrent acute sinus infections. Averages about 2 a year. Currently on antibiotics and steroids from primary care. Not on nasal steroids. Does have springtime allergy symptomatology. Complains of ears being full, muffled, sometimes sound is aggravating, has some pressure, no vertigo. No recent audiograms. Problems tend to clear up in the off season without any significant sequela. Complicating his history is his comorbidities including autoimmune issues. Winter and spring are his worst times, seems to be well in between seasons. No prior history of ear surgery.     02/14/2024: 44 y.o. male returns for f/u. Reports the flonase has been helping and he feels that everything is clearing up. He has been using it once daily & has not done any saline sinus rinses. He is very concerned that he has difficulty hearing when he gets sinus infections and states his PCP has told him he has very bad sinuses. Denies frequent nasal congestion or facial pressure. States he blows his nose a lot and sometimes sees blood streaked mucous, especially when the air is dry.      08/14/2024: States he was recently seen by PCP a couple weeks ago and told he had b/l ear infections and was placed on abx. Denies otalgia, otorrhea, or HL. States he feels as if his ears stop up whenever his sinuses flare. He has been traveling more for work & admits he does not always remember to use flonase. Also with recent epiphora and occasional eye itching. Reports he does not seem to tolerate getting water in his ears.     ROS:   10-point review of systems negative except per HPI      Review of patient's allergies indicates:  No Known Allergies    Past Medical History:   Diagnosis Date    Arthritis     Gout, unspecified     Other  specified noninfective gastroenteritis and colitis     Syphilis, unspecified        Past Surgical History:   Procedure Laterality Date    SPINE SURGERY  2022       Social History     Socioeconomic History    Marital status: Single   Tobacco Use    Smoking status: Never    Smokeless tobacco: Never   Substance and Sexual Activity    Alcohol use: Not Currently    Drug use: Never    Sexual activity: Not Currently     Partners: Male       Family History   Problem Relation Age of Onset    Cancer Father     Rheum arthritis Paternal Aunt     Rheum arthritis Paternal Grandmother        Outpatient Encounter Medications as of 2024   Medication Sig Dispense Refill    ALCOHOL PREP PADS PadM SMARTSIG:Pledget(s) Topical      allopurinoL (ZYLOPRIM) 100 MG tablet Take 100 mg by mouth Daily.      fluticasone propionate (FLONASE) 50 mcg/actuation nasal spray 2 sprays (100 mcg total) by Each Nostril route once daily. 15.8 mL 0    folic acid (FOLVITE) 1 MG tablet Take 1 tablet (1 mg total) by mouth once daily. 30 tablet 6    ketorolac (TORADOL) 10 mg tablet Take 1 tablet (10 mg total) by mouth every 6 (six) hours as needed for Pain. 20 tablet 0    meloxicam (MOBIC) 7.5 MG tablet Take 7.5 mg by mouth once daily.      methotrexate 2.5 MG Tab Take 4 tablets on Friday morning and 4 tablets on Friday night, once a week. Hold for infection or fever. 40 tablet 3    PROTONIX 40 mg tablet Take 40 mg by mouth once daily.      SHARPS CONTAINER use as directed      sildenafiL (VIAGRA) 100 MG tablet Take 100 mg by mouth daily as needed for Erectile Dysfunction.      [] secukinumab (COSENTYX PEN) 150 mg/mL PnIj Inject 1 pen (150 mg) into the skin every 28 days. 1 mL 6     No facility-administered encounter medications on file as of 2024.       Physical Exam:  Vitals:    24 0910   BP: 132/88   BP Location: Right arm   Patient Position: Sitting   BP Method: Large (Automatic)   Pulse: 67   Temp: 98.3 °F (36.8 °C)   TempSrc:  Oral       Physical Exam   General: NAD, voice normal  Neuro: AAO, CN II - XII grossly intact  Head/ Face: NCAT, symmetric, sensations intact bilaterally  Eyes: EOMI, PERRL  Ears: externally normal with grossly normal hearing  AD: EAC with nonobstructive cerumen, TM intact, no middle ear effusion, no retractions  AS: EAC patent, TM intact, no middle ear effusion, no retractions  Nose: bilateral nares patent, midline septum, no rhinorrhea, no external deformity, +ITH  OC/OP: MMM, no intraoral lesions, no trismus, dentition is moderate, no uvular deviation, bilaterally symmetric soft palate elevation, palatoglossus and palatopharyngeal fold wnl; tonsils are symmetric and 1+  Indirect laryngoscopy: deferred due to patient intolerance  Neck: soft, supple, no LAD, normal ROM, no thyromegaly  Respiratory: nonlabored, no wheezing, bilateral chest rise  Cardiovascular: RRR  Gastrointestinal: S NT ND  Skin: warm, no lesions  Musculoskeletal: 5/5 strength  Psych: Appropriate affect/mood     Pertinent Data:  ? LABS:  ? AUDIO:                   ? PATH:      Imaging:   I personally reviewed the following images:        Assessment/Plan:  44 y.o. male with AR and transient ETD. CT neck 11/2023 unremarkable for sinus dz, but there is turbinate hypertrophy & right septal spur. Audio WNL, type A tymps. Reassurance provided there are no signs of otologic infection.   - NSI 1-2x/day, prn. Discussed taking saline mist if traveling  - Flonase BID (reinforced importance of correct technique)  - Add astelin BID  - Zyrtec daily   - RTC 6-12mo    Yenni Clark NP

## 2024-08-14 ENCOUNTER — OFFICE VISIT (OUTPATIENT)
Dept: OTOLARYNGOLOGY | Facility: CLINIC | Age: 44
End: 2024-08-14
Payer: MEDICARE

## 2024-08-14 VITALS — DIASTOLIC BLOOD PRESSURE: 82 MMHG | HEART RATE: 87 BPM | SYSTOLIC BLOOD PRESSURE: 133 MMHG | TEMPERATURE: 98 F

## 2024-08-14 DIAGNOSIS — H04.209 EPIPHORA, UNSPECIFIED LATERALITY: ICD-10-CM

## 2024-08-14 DIAGNOSIS — J30.9 ALLERGIC RHINITIS, UNSPECIFIED SEASONALITY, UNSPECIFIED TRIGGER: Primary | ICD-10-CM

## 2024-08-14 DIAGNOSIS — H69.90 DYSFUNCTION OF EUSTACHIAN TUBE, UNSPECIFIED LATERALITY: ICD-10-CM

## 2024-08-14 DIAGNOSIS — R09.81 NASAL CONGESTION: ICD-10-CM

## 2024-08-14 PROCEDURE — 3075F SYST BP GE 130 - 139MM HG: CPT | Mod: CPTII,,, | Performed by: NURSE PRACTITIONER

## 2024-08-14 PROCEDURE — 1159F MED LIST DOCD IN RCRD: CPT | Mod: CPTII,,, | Performed by: NURSE PRACTITIONER

## 2024-08-14 PROCEDURE — 99213 OFFICE O/P EST LOW 20 MIN: CPT | Mod: PBBFAC | Performed by: NURSE PRACTITIONER

## 2024-08-14 PROCEDURE — 3079F DIAST BP 80-89 MM HG: CPT | Mod: CPTII,,, | Performed by: NURSE PRACTITIONER

## 2024-08-14 PROCEDURE — 99213 OFFICE O/P EST LOW 20 MIN: CPT | Mod: S$PBB,,, | Performed by: NURSE PRACTITIONER

## 2024-08-14 RX ORDER — HYDROCODONE BITARTRATE AND ACETAMINOPHEN 7.5; 325 MG/1; MG/1
TABLET ORAL
COMMUNITY
Start: 2024-06-26

## 2024-08-14 RX ORDER — AZELASTINE 1 MG/ML
1 SPRAY, METERED NASAL 2 TIMES DAILY
Qty: 90 ML | Refills: 3 | Status: SHIPPED | OUTPATIENT
Start: 2024-08-14 | End: 2025-08-14

## 2024-08-14 RX ORDER — FLUTICASONE PROPIONATE 50 MCG
2 SPRAY, SUSPENSION (ML) NASAL 2 TIMES DAILY
Qty: 48 G | Refills: 3 | Status: SHIPPED | OUTPATIENT
Start: 2024-08-14 | End: 2024-09-13

## 2024-08-14 RX ORDER — MELOXICAM 7.5 MG/1
TABLET ORAL
COMMUNITY

## 2024-08-14 RX ORDER — CETIRIZINE HYDROCHLORIDE 10 MG/1
10 TABLET ORAL DAILY
Qty: 90 TABLET | Refills: 3 | Status: SHIPPED | OUTPATIENT
Start: 2024-08-14 | End: 2025-08-09

## 2024-08-14 RX ORDER — MULTIVITAMIN
TABLET ORAL
COMMUNITY

## 2024-09-10 NOTE — PROGRESS NOTES
Patient ID: [id number]     Chief Complaint: HLA-B27 positive arthropathy (Pt denies any complaints or pain at this time)    HPI:     Trey Harper is a 44 y.o. male here today for follow up of spondyloarthritis, +HLA-B27.      Presents today for a follow up of +HLA-B27 Spondyloarthritis/reactive arthritis diagnosed in 2022 after presentation to ED on 11/22/2022 with c/o of pain and swelling in left wrist, left hand, bilateral ankles and right knee.  History of bloody diarrhea few weeks prior to that presentation.  He was treated with Ciprofloxacin and Flagyl at the time. History of septic arthritis in right knee when he was 5 to 6 years old. He previously tested positive for Syphilis and was treated with Penicillin in 9/2022.  He was initially started on Humira but developed secondary failure.      He has seen Podiatry in the past and was told he needed braces for his ankles, followed by Dr. Garvin (podiatrist) in [place]. He also reports knee pain for last 4-5 months, worse with activities, his PCP planning to refer him to Ortho for knee pain. He was also started on Allopurinol 100 mg po qd by his PCP. Continues to have intermittent back pain, worse when walking and standing on feet. Reports back pain at night, has a hard time falling asleep due to pain and getting comfortable, when he does fall asleep he states it is very short lived due to pain waking him up.     He has continued to have issues with his feet, mainly with walking, recently visited the [employer] [facility] and had some inserts made so hoping this helps. Has also seen Podiatry in the past and was told he needed braces for his ankles, followed by Dr. Garvin (podiatrist) in [place]- he has tried to wear but has been uncomfortable so admits not wearing as directed.     June 2024: Here today for follow up. Currently taking Methotrexate 20 mg per week with folic acid 1 mg daily and Cosentyx 150 mg subQ every 4 weeks. He denies any  red/warm/swollen joints, morning stiffness lasting roughly 15-20 minutes. His feet continue to bother him at times, he was seeing [facility] in the past but has not seen him in a while, last discussed Orthotics and does have but has issues with blisters when uses. Back pain is not bothering him as much now, will have some aches and pains here and there but for the most part has been much better with meds. He has also been having right elbow pain for the past 2 months, comes and goes, denies injury. Reports when he bends his elbow he can feel pain, feels pain is deep to medial aspect, he is right hand dominant. He has not taken anything for pain.    September 2024: Here today for follow-up visit. Currently taking Methotrexate 20 mg per week with folic acid 1 mg daily and Cosentyx 150 mg subQ every 4 weeks. He denies any red/warm/swollen joints, morning stiffness lasting roughly 15-20 minutes. He reports he will have some swelling in his feet at times if he is on them for long period- he will ice his feet at night time and this does help. He does report an increase in fatigue over the past few months- states he has been sleeping well but states when he wakes up he is not feeling rested- he did just have an apt with his PCP 2 days ago and is awaiting lab results- however did not mention to her the fatigue at that time.       Denies any recent illness or hospitalizations since last visit.     Dr. Robb in [place]- Podiatry  Dr. Burgos- Neurospinal  Dr. Olea- Ophthalmology     PMH: GERD and chronic right knee pain.    Denies fevers, rashes, oral and nasal ulcers, history of DVT or PE, history of stroke or seizures, history of MI, history of malignancies, Raynaud's phenomenon, history of inflammatory eye diseases, history of inflammatory bowel disease.  Family history of autoimmune disease: Pat Aunt with RA.   Smoking: none     Social History     Tobacco Use   Smoking Status Never   Smokeless Tobacco Never           -------------------------------------    Arthritis    Gout, unspecified    Other specified noninfective gastroenteritis and colitis    Syphilis, unspecified        Past Surgical History:   Procedure Laterality Date    SPINE SURGERY  09/19/2022       Review of patient's allergies indicates:  No Known Allergies    Outpatient Medications Marked as Taking for the 9/12/24 encounter (Office Visit) with Angeles Johns FNP   Medication Sig Dispense Refill    ALCOHOL PREP PADS PadM SMARTSIG:Pledget(s) Topical      allopurinoL (ZYLOPRIM) 100 MG tablet Take 100 mg by mouth Daily.      azelastine (ASTELIN) 137 mcg (0.1 %) nasal spray 1 spray (137 mcg total) by Nasal route 2 (two) times daily. 90 mL 3    cetirizine (ZYRTEC) 10 MG tablet Take 1 tablet (10 mg total) by mouth once daily. 90 tablet 3    COSENTYX  mg/mL PnIj Inject 150 mg subq qmonth. Hold for fever or infections. 1 mL 5    fluticasone propionate (FLONASE) 50 mcg/actuation nasal spray 2 sprays (100 mcg total) by Each Nostril route 2 (two) times a day. 48 g 3    HYDROcodone-acetaminophen (NORCO) 7.5-325 mg per tablet 1 tablet as needed Orally every 6 hrs for 5 days  Quantity medically needed, LGB      meloxicam (MOBIC) 7.5 MG tablet TAKE ONE TABLET BY MOUTH ONCE A DAY for 90 days      methotrexate 2.5 MG Tab Take 4 tablets on Friday morning and 4 tablets on Friday night, once a week. Hold for infection or fever. 40 tablet 2    multivit with min-folic acid 0.4 mg Tab 1 tablet Orally Once a day      PROTONIX 40 mg tablet Take 40 mg by mouth once daily.      SHARPS CONTAINER use as directed         Social History     Socioeconomic History    Marital status: Single   Tobacco Use    Smoking status: Never    Smokeless tobacco: Never   Substance and Sexual Activity    Alcohol use: Not Currently    Drug use: Never    Sexual activity: Not Currently     Partners: Male     Birth control/protection: None        Family History   Problem Relation Name Age  "of Onset    Cancer Father Esophagus cancer     Rheum arthritis Paternal Aunt      Rheum arthritis Paternal Grandmother          Immunization History   Administered Date(s) Administered    DT (Pediatric) 12/17/1984, 09/30/1994    DTP 1980, 1980, 1980, 10/22/1981    MMR 06/25/1981    OPV 1980, 1980, 1980, 10/22/1981       Patient Care Team:  Prisca Contreras MD as PCP - General (Internal Medicine)     Subjective:     ROS    Constitutional:  Denies chills. Denies fever. Denies night sweats. Denies weight loss.   Ophthalmology: Denies blurred vision. Denies dry eyes. Denies eye pain. Denies Itching and redness.   ENT: Denies oral ulcers. Denies epistaxis. Denies dry mouth. Denies swollen glands.   Endocrine: Denies diabetes. Denies thyroid Problems.   Respiratory: Admits dry cough- has PND right now. Denies shortness of breath. Denies shortness of breath with exertion. Denies hemoptysis.   Cardiovascular: Denies chest pain at rest. Denies chest pain with exertion. Denies palpitations.    Gastrointestinal: Denies abdominal pain. Denies diarrhea. Denies nausea. Denies vomiting. Denies hematemesis or hematochezia. Denies heartburn.  Genitourinary: Denies blood in urine.  Musculoskeletal: See HPI for details  Integumentary: Denies rash. Denies photosensitivity.   Peripheral Vascular: Denies Ulcers of hands and/or feet. Denies Cold extremities.   Neurologic: Denies dizziness. Denies headache.  Denies loss of strength. Denies numbness or tingling.   Psychiatric: Denies depression and anxiety- doing much better since he is feeling better. Denies suicidal/homicidal ideations.      Objective:     /87 (BP Location: Right arm, Patient Position: Sitting, BP Method: Large (Automatic))   Pulse 69   Temp 98.1 °F (36.7 °C) (Oral)   Resp 20   Ht 5' 9" (1.753 m)   Wt 94.9 kg (209 lb 3.2 oz)   SpO2 99%   BMI 30.89 kg/m²     Physical Exam    General Appearance: alert, pleasant, in no " acute distress.  Skin: Skin color, texture, turgor normal. No rashes or lesions.  Eyes:  extraocular movement intact (EOMI), pupils equal, round, reactive to light and accommodation, conjunctiva clear.  ENT: No oral or nasal ulcers.  Neck:  Neck supple. No adenopathy.   Lungs: CTA throughout without crackles, rhonchi, or wheezes.   Heart: RRR w/o murmurs.  No edema.   Neuro: Alert, oriented, CN II-XII GI, sensory and motor innervation intact.  Musculoskeletal: No pain to bilateral MCPs, FROM noted to wrists and shoulders with no pain, medial epicondyle tenderness to right elbow, left with no pain, FROM noted bilaterally, + crepitus noted bilateral knees. No pain to bilateral MTPs. Pes planus noted to bilateral feet with R>L.    Psych: Alert, oriented, normal eye contact.    Labs:   11/22/2022: Hep B and C negative. HIV negative. Uric acid normal. ANCA negative. HLAB 27 positive. RPR and syphilis ab positive.  Chlamydia and gonorrhea PCR negative.  BASIL negative.  Rheumatoid factor and anti CCP negative.  EBV IgM negative, IgG positive.  Parvovirus B19 not detected.  EBV DNA not detected. Blood culture negative.     12/03/2022:  WBC count 17.7.  Hemoglobin 13.4.  Platelet count elevated 447.  ESR elevated 128, normal less than 15.  Calcium slightly elevated 10.8.  Alk phos 172.  CMP okay.  ALT upper limit of normal.  CRP elevated 131.    1/17/23:  CBC, CMP okay.  ESR, CRP normal. Quantiferon tb negative.     3/27/23: CBC ok. ESR 21, . CMP ok.     4/17/23:  1+ protein and no blood in urine.   milligram/gram.  Urine chlamydia gonorrhea PCR negative.    5/8/2023 CMP Creat 1.22 , Calcium 10.6, otherwise ok. CRP 97.50 (previous 203.70, normal <5). CBC hgb 13.4. platelet 444 (previous 275), otherwise ok. Sed Rate 24 (<15). Uric Acid 7 (previous 4.9).     7/11/2023 CBC ok. CMP ok. Sed Rate 4 (<15), CRP 2.90 (<5). Uric Acid 4.5     09/18/2023: CMP okay.  Lupus anticoagulant not detected. Cardiolipin/phospholipid  negative.  Beta 2 glycoprotein negative.  CBC okay.  Uric acid normal 4.7.     2023 CBC WBC 12.89, ANC 9.69 (strep), otherwise ok. CMP creat 1.20    2023 CRP 6.80 (<5), Hep B/C/HIV negative, Uric Acid 6.1, Sed Rate 10 (<15), CRP 6.80 (<5), Quantiferon negative    3/26/2024 CMP ok. CBC ok.     2024 CMP ok. CBC ok. CRP 2 (<5), Uric Acid 6.3 ok. Sed Rate 3 (<15). Chlamydia and GC negative.     Imagin22:  X-ray of left wrist showed possible small avulsion injury at the distal tip of the ulnar styloid process, no erosions.  X-ray of left ankle showed soft tissue swelling on lateral side.  Normal x-ray of right ankle.  Normal x-ray of right knee.    23:  Normal chest x-ray.  X-ray of right hand showed mild DJD.  X-ray of left hand showed DJD.     2022:  Echocardiogram showed EF 64%, normal ejection fraction.  Normal right ventricular function.  Normal left ventricular systolic and diastolic function.    2023: SI Joint Xray: Mild SI Joint sclerosis     23:  Mild DJD of left knee.  DJD changes seen in right knee.    2023 Right Foot Xray: Impression: No acute abnormalities are seen  Left Foot Xray: There is spurring of the insertion of the Achilles tendon.  There are changes suspicious for an early primary hallux valgus. Impression: Changes consistent with primary hallux valgus.    2024 Right Elbow Xray: Impression: No acute osseous abnormality.  Assessment:       ICD-10-CM ICD-9-CM   1. HLA-B27 positive arthropathy  M12.80 716.80   2. Rheumatoid arthritis, seronegative, multiple sites  M06.09 714.0   3. High risk medication use  Z79.899 V58.69   4. Drug-induced immunodeficiency  D84.821 279.3    Z79.899 E947.9   5. Chronic pain of right knee  M25.561 719.46    G89.29 338.29   6. Chronic midline low back pain with bilateral sciatica  M54.41 724.2    M54.42 724.3    G89.29 338.29   7. Positive serology for syphilis  A53.0 097.1   8. Pes planus of both feet  M21.41 734     M21.42    9. Right elbow pain  M25.521 719.42   10. Chronic gout of multiple sites, unspecified cause  M1A.09X0 274.02         Plan:     1. HLA-B27 positive arthropathy  Diagnosed in 2022 after presentation to ED on 11/22/2022 with c/o of pain and swelling in left wrist, left hand, bilateral ankles and right knee. Started with pain and swelling in left wrist and progress to bilateral ankles and right knee.  Denies fevers or recent travel. History of bloody diarrhea in second week of 11/2022 in which he presented to ER for eval.  Negative RF and anti CCP. BASIL negative. HLA B27 Positive. Work up negative for EBV, Parvovirus, Chlamydia and Gonorrhea and blood cultures negative. No rashes, ECHO normal with no vegetations. Hep panel, HIV negative. Started on MTX in November of 2022, Humira added in December of 2022- worked well until March of 2023 secondary failure. Limited ROM of left wrist, repeat X-ray Jan 2023 showed showed DJD. Started Cosentyx June 2023 and tolerating well, also continues MTX 8 tab po qweek with folic acid 1 mg po qd.  Today on exam, no active synovitis noted.     -Continue  MTX 20 mg po qweek and folic acid 1 mg daily.   -Continue Cosentyx 150 mg subq every 4 weeks. If needed can increase dose of Cosentyx.  -Infection w/u negative 12/2023- he would like to have his HIV repeated today- denies sexual activity but would like to have it checked today   -Chest Xray ok 1/2023  -Lab today for continued monitoring       2. Rheumatoid arthritis, seronegative, multiple sites   -See above     3. High risk medication use/Drug Induced Immunodeficiency   -Advised to stay up-to-date on age appropriate vaccinations and malignancy screening with PCP.   -Persons with rheumatoid arthritis, lupus, psoriatic arthritis and other autoimmune diseases are at increased risk of cardiovascular disease including heart attack and stroke. We recommend that all patients with these conditions have annual health maintenance  exams including lipid measurements, blood pressure measurements, and smoking cessation counseling when applicable at their primary care provider's office.   -Continued lab monitoring.   -Reminded to hold meds of Cosentyx and MTX for any fever or infections, also monitor ETOH with MTX to 1-2 drinks a week.      4. Chronic pain of right knee and secondary DJD   -Secondary to infection he had as a kid and secondary arthritis.   -9/18/23:  Mild DJD of left knee.  DJD changes seen in right knee.   -Currently stable at this time     5. History of Syphilis  -Treated with PCN in September 2022 at [facility].      6. 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 Unsure procedure- on September 2022 with Dr. Burgos- has continued to follow up  -Completed PT which he reports has been very helpful - stable     7. Pes Planus  -Bilateral feet with R>L- currently followed by Dr. Robb in [place] (podiatry)- recommended orthotics. May be contributing to some of his foot pain.   -12/18/2023 Right Foot Xray: Impression: No acute abnormalities are seen  Left Foot Xray: There is spurring of the insertion of the Achilles tendon.  There are changes suspicious for an early primary hallux valgus. Impression: Changes consistent with primary hallux valgus  -Enc to continued to follow up with Dr Robb     8. Gout  -Currently treated by his PCP as noted Uric acid elevated in May 2023 at 7, recent Uric Acid December 2023 6.1  -Continue Allopurinol 100 mg po qd with PCP as directed.     9. Right Elbow Pain   - Depo Medrol 80 mg IM today x1  - Ok to take NSAIDs as directed x 3-5 days  - Today reports has improved, but will occ bother him- will continue to monitor      10. Fatigue, Waking up not feeling rested after sleep  - Will check TSH today and also recheck Vitamin D  - Pending lab, if all ok, enc to reach out to PCP to discuss sleep study to r/o INEZ.          Follow up in about 6  months (around 3/12/2025) for NP Follow Up. In addition to their scheduled follow up, the patient has also been instructed to follow up on as needed basis.     Total time spent with patient and documentation is 35 minutes. All questions were answered to patient's satisfaction and patient verbalized understanding. This includes face to face time and non-face to face time preparing to see the patient (eg, review of tests), obtaining and/or reviewing separately obtained history, documenting clinical information in the electronic or other health record, independently interpreting results and communicating results to the patient/family/caregiver, or care coordinator.

## 2024-09-12 ENCOUNTER — LAB VISIT (OUTPATIENT)
Dept: LAB | Facility: HOSPITAL | Age: 44
End: 2024-09-12
Attending: NURSE PRACTITIONER
Payer: MEDICARE

## 2024-09-12 ENCOUNTER — OFFICE VISIT (OUTPATIENT)
Dept: RHEUMATOLOGY | Facility: CLINIC | Age: 44
End: 2024-09-12
Payer: MEDICARE

## 2024-09-12 VITALS
SYSTOLIC BLOOD PRESSURE: 124 MMHG | OXYGEN SATURATION: 99 % | HEART RATE: 69 BPM | RESPIRATION RATE: 20 BRPM | BODY MASS INDEX: 30.98 KG/M2 | TEMPERATURE: 98 F | DIASTOLIC BLOOD PRESSURE: 87 MMHG | WEIGHT: 209.19 LBS | HEIGHT: 69 IN

## 2024-09-12 DIAGNOSIS — M12.80 HLA-B27 POSITIVE ARTHROPATHY: ICD-10-CM

## 2024-09-12 DIAGNOSIS — Z79.899 HIGH RISK MEDICATION USE: ICD-10-CM

## 2024-09-12 DIAGNOSIS — M54.42 CHRONIC MIDLINE LOW BACK PAIN WITH BILATERAL SCIATICA: ICD-10-CM

## 2024-09-12 DIAGNOSIS — M25.561 CHRONIC PAIN OF RIGHT KNEE: ICD-10-CM

## 2024-09-12 DIAGNOSIS — R53.83 FATIGUE, UNSPECIFIED TYPE: ICD-10-CM

## 2024-09-12 DIAGNOSIS — M06.09 RHEUMATOID ARTHRITIS, SERONEGATIVE, MULTIPLE SITES: ICD-10-CM

## 2024-09-12 DIAGNOSIS — M1A.09X0 CHRONIC GOUT OF MULTIPLE SITES, UNSPECIFIED CAUSE: ICD-10-CM

## 2024-09-12 DIAGNOSIS — M54.41 CHRONIC MIDLINE LOW BACK PAIN WITH BILATERAL SCIATICA: ICD-10-CM

## 2024-09-12 DIAGNOSIS — A53.0 POSITIVE SEROLOGY FOR SYPHILIS: ICD-10-CM

## 2024-09-12 DIAGNOSIS — Z79.899 DRUG-INDUCED IMMUNODEFICIENCY: ICD-10-CM

## 2024-09-12 DIAGNOSIS — D84.821 DRUG-INDUCED IMMUNODEFICIENCY: ICD-10-CM

## 2024-09-12 DIAGNOSIS — M21.42 PES PLANUS OF BOTH FEET: ICD-10-CM

## 2024-09-12 DIAGNOSIS — M21.41 PES PLANUS OF BOTH FEET: ICD-10-CM

## 2024-09-12 DIAGNOSIS — G89.29 CHRONIC MIDLINE LOW BACK PAIN WITH BILATERAL SCIATICA: ICD-10-CM

## 2024-09-12 DIAGNOSIS — M25.521 RIGHT ELBOW PAIN: ICD-10-CM

## 2024-09-12 DIAGNOSIS — M12.80 HLA-B27 POSITIVE ARTHROPATHY: Primary | ICD-10-CM

## 2024-09-12 DIAGNOSIS — Z11.3 SCREENING FOR STD (SEXUALLY TRANSMITTED DISEASE): ICD-10-CM

## 2024-09-12 DIAGNOSIS — G89.29 CHRONIC PAIN OF RIGHT KNEE: ICD-10-CM

## 2024-09-12 LAB
25(OH)D3+25(OH)D2 SERPL-MCNC: 43 NG/ML (ref 30–80)
ALBUMIN SERPL-MCNC: 4 G/DL (ref 3.5–5)
ALBUMIN/GLOB SERPL: 1.3 RATIO (ref 1.1–2)
ALP SERPL-CCNC: 91 UNIT/L (ref 40–150)
ALT SERPL-CCNC: 37 UNIT/L (ref 0–55)
ANION GAP SERPL CALC-SCNC: 7 MEQ/L
AST SERPL-CCNC: 22 UNIT/L (ref 5–34)
BASOPHILS # BLD AUTO: 0.03 X10(3)/MCL
BASOPHILS NFR BLD AUTO: 0.4 %
BILIRUB SERPL-MCNC: 0.3 MG/DL
BUN SERPL-MCNC: 12.9 MG/DL (ref 8.9–20.6)
CALCIUM SERPL-MCNC: 9.6 MG/DL (ref 8.4–10.2)
CHLORIDE SERPL-SCNC: 109 MMOL/L (ref 98–107)
CO2 SERPL-SCNC: 26 MMOL/L (ref 22–29)
CREAT SERPL-MCNC: 1.01 MG/DL (ref 0.73–1.18)
CREAT/UREA NIT SERPL: 13
CRP SERPL-MCNC: 1.9 MG/L
EOSINOPHIL # BLD AUTO: 0.07 X10(3)/MCL (ref 0–0.9)
EOSINOPHIL NFR BLD AUTO: 1 %
ERYTHROCYTE [DISTWIDTH] IN BLOOD BY AUTOMATED COUNT: 13.7 % (ref 11.5–17)
ERYTHROCYTE [SEDIMENTATION RATE] IN BLOOD: 4 MM/HR (ref 0–15)
GFR SERPLBLD CREATININE-BSD FMLA CKD-EPI: >60 ML/MIN/1.73/M2
GLOBULIN SER-MCNC: 3 GM/DL (ref 2.4–3.5)
GLUCOSE SERPL-MCNC: 107 MG/DL (ref 74–100)
HCT VFR BLD AUTO: 42.9 % (ref 42–52)
HGB BLD-MCNC: 15.2 G/DL (ref 14–18)
HIV 1+2 AB+HIV1 P24 AG SERPL QL IA: NONREACTIVE
IMM GRANULOCYTES # BLD AUTO: 0.01 X10(3)/MCL (ref 0–0.04)
IMM GRANULOCYTES NFR BLD AUTO: 0.1 %
LYMPHOCYTES # BLD AUTO: 2.29 X10(3)/MCL (ref 0.6–4.6)
LYMPHOCYTES NFR BLD AUTO: 34.1 %
MCH RBC QN AUTO: 30.8 PG (ref 27–31)
MCHC RBC AUTO-ENTMCNC: 35.4 G/DL (ref 33–36)
MCV RBC AUTO: 87 FL (ref 80–94)
MONOCYTES # BLD AUTO: 0.54 X10(3)/MCL (ref 0.1–1.3)
MONOCYTES NFR BLD AUTO: 8 %
NEUTROPHILS # BLD AUTO: 3.78 X10(3)/MCL (ref 2.1–9.2)
NEUTROPHILS NFR BLD AUTO: 56.4 %
NRBC BLD AUTO-RTO: 0 %
PLATELET # BLD AUTO: 197 X10(3)/MCL (ref 130–400)
PMV BLD AUTO: 10.1 FL (ref 7.4–10.4)
POTASSIUM SERPL-SCNC: 4.2 MMOL/L (ref 3.5–5.1)
PROT SERPL-MCNC: 7 GM/DL (ref 6.4–8.3)
RBC # BLD AUTO: 4.93 X10(6)/MCL (ref 4.7–6.1)
SODIUM SERPL-SCNC: 142 MMOL/L (ref 136–145)
TSH SERPL-ACNC: 0.56 UIU/ML (ref 0.35–4.94)
WBC # BLD AUTO: 6.72 X10(3)/MCL (ref 4.5–11.5)

## 2024-09-12 PROCEDURE — 3074F SYST BP LT 130 MM HG: CPT | Mod: CPTII,,, | Performed by: NURSE PRACTITIONER

## 2024-09-12 PROCEDURE — 36415 COLL VENOUS BLD VENIPUNCTURE: CPT

## 2024-09-12 PROCEDURE — 99214 OFFICE O/P EST MOD 30 MIN: CPT | Mod: S$PBB,,, | Performed by: NURSE PRACTITIONER

## 2024-09-12 PROCEDURE — 86140 C-REACTIVE PROTEIN: CPT

## 2024-09-12 PROCEDURE — 80053 COMPREHEN METABOLIC PANEL: CPT

## 2024-09-12 PROCEDURE — 3079F DIAST BP 80-89 MM HG: CPT | Mod: CPTII,,, | Performed by: NURSE PRACTITIONER

## 2024-09-12 PROCEDURE — 1159F MED LIST DOCD IN RCRD: CPT | Mod: CPTII,,, | Performed by: NURSE PRACTITIONER

## 2024-09-12 PROCEDURE — 85025 COMPLETE CBC W/AUTO DIFF WBC: CPT

## 2024-09-12 PROCEDURE — 3008F BODY MASS INDEX DOCD: CPT | Mod: CPTII,,, | Performed by: NURSE PRACTITIONER

## 2024-09-12 PROCEDURE — 1160F RVW MEDS BY RX/DR IN RCRD: CPT | Mod: CPTII,,, | Performed by: NURSE PRACTITIONER

## 2024-09-12 PROCEDURE — 87389 HIV-1 AG W/HIV-1&-2 AB AG IA: CPT

## 2024-09-12 PROCEDURE — 84443 ASSAY THYROID STIM HORMONE: CPT

## 2024-09-12 PROCEDURE — 82306 VITAMIN D 25 HYDROXY: CPT

## 2024-09-12 PROCEDURE — 99214 OFFICE O/P EST MOD 30 MIN: CPT | Mod: PBBFAC | Performed by: NURSE PRACTITIONER

## 2024-09-12 PROCEDURE — 85652 RBC SED RATE AUTOMATED: CPT

## 2024-09-12 RX ORDER — SECUKINUMAB 150 MG/ML
INJECTION SUBCUTANEOUS
Qty: 1 ML | Refills: 5 | Status: SHIPPED | OUTPATIENT
Start: 2024-09-12

## 2024-09-12 RX ORDER — METHOTREXATE 2.5 MG/1
TABLET ORAL
Qty: 40 TABLET | Refills: 2 | Status: SHIPPED | OUTPATIENT
Start: 2024-09-12

## 2024-09-12 RX ORDER — FOLIC ACID 1 MG/1
1 TABLET ORAL DAILY
Qty: 30 TABLET | Refills: 6 | Status: SHIPPED | OUTPATIENT
Start: 2024-09-12 | End: 2024-12-11

## 2024-09-13 ENCOUNTER — TELEPHONE (OUTPATIENT)
Dept: RHEUMATOLOGY | Facility: CLINIC | Age: 44
End: 2024-09-13
Payer: MEDICARE

## 2024-09-13 NOTE — TELEPHONE ENCOUNTER
SPOKE TO PT INFORMED OF MESSAGE. PT VERBALIZED UNDERSTANDING              ----- Message from CATRACHITA Aguayo sent at 9/12/2024 12:22 PM CDT -----  Please advise the patient that all lab are noted to be clinically acceptable at this time, TSH, Vitamin D both ok, HIV negative- please enc him to reach out to his PCP as we discussed for possible at home sleep study to r/o INEZ, we will continue to monitor at future lab draws

## 2024-11-11 DIAGNOSIS — M06.09 RHEUMATOID ARTHRITIS, SERONEGATIVE, MULTIPLE SITES: ICD-10-CM

## 2024-11-11 DIAGNOSIS — Z79.899 DRUG-INDUCED IMMUNODEFICIENCY: ICD-10-CM

## 2024-11-11 DIAGNOSIS — D84.821 DRUG-INDUCED IMMUNODEFICIENCY: ICD-10-CM

## 2024-11-11 DIAGNOSIS — Z79.899 HIGH RISK MEDICATION USE: ICD-10-CM

## 2024-11-11 DIAGNOSIS — M12.80 HLA-B27 POSITIVE ARTHROPATHY: ICD-10-CM

## 2024-11-11 RX ORDER — METHOTREXATE 2.5 MG/1
TABLET ORAL
Qty: 40 TABLET | Refills: 2 | Status: SHIPPED | OUTPATIENT
Start: 2024-11-11

## 2024-12-12 ENCOUNTER — OFFICE VISIT (OUTPATIENT)
Dept: RHEUMATOLOGY | Facility: CLINIC | Age: 44
End: 2024-12-12
Payer: MEDICARE

## 2024-12-12 ENCOUNTER — LAB VISIT (OUTPATIENT)
Dept: LAB | Facility: HOSPITAL | Age: 44
End: 2024-12-12
Attending: INTERNAL MEDICINE
Payer: MEDICARE

## 2024-12-12 VITALS
HEART RATE: 88 BPM | SYSTOLIC BLOOD PRESSURE: 128 MMHG | HEIGHT: 69 IN | OXYGEN SATURATION: 98 % | TEMPERATURE: 98 F | RESPIRATION RATE: 20 BRPM | DIASTOLIC BLOOD PRESSURE: 81 MMHG | WEIGHT: 210.63 LBS | BODY MASS INDEX: 31.2 KG/M2

## 2024-12-12 DIAGNOSIS — M12.80 HLA-B27 POSITIVE ARTHROPATHY: ICD-10-CM

## 2024-12-12 DIAGNOSIS — M21.42 ACQUIRED PES PLANUS OF LEFT FOOT: ICD-10-CM

## 2024-12-12 DIAGNOSIS — M21.41 ACQUIRED PES PLANUS OF RIGHT FOOT: ICD-10-CM

## 2024-12-12 DIAGNOSIS — M1A.09X0 CHRONIC GOUT OF MULTIPLE SITES, UNSPECIFIED CAUSE: ICD-10-CM

## 2024-12-12 DIAGNOSIS — D84.821 DRUG-INDUCED IMMUNODEFICIENCY: ICD-10-CM

## 2024-12-12 DIAGNOSIS — M12.80 HLA-B27 POSITIVE ARTHROPATHY: Primary | ICD-10-CM

## 2024-12-12 DIAGNOSIS — A53.0 POSITIVE SEROLOGY FOR SYPHILIS: ICD-10-CM

## 2024-12-12 DIAGNOSIS — M06.09 RHEUMATOID ARTHRITIS, SERONEGATIVE, MULTIPLE SITES: ICD-10-CM

## 2024-12-12 DIAGNOSIS — Z79.899 DRUG-INDUCED IMMUNODEFICIENCY: ICD-10-CM

## 2024-12-12 DIAGNOSIS — Z79.899 HIGH RISK MEDICATION USE: ICD-10-CM

## 2024-12-12 LAB
ALBUMIN SERPL-MCNC: 4.4 G/DL (ref 3.5–5)
ALBUMIN/GLOB SERPL: 1.3 RATIO (ref 1.1–2)
ALP SERPL-CCNC: 113 UNIT/L (ref 40–150)
ALT SERPL-CCNC: 38 UNIT/L (ref 0–55)
ANION GAP SERPL CALC-SCNC: 8 MEQ/L
AST SERPL-CCNC: 25 UNIT/L (ref 5–34)
BASOPHILS # BLD AUTO: 0.05 X10(3)/MCL
BASOPHILS NFR BLD AUTO: 0.5 %
BILIRUB SERPL-MCNC: 0.6 MG/DL
BUN SERPL-MCNC: 11.4 MG/DL (ref 8.9–20.6)
CALCIUM SERPL-MCNC: 9.8 MG/DL (ref 8.4–10.2)
CHLORIDE SERPL-SCNC: 106 MMOL/L (ref 98–107)
CO2 SERPL-SCNC: 27 MMOL/L (ref 22–29)
CREAT SERPL-MCNC: 1.19 MG/DL (ref 0.72–1.25)
CREAT/UREA NIT SERPL: 10
EOSINOPHIL # BLD AUTO: 0.06 X10(3)/MCL (ref 0–0.9)
EOSINOPHIL NFR BLD AUTO: 0.7 %
ERYTHROCYTE [DISTWIDTH] IN BLOOD BY AUTOMATED COUNT: 13.5 % (ref 11.5–17)
GFR SERPLBLD CREATININE-BSD FMLA CKD-EPI: >60 ML/MIN/1.73/M2
GLOBULIN SER-MCNC: 3.3 GM/DL (ref 2.4–3.5)
GLUCOSE SERPL-MCNC: 95 MG/DL (ref 74–100)
HCT VFR BLD AUTO: 46.1 % (ref 42–52)
HGB BLD-MCNC: 15.8 G/DL (ref 14–18)
IMM GRANULOCYTES # BLD AUTO: 0.02 X10(3)/MCL (ref 0–0.04)
IMM GRANULOCYTES NFR BLD AUTO: 0.2 %
LYMPHOCYTES # BLD AUTO: 2.65 X10(3)/MCL (ref 0.6–4.6)
LYMPHOCYTES NFR BLD AUTO: 29 %
MCH RBC QN AUTO: 29.8 PG (ref 27–31)
MCHC RBC AUTO-ENTMCNC: 34.3 G/DL (ref 33–36)
MCV RBC AUTO: 87 FL (ref 80–94)
MONOCYTES # BLD AUTO: 0.82 X10(3)/MCL (ref 0.1–1.3)
MONOCYTES NFR BLD AUTO: 9 %
NEUTROPHILS # BLD AUTO: 5.55 X10(3)/MCL (ref 2.1–9.2)
NEUTROPHILS NFR BLD AUTO: 60.6 %
NRBC BLD AUTO-RTO: 0 %
PLATELET # BLD AUTO: 220 X10(3)/MCL (ref 130–400)
PMV BLD AUTO: 9.7 FL (ref 7.4–10.4)
POTASSIUM SERPL-SCNC: 4.3 MMOL/L (ref 3.5–5.1)
PROT SERPL-MCNC: 7.7 GM/DL (ref 6.4–8.3)
RBC # BLD AUTO: 5.3 X10(6)/MCL (ref 4.7–6.1)
SODIUM SERPL-SCNC: 141 MMOL/L (ref 136–145)
WBC # BLD AUTO: 9.15 X10(3)/MCL (ref 4.5–11.5)

## 2024-12-12 PROCEDURE — 80053 COMPREHEN METABOLIC PANEL: CPT

## 2024-12-12 PROCEDURE — G2211 COMPLEX E/M VISIT ADD ON: HCPCS | Mod: S$PBB,,, | Performed by: INTERNAL MEDICINE

## 2024-12-12 PROCEDURE — 36415 COLL VENOUS BLD VENIPUNCTURE: CPT

## 2024-12-12 PROCEDURE — 3079F DIAST BP 80-89 MM HG: CPT | Mod: CPTII,,, | Performed by: INTERNAL MEDICINE

## 2024-12-12 PROCEDURE — 3074F SYST BP LT 130 MM HG: CPT | Mod: CPTII,,, | Performed by: INTERNAL MEDICINE

## 2024-12-12 PROCEDURE — 85025 COMPLETE CBC W/AUTO DIFF WBC: CPT

## 2024-12-12 PROCEDURE — 99213 OFFICE O/P EST LOW 20 MIN: CPT | Mod: PBBFAC | Performed by: INTERNAL MEDICINE

## 2024-12-12 PROCEDURE — 3008F BODY MASS INDEX DOCD: CPT | Mod: CPTII,,, | Performed by: INTERNAL MEDICINE

## 2024-12-12 PROCEDURE — 1159F MED LIST DOCD IN RCRD: CPT | Mod: CPTII,,, | Performed by: INTERNAL MEDICINE

## 2024-12-12 PROCEDURE — 99214 OFFICE O/P EST MOD 30 MIN: CPT | Mod: S$PBB,,, | Performed by: INTERNAL MEDICINE

## 2024-12-12 RX ORDER — SECUKINUMAB 150 MG/ML
INJECTION SUBCUTANEOUS
Qty: 1 ML | Refills: 5 | Status: SHIPPED | OUTPATIENT
Start: 2024-12-12

## 2024-12-12 RX ORDER — METHOTREXATE 2.5 MG/1
TABLET ORAL
Qty: 40 TABLET | Refills: 2 | Status: SHIPPED | OUTPATIENT
Start: 2024-12-12

## 2024-12-12 NOTE — PROGRESS NOTES
Patient ID: [id number]     Chief Complaint: HLA-B27 positive arthropathy (Pt stated having a gout flare in feet)    HPI:     Trey Harper is a 44 y.o. male here today for follow up of spondyloarthritis, +HLA-B27.      Presents today for a follow up of +HLA-B27 Spondyloarthritis/reactive arthritis diagnosed in 2022 after presentation to ED on 11/22/2022 with c/o of pain and swelling in left wrist, left hand, bilateral ankles and right knee.  History of bloody diarrhea few weeks prior to that presentation.  He was treated with Ciprofloxacin and Flagyl at the time. History of septic arthritis in right knee when he was 5 to 6 years old. He previously tested positive for Syphilis and was treated with Penicillin in 9/2022.  He was initially started on Humira but developed secondary failure.      Here today for follow up. Currently taking Methotrexate 20 mg per week with folic acid 1 mg daily and Cosentyx 150 mg subQ every 4 weeks. He denies any red/warm/swollen joints, morning stiffness lasting roughly 15-20 minutes. Tolerating medications well.   C/o pain in bilateral feet, mid foot, when he walks, better with stretching for last 1-2 months. PCP told him that its gout. Educated its not gout pain in feet secondary to flat fee and recommend seeing podiatry.   He has seen Podiatry in the past and was told he needed braces for his ankles, followed by Dr. Garvin (podiatrist) in [place]. He was also told he has severe flat feet bilaterally and needs custom fit orthotics, he can not afford that.    He was also started on Allopurinol 100 mg po qd by his PCP.   Continues to have intermittent back pain, worse when walking and standing on feet. Reports back pain at night, has a hard time falling asleep due to pain and getting comfortable, when he does fall asleep he states it is very short lived due to pain waking him up.     He has continued to have issues with his feet, mainly with walking, recently visited the [facility] and had some inserts made so hoping this helps. Has also seen Podiatry in the past and was told he needed braces for his ankles, followed by Dr. Garvin (podiatrist) in [place]- he has tried to wear but has been uncomfortable so admits not wearing as directed.     June 2024: His feet continue to bother him at times, he was seeing [facility] in the past but has not seen him in a while, last discussed Orthotics and does have but has issues with blisters when uses. Back pain is not bothering him as much now, will have some aches and pains here and there but for the most part has been much better with meds. He has also been having right elbow pain for the past 2 months, comes and goes, denies injury. Reports when he bends his elbow he can feel pain, feels pain is deep to medial aspect, he is right hand dominant. He has not taken anything for pain.    September 2024: Here today for follow-up visit. Currently taking Methotrexate 20 mg per week with folic acid 1 mg daily and Cosentyx 150 mg subQ every 4 weeks. He denies any red/warm/swollen joints, morning stiffness lasting roughly 15-20 minutes. He reports he will have some swelling in his feet at times if he is on them for long period- he will ice his feet at night time and this does help. He does report an increase in fatigue over the past few months- states he has been sleeping well but states when he wakes up he is not feeling rested- he did just have an apt with his PCP 2 days ago and is awaiting lab results- however did not mention to her the fatigue at that time.       Denies any recent illness or hospitalizations since last visit.     Dr. Robb in [place]- Podiatry  Dr. Burgos- Neurospinal  Dr. Olea- Ophthalmology     PMH: GERD and chronic right knee pain.    Denies fevers, rashes, oral and nasal ulcers, history of DVT or PE, history of stroke or seizures, history of MI, history of malignancies, Raynaud's phenomenon, history of inflammatory eye  diseases, history of inflammatory bowel disease.  Family history of autoimmune disease: Pat Aunt with RA.   Smoking: none     Social History     Tobacco Use   Smoking Status Never   Smokeless Tobacco Never          -------------------------------------    Arthritis    Gout, unspecified    Other specified noninfective gastroenteritis and colitis    Syphilis, unspecified        Past Surgical History:   Procedure Laterality Date    SPINE SURGERY  09/19/2022       Review of patient's allergies indicates:  No Known Allergies    Outpatient Medications Marked as Taking for the 12/12/24 encounter (Office Visit) with Kojo Lopez MD   Medication Sig Dispense Refill    ALCOHOL PREP PADS PadM SMARTSIG:Pledget(s) Topical      allopurinoL (ZYLOPRIM) 100 MG tablet Take 100 mg by mouth Daily.      azelastine (ASTELIN) 137 mcg (0.1 %) nasal spray 1 spray (137 mcg total) by Nasal route 2 (two) times daily. (Patient taking differently: 1 spray by Nasal route as needed.) 90 mL 3    cetirizine (ZYRTEC) 10 MG tablet Take 1 tablet (10 mg total) by mouth once daily. (Patient taking differently: Take 10 mg by mouth as needed.) 90 tablet 3    folic acid (FOLVITE) 1 MG tablet Take 1 tablet (1 mg total) by mouth once daily. 30 tablet 6    meloxicam (MOBIC) 7.5 MG tablet TAKE ONE TABLET BY MOUTH ONCE A DAY for 90 days      multivit with min-folic acid 0.4 mg Tab 1 tablet Orally Once a day      PROTONIX 40 mg tablet Take 40 mg by mouth once daily.      SHARPS CONTAINER use as directed      [DISCONTINUED] COSENTYX  mg/mL PnIj Inject 150 mg subq qmonth. Hold for fever or infections. 1 mL 5    [DISCONTINUED] methotrexate 2.5 MG Tab Take 4 tablets on Friday morning and 4 tablets on Friday night, once a week. Hold for infection or fever. 40 tablet 2       Social History     Socioeconomic History    Marital status: Single   Tobacco Use    Smoking status: Never    Smokeless tobacco: Never   Substance and Sexual Activity    Alcohol use: Yes      Comment: occ    Drug use: Never    Sexual activity: Not Currently     Partners: Male     Birth control/protection: None        Family History   Problem Relation Name Age of Onset    Cancer Father Esophagus cancer     Rheum arthritis Paternal Aunt      Rheum arthritis Paternal Grandmother          Immunization History   Administered Date(s) Administered    DT (Pediatric) 12/17/1984, 09/30/1994    DTP 1980, 1980, 1980, 10/22/1981    MMR 06/25/1981    OPV 1980, 1980, 1980, 10/22/1981       Patient Care Team:  Prisca Contreras MD as PCP - General (Internal Medicine)     Subjective:     ROS    Constitutional:  Denies chills. Denies fever. Denies night sweats. Denies weight loss.   Ophthalmology: Denies blurred vision. Denies dry eyes. Denies eye pain. Denies Itching and redness.   ENT: Denies oral ulcers. Denies epistaxis. Denies dry mouth. Denies swollen glands.   Endocrine: Denies diabetes. Denies thyroid Problems.   Respiratory: Admits dry cough- has PND right now. Denies shortness of breath. Denies shortness of breath with exertion. Denies hemoptysis.   Cardiovascular: Denies chest pain at rest. Denies chest pain with exertion. Denies palpitations.    Gastrointestinal: Denies abdominal pain. Denies diarrhea. Denies nausea. Denies vomiting. Denies hematemesis or hematochezia. Denies heartburn.  Genitourinary: Denies blood in urine.  Musculoskeletal: See HPI for details  Integumentary: Denies rash. Denies photosensitivity.   Peripheral Vascular: Denies Ulcers of hands and/or feet. Denies Cold extremities.   Neurologic: Denies dizziness. Denies headache.  Denies loss of strength. Denies numbness or tingling.   Psychiatric: Denies depression and anxiety- doing much better since he is feeling better. Denies suicidal/homicidal ideations.      Objective:     /81 (BP Location: Right arm, Patient Position: Sitting)   Pulse 88   Temp 98.1 °F (36.7 °C) (Oral)   Resp 20   Ht  "5' 9" (1.753 m)   Wt 95.5 kg (210 lb 9.6 oz)   SpO2 98%   BMI 31.10 kg/m²     Physical Exam    General Appearance: alert, pleasant, in no acute distress.  Skin: Skin color, texture, turgor normal. No rashes or lesions.  Eyes:  extraocular movement intact (EOMI), pupils equal, round, reactive to light and accommodation, conjunctiva clear.  ENT: No oral or nasal ulcers.  Neck:  Neck supple. No adenopathy.   Lungs: CTA throughout without crackles, rhonchi, or wheezes.   Heart: RRR w/o murmurs.  No edema.   Neuro: Alert, oriented, CN II-XII GI, sensory and motor innervation intact.  Musculoskeletal: No pain to bilateral MCPs, FROM noted to wrists and shoulders with no pain, medial epicondyle tenderness to right elbow, left with no pain, FROM noted bilaterally, + crepitus noted bilateral knees. No pain to bilateral MTPs. Pes planus noted to bilateral feet with R>L.    Psych: Alert, oriented, normal eye contact.    Labs:   11/22/2022: Hep B and C negative. HIV negative. Uric acid normal. ANCA negative. HLAB 27 positive. RPR and syphilis ab positive.  Chlamydia and gonorrhea PCR negative.  BASIL negative.  Rheumatoid factor and anti CCP negative.  EBV IgM negative, IgG positive.  Parvovirus B19 not detected.  EBV DNA not detected. Blood culture negative.     12/03/2022:  WBC count 17.7.  Hemoglobin 13.4.  Platelet count elevated 447.  ESR elevated 128, normal less than 15.  Calcium slightly elevated 10.8.  Alk phos 172.  CMP okay.  ALT upper limit of normal.  CRP elevated 131.    1/17/23:  CBC, CMP okay.  ESR, CRP normal. Quantiferon tb negative.     3/27/23: CBC ok. ESR 21, . CMP ok.     4/17/23:  1+ protein and no blood in urine.   milligram/gram.  Urine chlamydia gonorrhea PCR negative.    5/8/2023 CMP Creat 1.22 , Calcium 10.6, otherwise ok. CRP 97.50 (previous 203.70, normal <5). CBC hgb 13.4. platelet 444 (previous 275), otherwise ok. Sed Rate 24 (<15). Uric Acid 7 (previous 4.9).     7/11/2023 CBC ok. " CMP ok. Sed Rate 4 (<15), CRP 2.90 (<5). Uric Acid 4.5     2023: CMP okay.  Lupus anticoagulant not detected. Cardiolipin/phospholipid negative.  Beta 2 glycoprotein negative.  CBC okay.  Uric acid normal 4.7.     2023 CBC WBC 12.89, ANC 9.69 (strep), otherwise ok. CMP creat 1.20    2023 CRP 6.80 (<5), Hep B/C/HIV negative, Uric Acid 6.1, Sed Rate 10 (<15), CRP 6.80 (<5), Quantiferon negative    3/26/2024 CMP ok. CBC ok.     2024 CMP ok. CBC ok. CRP 2 (<5), Uric Acid 6.3 ok. Sed Rate 3 (<15). Chlamydia and GC negative.     2024 CMP okay, HIV negative, vitamin-D 43 okay, TSH 0.561 okay, CRP 1.9 (<5), ESR 4 (< 15), CBC okay    Imagin22:  X-ray of left wrist showed possible small avulsion injury at the distal tip of the ulnar styloid process, no erosions.  X-ray of left ankle showed soft tissue swelling on lateral side.  Normal x-ray of right ankle.  Normal x-ray of right knee.    23:  Normal chest x-ray.  X-ray of right hand showed mild DJD.  X-ray of left hand showed DJD.     2022:  Echocardiogram showed EF 64%, normal ejection fraction.  Normal right ventricular function.  Normal left ventricular systolic and diastolic function.    2023: SI Joint Xray: Mild SI Joint sclerosis     23:  Mild DJD of left knee.  DJD changes seen in right knee.    2023 Right Foot Xray: Impression: No acute abnormalities are seen  Left Foot Xray: There is spurring of the insertion of the Achilles tendon.  There are changes suspicious for an early primary hallux valgus. Impression: Changes consistent with primary hallux valgus.    2024 Right Elbow Xray: Impression: No acute osseous abnormality.  Assessment:       ICD-10-CM ICD-9-CM   1. HLA-B27 positive arthropathy  M12.80 716.80   2. Rheumatoid arthritis, seronegative, multiple sites  M06.09 714.0   3. Drug-induced immunodeficiency  D84.821 279.3    Z79.899 E947.9   4. Positive serology for syphilis  A53.0 097.1   5.  Chronic gout of multiple sites, unspecified cause  M1A.09X0 274.02   6. High risk medication use  Z79.899 V58.69           Plan:     1. HLA-B27 positive arthropathy  Diagnosed in 2022 after presentation to ED on 11/22/2022 with c/o of pain and swelling in left wrist, left hand, bilateral ankles and right knee. Started with pain and swelling in left wrist and progress to bilateral ankles and right knee.  Denies fevers or recent travel. History of bloody diarrhea in second week of 11/2022 in which he presented to ER for eval.  Negative RF and anti CCP. BASIL negative. HLA B27 Positive. Work up negative for EBV, Parvovirus, Chlamydia and Gonorrhea and blood cultures negative. No rashes, ECHO normal with no vegetations. Hep panel, HIV negative. Started on MTX in November of 2022, Humira added in December of 2022- worked well until March of 2023 secondary failure. Limited ROM of left wrist, repeat X-ray Jan 2023 showed showed DJD. Started Cosentyx June 2023 and tolerating well, also continues MTX 8 tab po qweek with folic acid 1 mg po qd.  Today on exam, no active synovitis noted.     -Continue  MTX 20 mg po qweek and folic acid 1 mg daily.   -Continue Cosentyx 150 mg subq every 4 weeks. If needed can increase dose of Cosentyx.  -Chest Xray ok 1/2023  -Lab today for continued monitoring       2. Rheumatoid arthritis, seronegative, multiple sites   -See above     3. High risk medication use/Drug Induced Immunodeficiency   -Advised to stay up-to-date on age appropriate vaccinations and malignancy screening with PCP.   -Persons with rheumatoid arthritis, lupus, psoriatic arthritis and other autoimmune diseases are at increased risk of cardiovascular disease including heart attack and stroke. We recommend that all patients with these conditions have annual health maintenance exams including lipid measurements, blood pressure measurements, and smoking cessation counseling when applicable at their primary care provider's office.    -Continued lab monitoring.   -Reminded to hold meds of Cosentyx and MTX for any fever or infections, also monitor ETOH with MTX to 1-2 drinks a week.   - refused vaccines.     4. Chronic pain of right knee and secondary DJD   -Secondary to infection he had as a kid and secondary arthritis.   -9/18/23:  Mild DJD of left knee.  DJD changes seen in right knee.   -Currently stable at this time     5. History of Syphilis  -Treated with [facility]N in [facility] at health unit.      6. 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 Unsure procedure- on September 2022 with Dr. Burgos- has continued to follow up  -Completed PT which he reports has been very helpful - stable     7. Pes Planus  -Bilateral feet with R>L- currently followed by Dr. Robb in [place] (podiatry)- recommended orthotics. May be contributing to some of his foot pain.   -12/18/2023 Right Foot Xray: Impression: No acute abnormalities are seen  Left Foot Xray: There is spurring of the insertion of the Achilles tendon.  There are changes suspicious for an early primary hallux valgus. Impression: Changes consistent with primary hallux valgus  -Enc to continued to follow up with Dr Robb  - educated bilateral feet pain secondary to flatfoot, poor midfoot and ankle 's and not because of gout     8. Gout  -Currently treated by his PCP as noted Uric acid elevated in May 2023 at 7, recent Uric Acid December 2023 6.1  -Continue Allopurinol 100 mg po qd with PCP as directed.         Follow up in about 6 months (around 6/12/2025). In addition to their scheduled follow up, the patient has also been instructed to follow up on as needed basis.     Total time spent with patient and documentation is 35 minutes. All questions were answered to patient's satisfaction and patient verbalized understanding.

## 2025-01-09 DIAGNOSIS — D84.821 DRUG-INDUCED IMMUNODEFICIENCY: ICD-10-CM

## 2025-01-09 DIAGNOSIS — Z79.899 HIGH RISK MEDICATION USE: ICD-10-CM

## 2025-01-09 DIAGNOSIS — M06.09 RHEUMATOID ARTHRITIS, SERONEGATIVE, MULTIPLE SITES: ICD-10-CM

## 2025-01-09 DIAGNOSIS — Z79.899 DRUG-INDUCED IMMUNODEFICIENCY: ICD-10-CM

## 2025-01-09 DIAGNOSIS — M12.80 HLA-B27 POSITIVE ARTHROPATHY: ICD-10-CM

## 2025-01-09 NOTE — TELEPHONE ENCOUNTER
Spoke to pt informed of lab work that needs to be done. Pt verbalized understanding  stated will do lab work next  of 1/13/25.

## 2025-01-10 RX ORDER — METHOTREXATE 2.5 MG/1
TABLET ORAL
Qty: 40 TABLET | Refills: 2 | Status: SHIPPED | OUTPATIENT
Start: 2025-01-10

## 2025-01-10 NOTE — TELEPHONE ENCOUNTER
Script refilled. He did lab work in December 2024 and that it normal. Why does he need to do labs again?

## 2025-02-06 ENCOUNTER — PATIENT MESSAGE (OUTPATIENT)
Dept: RHEUMATOLOGY | Facility: CLINIC | Age: 45
End: 2025-02-06
Payer: MEDICARE

## 2025-02-06 NOTE — TELEPHONE ENCOUNTER
Addressed patient concern on Methotrexate dosing. Patient had extra MTX due to auto-fill from his pharmacy

## 2025-02-25 NOTE — PROGRESS NOTES
Patient ID: [id number]     Chief Complaint: HLA-B27 positive arthropathy (Pt stated having feet pain when walking)    HPI:     Trey Harper is a 45 y.o. male here today for follow up of spondyloarthritis, +HLA-B27.      Presents today for a follow up of +HLA-B27 Spondyloarthritis/reactive arthritis diagnosed in 2022 after presentation to ED on 11/22/2022 with c/o of pain and swelling in left wrist, left hand, bilateral ankles and right knee.  History of bloody diarrhea few weeks prior to that presentation.  He was treated with Ciprofloxacin and Flagyl at the time. History of septic arthritis in right knee when he was 5 to 6 years old. He previously tested positive for Syphilis and was treated with Penicillin in 9/2022.  He was initially started on Humira but developed secondary failure.      Here today for follow up. Currently taking Methotrexate 20 mg per week with folic acid 1 mg daily and Cosentyx 150 mg subQ every 4 weeks. He denies any red/warm/swollen joints, morning stiffness lasting roughly 15-20 minutes. Tolerating medications well.   C/o pain in bilateral feet, mid foot, when he walks, better with stretching for last 1-2 months. PCP told him that its gout. Educated its not gout pain in feet secondary to flat fee and recommend seeing podiatry.   He has seen Podiatry in the past and was told he needed braces for his ankles, followed by Dr. Garvin (podiatrist) in [place]. He was also told he has severe flat feet bilaterally and needs custom fit orthotics, he can not afford that.    He was also started on Allopurinol 100 mg po qd by his PCP.   Continues to have intermittent back pain, worse when walking and standing on feet. Reports back pain at night, has a hard time falling asleep due to pain and getting comfortable, when he does fall asleep he states it is very short lived due to pain waking him up.     He has continued to have issues with his feet, mainly with walking, recently visited the [facility] and had some inserts made so hoping this helps. Has also seen Podiatry in the past and was told he needed braces for his ankles, followed by Dr. Garvin (podiatrist) in [place]- he has tried to wear but has been uncomfortable so admits not wearing as directed.     June 2024: His feet continue to bother him at times, he was seeing Meghnae in the past but has not seen him in a while, last discussed Orthotics and does have but has issues with blisters when uses. Back pain is not bothering him as much now, will have some aches and pains here and there but for the most part has been much better with meds. He has also been having right elbow pain for the past 2 months, comes and goes, denies injury. Reports when he bends his elbow he can feel pain, feels pain is deep to medial aspect, he is right hand dominant. He has not taken anything for pain.    September 2024: Here today for follow-up visit. Currently taking Methotrexate 20 mg per week with folic acid 1 mg daily and Cosentyx 150 mg subQ every 4 weeks. He denies any red/warm/swollen joints, morning stiffness lasting roughly 15-20 minutes. He reports he will have some swelling in his feet at times if he is on them for long period- he will ice his feet at night time and this does help. He does report an increase in fatigue over the past few months- states he has been sleeping well but states when he wakes up he is not feeling rested- he did just have an apt with his PCP 2 days ago and is awaiting lab results- however did not mention to her the fatigue at that time.       February 2025: Here today for follow up. . Currently taking Methotrexate 20 mg per week with folic acid 1 mg daily and Cosentyx 150 mg subQ every 4 weeks. He denies any red/warm/swollen joints, morning stiffness lasting roughly 15-20 minutes. He was having some pain in his feet- seeing Podiatry- was suppose to be wearing braces but states causes blisters- following as needed- did get rx  for custom insoles but needs to have made. He woke up yesterday with left eye redness with crusting and discharge, states very sensitive to light- has an apt today with Dr. Olea for evaluation, does not think foreign object. Denies missing any doses of meds, taking as directed.     Denies any recent illness or hospitalizations since last visit.     Dr. Robb in [place]- Podiatry  Dr. Orta- Neurospinal  Dr. Olea- Ophthalmology     PMH: GERD and chronic right knee pain.    Denies fevers, rashes, oral and nasal ulcers, history of DVT or PE, history of stroke or seizures, history of MI, history of malignancies, Raynaud's phenomenon, history of inflammatory eye diseases, history of inflammatory bowel disease.  Family history of autoimmune disease: Pat Aunt with RA.   Smoking: none     Social History     Tobacco Use   Smoking Status Never   Smokeless Tobacco Never          -------------------------------------    Arthritis    Gout, unspecified    Other specified noninfective gastroenteritis and colitis    Syphilis, unspecified        Past Surgical History:   Procedure Laterality Date    SPINE SURGERY  09/19/2022       Review of patient's allergies indicates:  No Known Allergies    Outpatient Medications Marked as Taking for the 2/26/25 encounter (Office Visit) with Angeles Johns FNP   Medication Sig Dispense Refill    ALCOHOL PREP PADS PadM SMARTSIG:Pledget(s) Topical      allopurinoL (ZYLOPRIM) 100 MG tablet Take 100 mg by mouth Daily.      azelastine (ASTELIN) 137 mcg (0.1 %) nasal spray 1 spray (137 mcg total) by Nasal route 2 (two) times daily. 90 mL 3    cetirizine (ZYRTEC) 10 MG tablet Take 1 tablet (10 mg total) by mouth once daily. 90 tablet 3    COSENTYX  mg/mL PnIj Inject 150 mg subq qmonth. Hold for fever or infections. 1 mL 5    folic acid (FOLVITE) 1 MG tablet Take 1 tablet (1 mg total) by mouth once daily. 30 tablet 6    meloxicam (MOBIC) 7.5 MG tablet TAKE ONE TABLET BY MOUTH  ONCE A DAY for 90 days      methotrexate 2.5 MG Tab Take 4 tablets on Friday morning and 4 tablets on Friday night, once a week. Hold for infection or fever. 40 tablet 2    multivit with min-folic acid 0.4 mg Tab 1 tablet Orally Once a day      PROTONIX 40 mg tablet Take 40 mg by mouth once daily.      SHARPS CONTAINER use as directed         Social History     Socioeconomic History    Marital status: Single   Tobacco Use    Smoking status: Never    Smokeless tobacco: Never   Substance and Sexual Activity    Alcohol use: Yes     Comment: occ    Drug use: Never    Sexual activity: Not Currently     Partners: Male     Birth control/protection: None        Family History   Problem Relation Name Age of Onset    Cancer Father Esophagus cancer     Rheum arthritis Paternal Aunt      Rheum arthritis Paternal Grandmother          Immunization History   Administered Date(s) Administered    DT (Pediatric) 12/17/1984, 09/30/1994    DTP 1980, 1980, 1980, 10/22/1981    MMR 06/25/1981    OPV 1980, 1980, 1980, 10/22/1981       Patient Care Team:  Prisca Contreras MD as PCP - General (Internal Medicine)     Subjective:     ROS    Constitutional:  Denies chills. Denies fever. Denies night sweats. Denies weight loss.   Ophthalmology: Admits blurred vision. Denies dry eyes. Admits eye pain due to sensitivity to light. Denies Itching. Admits redness.   ENT: Denies oral ulcers. Denies epistaxis. Denies dry mouth. Denies swollen glands.   Endocrine: Denies diabetes. Denies thyroid Problems.   Respiratory: Denies cough. Denies shortness of breath. Denies shortness of breath with exertion. Denies hemoptysis.   Cardiovascular: Denies chest pain at rest. Denies chest pain with exertion. Denies palpitations.    Gastrointestinal: Denies abdominal pain. Denies diarrhea. Denies nausea. Denies vomiting. Denies hematemesis or hematochezia. Denies heartburn.  Genitourinary: Denies blood in  "urine.  Musculoskeletal: See HPI for details  Integumentary: Denies rash. Denies photosensitivity.   Peripheral Vascular: Denies Ulcers of hands and/or feet. Denies Cold extremities.   Neurologic: Denies dizziness. Denies headache.  Denies loss of strength. Denies numbness or tingling.   Psychiatric: Denies depression and anxiety- doing much better since he is feeling better. Denies suicidal/homicidal ideations.      Objective:     /87 (BP Location: Left arm, Patient Position: Sitting)   Pulse 81   Temp 98.2 °F (36.8 °C) (Oral)   Resp 20   Ht 5' 9" (1.753 m)   Wt 94.3 kg (207 lb 12.8 oz)   SpO2 98%   BMI 30.69 kg/m²     Physical Exam    General Appearance: alert, pleasant, in no acute distress.  Skin: Skin color, texture, turgor normal. No rashes or lesions.  Eyes:  extraocular movement intact (EOMI), pupils equal, round, reactive to light and accommodation, conjunctiva clear on right, injected and erythematous on left, no discharge noted.  ENT: No oral or nasal ulcers.  Neck:  Neck supple. No adenopathy.   Lungs: CTA throughout without crackles, rhonchi, or wheezes.   Heart: RRR w/o murmurs.  No edema.   Neuro: Alert, oriented, CN II-XII GI, sensory and motor innervation intact.  Musculoskeletal: No pain to bilateral MCPs, FROM noted to wrists and shoulders with no pain, medial epicondyle tenderness to right elbow, left with no pain, FROM noted bilaterally, + crepitus noted bilateral knees. No pain to bilateral MTPs. Pes planus noted to bilateral feet with R>L.    Psych: Alert, oriented, normal eye contact.    Labs:   11/22/2022: Hep B and C negative. HIV negative. Uric acid normal. ANCA negative. HLAB 27 positive. RPR and syphilis ab positive.  Chlamydia and gonorrhea PCR negative.  BASIL negative.  Rheumatoid factor and anti CCP negative.  EBV IgM negative, IgG positive.  Parvovirus B19 not detected.  EBV DNA not detected. Blood culture negative.     12/03/2022:  WBC count 17.7.  Hemoglobin 13.4.  " Platelet count elevated 447.  ESR elevated 128, normal less than 15.  Calcium slightly elevated 10.8.  Alk phos 172.  CMP okay.  ALT upper limit of normal.  CRP elevated 131.    23:  CBC, CMP okay.  ESR, CRP normal. Quantiferon tb negative.     3/27/23: CBC ok. ESR 21, . CMP ok.     23:  1+ protein and no blood in urine.   milligram/gram.  Urine chlamydia gonorrhea PCR negative.    2023 CMP Creat 1.22 , Calcium 10.6, otherwise ok. CRP 97.50 (previous 203.70, normal <5). CBC hgb 13.4. platelet 444 (previous 275), otherwise ok. Sed Rate 24 (<15). Uric Acid 7 (previous 4.9).     2023 CBC ok. CMP ok. Sed Rate 4 (<15), CRP 2.90 (<5). Uric Acid 4.5     2023: CMP okay.  Lupus anticoagulant not detected. Cardiolipin/phospholipid negative.  Beta 2 glycoprotein negative.  CBC okay.  Uric acid normal 4.7.     2023 CBC WBC 12.89, ANC 9.69 (strep), otherwise ok. CMP creat 1.20    2023 CRP 6.80 (<5), Hep B/C/HIV negative, Uric Acid 6.1, Sed Rate 10 (<15), CRP 6.80 (<5), Quantiferon negative    3/26/2024 CMP ok. CBC ok.     2024 CMP ok. CBC ok. CRP 2 (<5), Uric Acid 6.3 ok. Sed Rate 3 (<15). Chlamydia and GC negative.     2024 CMP okay, HIV negative, vitamin-D 43 okay, TSH 0.561 okay, CRP 1.9 (<5), ESR 4 (< 15), CBC okay    24:  CBC, CMP okay.    Imagin22:  X-ray of left wrist showed possible small avulsion injury at the distal tip of the ulnar styloid process, no erosions.  X-ray of left ankle showed soft tissue swelling on lateral side.  Normal x-ray of right ankle.  Normal x-ray of right knee.    23:  Normal chest x-ray.  X-ray of right hand showed mild DJD.  X-ray of left hand showed DJD.     2022:  Echocardiogram showed EF 64%, normal ejection fraction.  Normal right ventricular function.  Normal left ventricular systolic and diastolic function.    2023: SI Joint Xray: Mild SI Joint sclerosis     23:  Mild DJD of left knee.  DJD  changes seen in right knee.    12/18/2023 Right Foot Xray: Impression: No acute abnormalities are seen  Left Foot Xray: There is spurring of the insertion of the Achilles tendon.  There are changes suspicious for an early primary hallux valgus. Impression: Changes consistent with primary hallux valgus.    6/11/2024 Right Elbow Xray: Impression: No acute osseous abnormality.    Assessment:       ICD-10-CM ICD-9-CM   1. HLA-B27 positive arthropathy  M12.80 716.80   2. Rheumatoid arthritis, seronegative, multiple sites  M06.09 714.0   3. Drug-induced immunodeficiency  D84.821 279.3    Z79.899 E947.9   4. High risk medication use  Z79.899 V58.69   5. Chronic pain of right knee  M25.561 719.46    G89.29 338.29   6. History of syphilis  Z86.19 V12.09   7. Chronic midline low back pain with bilateral sciatica  M54.41 724.2    M54.42 724.3    G89.29 338.29   8. Pes planus of both feet  M21.41 734    M21.42    9. Chronic gout of multiple sites, unspecified cause  M1A.09X0 274.02         Plan:     1. HLA-B27 positive arthropathy  Diagnosed in 2022 after presentation to ED on 11/22/2022 with c/o of pain and swelling in left wrist, left hand, bilateral ankles and right knee. Started with pain and swelling in left wrist and progress to bilateral ankles and right knee.  Denies fevers or recent travel. History of bloody diarrhea in second week of 11/2022 in which he presented to ER for eval.  Negative RF and anti CCP. BASIL negative. HLA B27 Positive. Work up negative for EBV, Parvovirus, Chlamydia and Gonorrhea and blood cultures negative. No rashes, ECHO normal with no vegetations. Hep panel, HIV negative. Started on MTX in November of 2022, Humira added in December of 2022- worked well until March of 2023 secondary failure. Limited ROM of left wrist, repeat X-ray Jan 2023 showed showed DJD. Started Cosentyx June 2023 and tolerating well, also continues MTX 8 tab po qweek with folic acid 1 mg po qd.  Today on exam, no active  synovitis noted.     -Continue  MTX 20 mg po qweek and folic acid 1 mg daily.   -Continue Cosentyx 150 mg subq every 4 weeks. If needed can increase dose of Cosentyx.  -Chest Xray ok 1/2023  -Infection w/u negative 12/2023, repeat today  -Lab today for continued monitoring       2. Rheumatoid arthritis, seronegative, multiple sites   -See above     3. High risk medication use/Drug Induced Immunodeficiency   -Advised to stay up-to-date on age appropriate vaccinations and malignancy screening with PCP.   -Persons with rheumatoid arthritis, lupus, psoriatic arthritis and other autoimmune diseases are at increased risk of cardiovascular disease including heart attack and stroke. We recommend that all patients with these conditions have annual health maintenance exams including lipid measurements, blood pressure measurements, and smoking cessation counseling when applicable at their primary care provider's office.   -Continued lab monitoring.   -Monitor ETOH with MTX to 1-2 drinks a week.   - Defers vaccines.     4. Chronic pain of right knee and secondary DJD   -Secondary to infection he had as a kid and secondary arthritis.   -9/18/23:  Mild DJD of left knee.  DJD changes seen in right knee.   -Currently stable at this time     5. History of Syphilis  -Treated with PCN in September 2022 at [facility] unit.      6. 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 Unsure procedure- on September 2022 with Dr. Orta- has continued to follow up  -Completed PT which he reports has been very helpful - stable     7. Pes Planus  -Bilateral feet with R>L- currently followed by Dr. Robb in [place] (podiatry)- recommended orthotics. May be contributing to some of his foot pain.   -12/18/2023 Right Foot Xray: Impression: No acute abnormalities are seen  Left Foot Xray: There is spurring of the insertion of the Achilles tendon.  There are changes suspicious for an early primary  hallux valgus. Impression: Changes consistent with primary hallux valgus  - Educated bilateral feet pain secondary to flatfoot, poor midfoot and ankle 's and not because of Gout- Enc to continued to follow up with Dr Robb with inserts      8. Gout  -Currently treated by his PCP   -Last Uric Acid 6.3 June 2024  -Continue Allopurinol 100 mg po qd with PCP as directed.     9. Left Eye Erythema  - Has apt with Dr. Olea this afternoon, will keep us posted on dx after exam          No follow-ups on file. In addition to their scheduled follow up, the patient has also been instructed to follow up on as needed basis.     Total time spent with patient and documentation is 30 minutes. All questions were answered to patient's satisfaction and patient verbalized understanding. This includes face to face time and non-face to face time preparing to see the patient (eg, review of tests), obtaining and/or reviewing separately obtained history, documenting clinical information in the electronic or other health record, independently interpreting results and communicating results to the patient/family/caregiver, or care coordinator.

## 2025-02-26 ENCOUNTER — LAB VISIT (OUTPATIENT)
Dept: LAB | Facility: HOSPITAL | Age: 45
End: 2025-02-26
Attending: NURSE PRACTITIONER
Payer: MEDICARE

## 2025-02-26 ENCOUNTER — OFFICE VISIT (OUTPATIENT)
Dept: RHEUMATOLOGY | Facility: CLINIC | Age: 45
End: 2025-02-26
Payer: MEDICARE

## 2025-02-26 VITALS
HEIGHT: 69 IN | SYSTOLIC BLOOD PRESSURE: 131 MMHG | BODY MASS INDEX: 30.78 KG/M2 | WEIGHT: 207.81 LBS | TEMPERATURE: 98 F | HEART RATE: 81 BPM | OXYGEN SATURATION: 98 % | DIASTOLIC BLOOD PRESSURE: 87 MMHG | RESPIRATION RATE: 20 BRPM

## 2025-02-26 DIAGNOSIS — M1A.09X0 CHRONIC GOUT OF MULTIPLE SITES, UNSPECIFIED CAUSE: ICD-10-CM

## 2025-02-26 DIAGNOSIS — M54.41 CHRONIC MIDLINE LOW BACK PAIN WITH BILATERAL SCIATICA: ICD-10-CM

## 2025-02-26 DIAGNOSIS — M21.42 PES PLANUS OF BOTH FEET: ICD-10-CM

## 2025-02-26 DIAGNOSIS — M06.09 RHEUMATOID ARTHRITIS, SERONEGATIVE, MULTIPLE SITES: ICD-10-CM

## 2025-02-26 DIAGNOSIS — G89.29 CHRONIC MIDLINE LOW BACK PAIN WITH BILATERAL SCIATICA: ICD-10-CM

## 2025-02-26 DIAGNOSIS — M21.41 PES PLANUS OF BOTH FEET: ICD-10-CM

## 2025-02-26 DIAGNOSIS — Z79.899 DRUG-INDUCED IMMUNODEFICIENCY: ICD-10-CM

## 2025-02-26 DIAGNOSIS — M12.80 HLA-B27 POSITIVE ARTHROPATHY: Primary | ICD-10-CM

## 2025-02-26 DIAGNOSIS — D84.821 DRUG-INDUCED IMMUNODEFICIENCY: ICD-10-CM

## 2025-02-26 DIAGNOSIS — G89.29 CHRONIC PAIN OF RIGHT KNEE: ICD-10-CM

## 2025-02-26 DIAGNOSIS — Z79.899 HIGH RISK MEDICATION USE: ICD-10-CM

## 2025-02-26 DIAGNOSIS — Z86.19 HISTORY OF SYPHILIS: ICD-10-CM

## 2025-02-26 DIAGNOSIS — M12.80 HLA-B27 POSITIVE ARTHROPATHY: ICD-10-CM

## 2025-02-26 DIAGNOSIS — M54.42 CHRONIC MIDLINE LOW BACK PAIN WITH BILATERAL SCIATICA: ICD-10-CM

## 2025-02-26 DIAGNOSIS — M25.561 CHRONIC PAIN OF RIGHT KNEE: ICD-10-CM

## 2025-02-26 LAB
ALBUMIN SERPL-MCNC: 4.2 G/DL (ref 3.5–5)
ALBUMIN/GLOB SERPL: 1.2 RATIO (ref 1.1–2)
ALP SERPL-CCNC: 96 UNIT/L (ref 40–150)
ALT SERPL-CCNC: 43 UNIT/L (ref 0–55)
ANION GAP SERPL CALC-SCNC: 5 MEQ/L
AST SERPL-CCNC: 22 UNIT/L (ref 5–34)
BASOPHILS # BLD AUTO: 0.03 X10(3)/MCL
BASOPHILS NFR BLD AUTO: 0.4 %
BILIRUB SERPL-MCNC: 0.4 MG/DL
BUN SERPL-MCNC: 24.3 MG/DL (ref 8.9–20.6)
CALCIUM SERPL-MCNC: 9.3 MG/DL (ref 8.4–10.2)
CHLORIDE SERPL-SCNC: 109 MMOL/L (ref 98–107)
CO2 SERPL-SCNC: 25 MMOL/L (ref 22–29)
CREAT SERPL-MCNC: 0.94 MG/DL (ref 0.72–1.25)
CREAT/UREA NIT SERPL: 26
CRP SERPL-MCNC: 7.2 MG/L
EOSINOPHIL # BLD AUTO: 0.09 X10(3)/MCL (ref 0–0.9)
EOSINOPHIL NFR BLD AUTO: 1.1 %
ERYTHROCYTE [DISTWIDTH] IN BLOOD BY AUTOMATED COUNT: 13.8 % (ref 11.5–17)
ERYTHROCYTE [SEDIMENTATION RATE] IN BLOOD: 5 MM/HR (ref 0–15)
GFR SERPLBLD CREATININE-BSD FMLA CKD-EPI: >60 ML/MIN/1.73/M2
GLOBULIN SER-MCNC: 3.4 GM/DL (ref 2.4–3.5)
GLUCOSE SERPL-MCNC: 104 MG/DL (ref 74–100)
HBV CORE AB SERPL QL IA: NONREACTIVE
HBV SURFACE AB SER-ACNC: 0.75 MIU/ML
HBV SURFACE AB SERPL IA-ACNC: NONREACTIVE M[IU]/ML
HBV SURFACE AG SERPL QL IA: NONREACTIVE
HCT VFR BLD AUTO: 45.4 % (ref 42–52)
HCV AB SERPL QL IA: NONREACTIVE
HGB BLD-MCNC: 15.4 G/DL (ref 14–18)
HIV 1+2 AB+HIV1 P24 AG SERPL QL IA: NONREACTIVE
IMM GRANULOCYTES # BLD AUTO: 0.02 X10(3)/MCL (ref 0–0.04)
IMM GRANULOCYTES NFR BLD AUTO: 0.2 %
LYMPHOCYTES # BLD AUTO: 1.74 X10(3)/MCL (ref 0.6–4.6)
LYMPHOCYTES NFR BLD AUTO: 21.6 %
MCH RBC QN AUTO: 29.5 PG (ref 27–31)
MCHC RBC AUTO-ENTMCNC: 33.9 G/DL (ref 33–36)
MCV RBC AUTO: 87 FL (ref 80–94)
MONOCYTES # BLD AUTO: 0.72 X10(3)/MCL (ref 0.1–1.3)
MONOCYTES NFR BLD AUTO: 9 %
NEUTROPHILS # BLD AUTO: 5.44 X10(3)/MCL (ref 2.1–9.2)
NEUTROPHILS NFR BLD AUTO: 67.7 %
NRBC BLD AUTO-RTO: 0 %
PLATELET # BLD AUTO: 175 X10(3)/MCL (ref 130–400)
PMV BLD AUTO: 10.2 FL (ref 7.4–10.4)
POTASSIUM SERPL-SCNC: 4.1 MMOL/L (ref 3.5–5.1)
PROT SERPL-MCNC: 7.6 GM/DL (ref 6.4–8.3)
RBC # BLD AUTO: 5.22 X10(6)/MCL (ref 4.7–6.1)
SODIUM SERPL-SCNC: 139 MMOL/L (ref 136–145)
WBC # BLD AUTO: 8.04 X10(3)/MCL (ref 4.5–11.5)

## 2025-02-26 PROCEDURE — 86706 HEP B SURFACE ANTIBODY: CPT

## 2025-02-26 PROCEDURE — 1160F RVW MEDS BY RX/DR IN RCRD: CPT | Mod: CPTII,,, | Performed by: NURSE PRACTITIONER

## 2025-02-26 PROCEDURE — 80053 COMPREHEN METABOLIC PANEL: CPT

## 2025-02-26 PROCEDURE — 36415 COLL VENOUS BLD VENIPUNCTURE: CPT

## 2025-02-26 PROCEDURE — 86140 C-REACTIVE PROTEIN: CPT

## 2025-02-26 PROCEDURE — 87340 HEPATITIS B SURFACE AG IA: CPT

## 2025-02-26 PROCEDURE — 3075F SYST BP GE 130 - 139MM HG: CPT | Mod: CPTII,,, | Performed by: NURSE PRACTITIONER

## 2025-02-26 PROCEDURE — 86480 TB TEST CELL IMMUN MEASURE: CPT

## 2025-02-26 PROCEDURE — 3008F BODY MASS INDEX DOCD: CPT | Mod: CPTII,,, | Performed by: NURSE PRACTITIONER

## 2025-02-26 PROCEDURE — 99214 OFFICE O/P EST MOD 30 MIN: CPT | Mod: S$PBB,,, | Performed by: NURSE PRACTITIONER

## 2025-02-26 PROCEDURE — 86704 HEP B CORE ANTIBODY TOTAL: CPT

## 2025-02-26 PROCEDURE — 99214 OFFICE O/P EST MOD 30 MIN: CPT | Mod: PBBFAC | Performed by: NURSE PRACTITIONER

## 2025-02-26 PROCEDURE — 85025 COMPLETE CBC W/AUTO DIFF WBC: CPT

## 2025-02-26 PROCEDURE — 86803 HEPATITIS C AB TEST: CPT

## 2025-02-26 PROCEDURE — 1159F MED LIST DOCD IN RCRD: CPT | Mod: CPTII,,, | Performed by: NURSE PRACTITIONER

## 2025-02-26 PROCEDURE — 3079F DIAST BP 80-89 MM HG: CPT | Mod: CPTII,,, | Performed by: NURSE PRACTITIONER

## 2025-02-26 PROCEDURE — 85652 RBC SED RATE AUTOMATED: CPT

## 2025-02-26 PROCEDURE — 87389 HIV-1 AG W/HIV-1&-2 AB AG IA: CPT

## 2025-02-26 RX ORDER — SECUKINUMAB 150 MG/ML
INJECTION SUBCUTANEOUS
Qty: 1 ML | Refills: 5 | Status: SHIPPED | OUTPATIENT
Start: 2025-02-26

## 2025-02-26 RX ORDER — FOLIC ACID 1 MG/1
1 TABLET ORAL DAILY
Qty: 30 TABLET | Refills: 6 | Status: SHIPPED | OUTPATIENT
Start: 2025-02-26 | End: 2025-05-27

## 2025-02-26 RX ORDER — METHOTREXATE 2.5 MG/1
TABLET ORAL
Qty: 40 TABLET | Refills: 2 | Status: SHIPPED | OUTPATIENT
Start: 2025-02-26

## 2025-02-28 LAB
GAMMA INTERFERON BACKGROUND BLD IA-ACNC: 0.13 IU/ML
M TB IFN-G BLD-IMP: NEGATIVE
M TB IFN-G CD4+ BCKGRND COR BLD-ACNC: 0.04 IU/ML
M TB IFN-G CD4+CD8+ BCKGRND COR BLD-ACNC: 0.01 IU/ML
MITOGEN IGNF BCKGRD COR BLD-ACNC: 7.46 IU/ML

## 2025-03-05 ENCOUNTER — TELEPHONE (OUTPATIENT)
Dept: RHEUMATOLOGY | Facility: CLINIC | Age: 45
End: 2025-03-05
Payer: MEDICARE

## 2025-03-05 ENCOUNTER — RESULTS FOLLOW-UP (OUTPATIENT)
Dept: RHEUMATOLOGY | Facility: CLINIC | Age: 45
End: 2025-03-05

## 2025-03-05 NOTE — TELEPHONE ENCOUNTER
Requested last OV from [facility] for Provider Angeles. Success will be scanned to media mgr for reference. A reminder has been placed to fu in 1 week.

## 2025-04-27 ENCOUNTER — PATIENT MESSAGE (OUTPATIENT)
Dept: RHEUMATOLOGY | Facility: CLINIC | Age: 45
End: 2025-04-27
Payer: MEDICARE

## 2025-04-28 ENCOUNTER — PATIENT MESSAGE (OUTPATIENT)
Dept: RHEUMATOLOGY | Facility: CLINIC | Age: 45
End: 2025-04-28
Payer: MEDICARE

## 2025-04-28 DIAGNOSIS — Z79.899 DRUG-INDUCED IMMUNODEFICIENCY: ICD-10-CM

## 2025-04-28 DIAGNOSIS — D84.821 DRUG-INDUCED IMMUNODEFICIENCY: ICD-10-CM

## 2025-04-28 DIAGNOSIS — M12.80 HLA-B27 POSITIVE ARTHROPATHY: Primary | ICD-10-CM

## 2025-04-28 DIAGNOSIS — M06.09 RHEUMATOID ARTHRITIS, SERONEGATIVE, MULTIPLE SITES: ICD-10-CM

## 2025-04-28 DIAGNOSIS — Z79.899 HIGH RISK MEDICATION USE: ICD-10-CM

## 2025-04-28 DIAGNOSIS — M12.80 HLA-B27 POSITIVE ARTHROPATHY: ICD-10-CM

## 2025-04-28 RX ORDER — SECUKINUMAB 150 MG/ML
INJECTION SUBCUTANEOUS
Qty: 2 ML | Refills: 5 | Status: SHIPPED | OUTPATIENT
Start: 2025-04-28

## 2025-04-28 RX ORDER — METHYLPREDNISOLONE 4 MG/1
TABLET ORAL
Qty: 21 EACH | Refills: 0 | Status: SHIPPED | OUTPATIENT
Start: 2025-04-28

## 2025-04-28 NOTE — TELEPHONE ENCOUNTER
Patient Discussion Note: The patient presents reporting new symptoms following a Cosentyx 150 mg injection on 4/23/2025. Symptoms include:  Foot inflammation with swollen, red toes (see attached pictures).  Swollen, red, bloodshot eyes with bleeding.  Knee joint inflammation, resulting in difficulty walking without a cane.  The patient continues to take MTX as prescribed. They visited Dr. Jorgensen for examination and were prescribed eye drops for ocular inflammation.    Assessment: The reported symptoms are likely indicative of a disease flare-up inadequately controlled by the current Cosentyx 150 mg dose. Given the patients long-term use of Cosentyx without prior adverse reactions, the correlation between the medication and these symptoms is considered low.    Plan:  Medication Adjustment:  Increase Cosentyx to 300 mg per the dosing schedule. Patient instructed to administer the next injection today or tomorrow (4/28/2025 or 4/29/2025) to transition to the 300 mg dose.  Prescribe a Medrol Dose Pack to manage acute inflammation.  Follow-Up:  Request Dr. Asencio consultation notes for review.  Schedule follow-up appointment for 6/12/2025   Patient Instructions:  Monitor symptoms and notify the clinic if symptoms do not improve or worsen.  Send updated pictures of affected areas to the clinic for ongoing assessment.

## 2025-04-29 ENCOUNTER — TELEPHONE (OUTPATIENT)
Dept: RHEUMATOLOGY | Facility: CLINIC | Age: 45
End: 2025-04-29
Payer: MEDICARE

## 2025-04-29 NOTE — TELEPHONE ENCOUNTER
04/28/25: Faxed MR Request to Dr. Olea - Ophthalmology    04/29/25: Called Dr. Olea's Office to confirm MR Request received. Spoke w/ Bea. Records not received. Instructed me to send to different fax [phone]. Success will be scanned in Media Will follow up by end of week.

## 2025-05-05 ENCOUNTER — PATIENT MESSAGE (OUTPATIENT)
Dept: RHEUMATOLOGY | Facility: CLINIC | Age: 45
End: 2025-05-05
Payer: MEDICARE

## 2025-05-06 ENCOUNTER — PATIENT MESSAGE (OUTPATIENT)
Dept: RHEUMATOLOGY | Facility: CLINIC | Age: 45
End: 2025-05-06
Payer: MEDICARE

## 2025-05-07 ENCOUNTER — PATIENT MESSAGE (OUTPATIENT)
Dept: RHEUMATOLOGY | Facility: CLINIC | Age: 45
End: 2025-05-07
Payer: MEDICARE

## 2025-05-07 NOTE — TELEPHONE ENCOUNTER
Placed a tc and spoke with patient whom reported that he finished steroids. Reports the he will be seeing his PCP tomorrow and will talk to his PCP and to call our clinic to possibly schedule an appointment after speaking with his PCP. Patient declined scheduling an appt with Rheumatology at time of this tc. Patient confirmed he did increase his Cosentyx but still needs to get his labs done. May or may not get them done tomorrow. He will get done when he has time he stated.       All above will be forwarded to Provider.

## 2025-05-14 ENCOUNTER — PATIENT MESSAGE (OUTPATIENT)
Dept: RHEUMATOLOGY | Facility: CLINIC | Age: 45
End: 2025-05-14
Payer: MEDICARE

## 2025-05-16 ENCOUNTER — OFFICE VISIT (OUTPATIENT)
Dept: RHEUMATOLOGY | Facility: CLINIC | Age: 45
End: 2025-05-16
Payer: MEDICARE

## 2025-05-16 ENCOUNTER — RESULTS FOLLOW-UP (OUTPATIENT)
Dept: RHEUMATOLOGY | Facility: CLINIC | Age: 45
End: 2025-05-16

## 2025-05-16 ENCOUNTER — LAB VISIT (OUTPATIENT)
Dept: LAB | Facility: HOSPITAL | Age: 45
End: 2025-05-16
Attending: NURSE PRACTITIONER
Payer: MEDICARE

## 2025-05-16 VITALS
SYSTOLIC BLOOD PRESSURE: 118 MMHG | RESPIRATION RATE: 20 BRPM | WEIGHT: 195 LBS | OXYGEN SATURATION: 100 % | DIASTOLIC BLOOD PRESSURE: 84 MMHG | TEMPERATURE: 98 F | BODY MASS INDEX: 28.88 KG/M2 | HEART RATE: 96 BPM | HEIGHT: 69 IN

## 2025-05-16 DIAGNOSIS — M54.42 CHRONIC MIDLINE LOW BACK PAIN WITH BILATERAL SCIATICA: ICD-10-CM

## 2025-05-16 DIAGNOSIS — M1A.09X0 CHRONIC GOUT OF MULTIPLE SITES, UNSPECIFIED CAUSE: ICD-10-CM

## 2025-05-16 DIAGNOSIS — Z79.899 HIGH RISK MEDICATION USE: ICD-10-CM

## 2025-05-16 DIAGNOSIS — D84.821 DRUG-INDUCED IMMUNODEFICIENCY: ICD-10-CM

## 2025-05-16 DIAGNOSIS — G89.29 CHRONIC PAIN OF RIGHT KNEE: ICD-10-CM

## 2025-05-16 DIAGNOSIS — M21.41 PES PLANUS OF BOTH FEET: ICD-10-CM

## 2025-05-16 DIAGNOSIS — K21.9 GASTROESOPHAGEAL REFLUX DISEASE, UNSPECIFIED WHETHER ESOPHAGITIS PRESENT: ICD-10-CM

## 2025-05-16 DIAGNOSIS — K92.1 HEMATOCHEZIA: ICD-10-CM

## 2025-05-16 DIAGNOSIS — H15.102 EPISCLERITIS OF LEFT EYE: ICD-10-CM

## 2025-05-16 DIAGNOSIS — Z86.19 HISTORY OF SYPHILIS: ICD-10-CM

## 2025-05-16 DIAGNOSIS — M21.42 PES PLANUS OF BOTH FEET: ICD-10-CM

## 2025-05-16 DIAGNOSIS — M06.09 RHEUMATOID ARTHRITIS, SERONEGATIVE, MULTIPLE SITES: ICD-10-CM

## 2025-05-16 DIAGNOSIS — M12.80 HLA-B27 POSITIVE ARTHROPATHY: ICD-10-CM

## 2025-05-16 DIAGNOSIS — Z79.899 DRUG-INDUCED IMMUNODEFICIENCY: ICD-10-CM

## 2025-05-16 DIAGNOSIS — M54.41 CHRONIC MIDLINE LOW BACK PAIN WITH BILATERAL SCIATICA: ICD-10-CM

## 2025-05-16 DIAGNOSIS — M25.561 CHRONIC PAIN OF RIGHT KNEE: ICD-10-CM

## 2025-05-16 DIAGNOSIS — G89.29 CHRONIC MIDLINE LOW BACK PAIN WITH BILATERAL SCIATICA: ICD-10-CM

## 2025-05-16 DIAGNOSIS — M12.80 HLA-B27 POSITIVE ARTHROPATHY: Primary | ICD-10-CM

## 2025-05-16 LAB
ALBUMIN SERPL-MCNC: 3.7 G/DL (ref 3.5–5)
ALBUMIN/GLOB SERPL: 0.9 RATIO (ref 1.1–2)
ALP SERPL-CCNC: 91 UNIT/L (ref 40–150)
ALT SERPL-CCNC: 24 UNIT/L (ref 0–55)
ANION GAP SERPL CALC-SCNC: 10 MEQ/L
AST SERPL-CCNC: 18 UNIT/L (ref 11–45)
BASOPHILS # BLD AUTO: 0.03 X10(3)/MCL
BASOPHILS NFR BLD AUTO: 0.3 %
BILIRUB SERPL-MCNC: 0.5 MG/DL
BUN SERPL-MCNC: 17.9 MG/DL (ref 8.9–20.6)
CALCIUM SERPL-MCNC: 9.6 MG/DL (ref 8.4–10.2)
CHLORIDE SERPL-SCNC: 107 MMOL/L (ref 98–107)
CO2 SERPL-SCNC: 24 MMOL/L (ref 22–29)
CREAT SERPL-MCNC: 1.22 MG/DL (ref 0.72–1.25)
CREAT/UREA NIT SERPL: 15
CRP SERPL-MCNC: 51.8 MG/L
EOSINOPHIL # BLD AUTO: 0.09 X10(3)/MCL (ref 0–0.9)
EOSINOPHIL NFR BLD AUTO: 0.9 %
ERYTHROCYTE [DISTWIDTH] IN BLOOD BY AUTOMATED COUNT: 13.4 % (ref 11.5–17)
ERYTHROCYTE [SEDIMENTATION RATE] IN BLOOD: 15 MM/HR (ref 0–15)
GFR SERPLBLD CREATININE-BSD FMLA CKD-EPI: >60 ML/MIN/1.73/M2
GLOBULIN SER-MCNC: 4.2 GM/DL (ref 2.4–3.5)
GLUCOSE SERPL-MCNC: 94 MG/DL (ref 74–100)
HCT VFR BLD AUTO: 44.2 % (ref 42–52)
HGB BLD-MCNC: 15.2 G/DL (ref 14–18)
IMM GRANULOCYTES # BLD AUTO: 0.03 X10(3)/MCL (ref 0–0.04)
IMM GRANULOCYTES NFR BLD AUTO: 0.3 %
LYMPHOCYTES # BLD AUTO: 2.21 X10(3)/MCL (ref 0.6–4.6)
LYMPHOCYTES NFR BLD AUTO: 22.7 %
MCH RBC QN AUTO: 30 PG (ref 27–31)
MCHC RBC AUTO-ENTMCNC: 34.4 G/DL (ref 33–36)
MCV RBC AUTO: 87.2 FL (ref 80–94)
MONOCYTES # BLD AUTO: 0.95 X10(3)/MCL (ref 0.1–1.3)
MONOCYTES NFR BLD AUTO: 9.8 %
NEUTROPHILS # BLD AUTO: 6.43 X10(3)/MCL (ref 2.1–9.2)
NEUTROPHILS NFR BLD AUTO: 66 %
NRBC BLD AUTO-RTO: 0 %
PLATELET # BLD AUTO: 260 X10(3)/MCL (ref 130–400)
PMV BLD AUTO: 9 FL (ref 7.4–10.4)
POTASSIUM SERPL-SCNC: 4.4 MMOL/L (ref 3.5–5.1)
PROT SERPL-MCNC: 7.9 GM/DL (ref 6.4–8.3)
RBC # BLD AUTO: 5.07 X10(6)/MCL (ref 4.7–6.1)
SODIUM SERPL-SCNC: 141 MMOL/L (ref 136–145)
URATE SERPL-MCNC: 6.2 MG/DL (ref 3.5–7.2)
WBC # BLD AUTO: 9.74 X10(3)/MCL (ref 4.5–11.5)

## 2025-05-16 PROCEDURE — 80053 COMPREHEN METABOLIC PANEL: CPT

## 2025-05-16 PROCEDURE — 99215 OFFICE O/P EST HI 40 MIN: CPT | Mod: PBBFAC | Performed by: NURSE PRACTITIONER

## 2025-05-16 PROCEDURE — 86140 C-REACTIVE PROTEIN: CPT

## 2025-05-16 PROCEDURE — 85652 RBC SED RATE AUTOMATED: CPT

## 2025-05-16 PROCEDURE — 85025 COMPLETE CBC W/AUTO DIFF WBC: CPT

## 2025-05-16 PROCEDURE — 36415 COLL VENOUS BLD VENIPUNCTURE: CPT

## 2025-05-16 PROCEDURE — 84550 ASSAY OF BLOOD/URIC ACID: CPT

## 2025-05-16 RX ORDER — FAMOTIDINE 20 MG/1
20 TABLET, FILM COATED ORAL 2 TIMES DAILY
COMMUNITY
Start: 2025-05-04

## 2025-05-16 RX ORDER — LOTEPREDNOL ETABONATE 5 MG/ML
1 SUSPENSION/ DROPS OPHTHALMIC 3 TIMES DAILY
COMMUNITY
Start: 2025-02-28

## 2025-05-16 RX ORDER — METHYLPREDNISOLONE ACETATE 40 MG/ML
40 INJECTION, SUSPENSION INTRA-ARTICULAR; INTRALESIONAL; INTRAMUSCULAR; SOFT TISSUE
Status: COMPLETED | OUTPATIENT
Start: 2025-05-16 | End: 2025-05-16

## 2025-05-16 RX ADMIN — METHYLPREDNISOLONE ACETATE 40 MG: 40 INJECTION, SUSPENSION INTRA-ARTICULAR; INTRALESIONAL; INTRAMUSCULAR; SOFT TISSUE at 11:05

## 2025-05-16 NOTE — PROGRESS NOTES
"    Patient ID: [id number]     Chief Complaint: Follow-up (To discuss Cosentyx  /Took (300mg) 04.23.25  28th day dose is 05.21.25 Reports it did npot help any stated "it just made things worse" I talked to a lot of people and was told why do they want to up the dose to 300 if the 150 did what it did Even my PCP nurse looked at me crazy when I told her. Mother present. Reports he will suggest a med his cousin takes and med name is-Enbrel)    HPI:     Trey Harper is a 45 y.o. male here today for follow up of spondyloarthritis, +HLA-B27.      Presents today for a follow up of +HLA-B27 Spondyloarthritis/reactive arthritis diagnosed in 2022 after presentation to ED on 11/22/2022 with c/o of pain and swelling in left wrist, left hand, bilateral ankles and right knee.  History of bloody diarrhea few weeks prior to that presentation.  He was treated with Ciprofloxacin and Flagyl at the time. History of septic arthritis in right knee when he was 5 to 6 years old. He previously tested positive for Syphilis and was treated with Penicillin in 9/2022.  He was initially started on Humira but developed secondary failure.      Here today for follow up. Currently taking Methotrexate 20 mg per week with folic acid 1 mg daily and Cosentyx 150 mg subQ every 4 weeks. He denies any red/warm/swollen joints, morning stiffness lasting roughly 15-20 minutes. Tolerating medications well.   C/o pain in bilateral feet, mid foot, when he walks, better with stretching for last 1-2 months. PCP told him that its gout. Educated its not gout pain in feet secondary to flat fee and recommend seeing podiatry.   He has seen Podiatry in the past and was told he needed braces for his ankles, followed by Dr. Garvin (podiatrist) in [place]. He was also told he has severe flat feet bilaterally and needs custom fit orthotics, he can not afford that.    He was also started on Allopurinol 100 mg po qd by his PCP.   Continues to have intermittent back " pain, worse when walking and standing on feet. Reports back pain at night, has a hard time falling asleep due to pain and getting comfortable, when he does fall asleep he states it is very short lived due to pain waking him up.     He has continued to have issues with his feet, mainly with walking, recently visited the [email or web address] [facility] and had some inserts made so hoping this helps. Has also seen Podiatry in the past and was told he needed braces for his ankles, followed by Dr. Garvin (podiatrist) in [place]- he has tried to wear but has been uncomfortable so admits not wearing as directed.     June 2024: His feet continue to bother him at times, he was seeing [facility] in the past but has not seen him in a while, last discussed Orthotics and does have but has issues with blisters when uses. Back pain is not bothering him as much now, will have some aches and pains here and there but for the most part has been much better with meds. He has also been having right elbow pain for the past 2 months, comes and goes, denies injury. Reports when he bends his elbow he can feel pain, feels pain is deep to medial aspect, he is right hand dominant. He has not taken anything for pain.    September 2024: Here today for follow-up visit. Currently taking Methotrexate 20 mg per week with folic acid 1 mg daily and Cosentyx 150 mg subQ every 4 weeks. He denies any red/warm/swollen joints, morning stiffness lasting roughly 15-20 minutes. He reports he will have some swelling in his feet at times if he is on them for long period- he will ice his feet at night time and this does help. He does report an increase in fatigue over the past few months- states he has been sleeping well but states when he wakes up he is not feeling rested- he did just have an apt with his PCP 2 days ago and is awaiting lab results- however did not mention to her the fatigue at that time.       February 2025: Here today for follow up.  Currently taking  Methotrexate 20 mg per week with folic acid 1 mg daily and Cosentyx 150 mg subQ every 4 weeks. He denies any red/warm/swollen joints, morning stiffness lasting roughly 15-20 minutes. He was having some pain in his feet- seeing Podiatry- was suppose to be wearing braces but states causes blisters- following as needed- did get rx for custom insoles but needs to have made. He woke up yesterday with left eye redness with crusting and discharge, states very sensitive to light- has an apt today with Dr. Olea for evaluation, does not think foreign object. Denies missing any doses of meds, taking as directed.     May 2025: Here today for follow up. He is here with his mother Ms. Avila for follow-up and evaluation of continued pain.  He called 04/28/2025 with an increase in joint pain/swelling and pain and redness of his left eye. He reports taking MTX  20 mg po qweek with folic acid 1 mg po qd and Cosentyx 150 mg subq qmonth without missing any doses. At this time prednisone x 5 days was sent over and Cosentyx dose increase to 300 mg subQ-he just took his 150 mg dose-advised to take a 2nd 150 mg injection in which he did.  He reports pain continued, he was also having inflammation of his left eye in which he was following with his Ophthalmologist, he was diagnosed with episcleritis  and treated with prednisone drops-has resolved (records requested and reviewed).  He is still having issues with some blurriness but he will be taking up his new prescription eyeglasses within the week-he was advised to continue to follow-up with ophthalmology for any issues or concerns that continue- redness and pain have resolved.  He reports he has continued to have pain and is very tearful and frustrated as it has interfered with his ADLs, was given steroids by his PCP earlier this month in which he reports did help but he does continue to have some pain and discomfort, especially pain and swelling to his left foot 4th toe.  He states he  was having severe pain to all joints, he denies having any swelling at this time with the exception of his toe, he also reports having pain to the posterior aspect of bilateral ankles.  He denies any issues with rashes.  He does report having a GI bug the week prior to symptoms starting (vomiting and then diarrhea), at this time he did have some bloody stools with diarrhea-reports resolved once diarrhea subsided and has not occurred again.  He was scheduled to have a colonoscopy done but reports he was concerned with prep and getting to the restroom due to pain so has postponed at this time he is currently not following with GI but was referred by his PCP.      Denies any recent illness or hospitalizations since last visit.     Dr. Robb in [place]- Podiatry  Dr. Orta- Neurospinal  Dr. Olea- Ophthalmology     [facility]: GERD and chronic right knee pain.    Denies fevers, rashes, oral and nasal ulcers, history of DVT or PE, history of stroke or seizures, history of MI, history of malignancies, Raynaud's phenomenon,  history of inflammatory bowel disease.  Family history of autoimmune disease: Pat Aunt with RA.   Smoking: none  Father with Esophageal Ca     Social History     Tobacco Use   Smoking Status Never   Smokeless Tobacco Never          -------------------------------------    Arthritis    Gout, unspecified    Other specified noninfective gastroenteritis and colitis    Syphilis, unspecified        Past Surgical History:   Procedure Laterality Date    SPINE SURGERY  09/19/2022       Review of patient's allergies indicates:  No Known Allergies    Outpatient Medications Marked as Taking for the 5/16/25 encounter (Office Visit) with Angeles Johns FNP   Medication Sig Dispense Refill    ALCOHOL PREP PADS PadM SMARTSIG:Pledget(s) Topical      allopurinoL (ZYLOPRIM) 100 MG tablet Take 100 mg by mouth Daily.      COSENTYX  mg/mL PnIj Inject 300 mg subq qmonth. Hold for fever or infections. 2 mL  5    famotidine (PEPCID) 20 MG tablet Take 20 mg by mouth 2 (two) times daily.      folic acid (FOLVITE) 1 MG tablet Take 1 tablet (1 mg total) by mouth once daily. 30 tablet 6    loteprednol (LOTEMAX) 0.5 % ophthalmic suspension Place 1 drop into the left eye 3 (three) times daily.      meloxicam (MOBIC) 7.5 MG tablet TAKE ONE TABLET BY MOUTH ONCE A DAY for 90 days      methotrexate 2.5 MG Tab Take 4 tablets on Friday morning and 4 tablets on Friday night, once a week. Hold for infection or fever. 40 tablet 2    multivit with min-folic acid 0.4 mg Tab 1 tablet Orally Once a day      propylene glycol/peg 400/PF (SYSTANE, PF, OPHT) Apply to eye.      PROTONIX 40 mg tablet Take 40 mg by mouth once daily.      SHARPS CONTAINER use as directed       Current Facility-Administered Medications for the 5/16/25 encounter (Office Visit) with Angeles Johns FNP   Medication Dose Route Frequency Provider Last Rate Last Admin    [COMPLETED] methylPREDNISolone acetate injection 40 mg  40 mg Intramuscular 1 time in Clinic/HOD    40 mg at 05/16/25 1141    [COMPLETED] methylPREDNISolone acetate injection 40 mg  40 mg Intramuscular 1 time in Clinic/HOD    40 mg at 05/16/25 1140       Social History     Socioeconomic History    Marital status: Single   Tobacco Use    Smoking status: Never    Smokeless tobacco: Never   Substance and Sexual Activity    Alcohol use: Yes     Comment: occ    Drug use: Never    Sexual activity: Not Currently     Partners: Male     Birth control/protection: None        Family History   Problem Relation Name Age of Onset    Cancer Father Esophagus cancer     Rheum arthritis Paternal Aunt      Rheum arthritis Paternal Grandmother          Immunization History   Administered Date(s) Administered    DT (Pediatric) 12/17/1984, 09/30/1994    DTP 1980, 1980, 1980, 10/22/1981    MMR 06/25/1981    OPV 1980, 1980, 1980, 10/22/1981       Patient Care Team:  [facility]  "Kiko Hansen MD as PCP - General (Family Medicine)     Subjective:     ROS    Constitutional:  Denies chills. Denies fever. Denies night sweats. Denies weight loss.   Ophthalmology: Admits blurred vision. Denies dry eyes. Admits eye pain due to sensitivity to light. Denies Itching. Admits redness.   ENT: Denies oral ulcers. Denies epistaxis. Denies dry mouth. Denies swollen glands.   Endocrine: Denies diabetes. Denies thyroid Problems.   Respiratory: Denies cough. Denies shortness of breath. Denies shortness of breath with exertion. Denies hemoptysis.   Cardiovascular: Denies chest pain at rest. Denies chest pain with exertion. Denies palpitations.    Gastrointestinal: Denies abdominal pain. Denies diarrhea. Denies nausea. Denies vomiting. Denies hematemesis or hematochezia. Denies heartburn.  Genitourinary: Denies blood in urine.  Musculoskeletal: See HPI for details  Integumentary: Denies rash. Denies photosensitivity.   Peripheral Vascular: Denies Ulcers of hands and/or feet. Denies Cold extremities.   Neurologic: Denies dizziness. Denies headache.  Denies loss of strength. Denies numbness or tingling.   Psychiatric: Denies depression and anxiety- doing much better since he is feeling better. Denies suicidal/homicidal ideations.      Objective:     /84 (BP Location: Left arm, Patient Position: Sitting)   Pulse 96   Temp 98.2 °F (36.8 °C) (Oral)   Resp 20   Ht 5' 9" (1.753 m)   Wt 88.5 kg (195 lb)   SpO2 100%   BMI 28.80 kg/m²     Physical Exam    General Appearance: alert, pleasant, in no acute distress.  Skin: Skin color, texture, turgor normal. No rashes or lesions.  Eyes:  extraocular movement intact (EOMI), pupils equal, round, reactive to light and accommodation, conjunctiva clear on right, injected and erythematous on left, no discharge noted. Today bilateral conjunctiva clear, PERRLA  ENT: No oral or nasal ulcers.  Neck:  Neck supple. No adenopathy.   Lungs: CTA throughout without crackles, " rhonchi, or wheezes.   Heart: RRR w/o murmurs.  No edema.   Neuro: Alert, oriented, CN II-XII GI, sensory and motor innervation intact.  Musculoskeletal: No pain to bilateral MCPs/PIPs/DIPs, FROM noted to wrists and shoulders with no pain, medial epicondyle tenderness to bilateral elbows, no swelling noted, FROM noted bilaterally, + crepitus noted bilateral knees. + tenderness to several bilateral MTPs with dactylitis noted to left 4th digit, mild tenderness with ROM to bilateral ankles, mild enthesitis (R>L). Pes planus noted to bilateral feet with R>L.    Psych: Alert, oriented, normal eye contact.    Labs:   11/22/2022: Hep B and C negative. HIV negative. Uric acid normal. ANCA negative. HLAB 27 positive. RPR and syphilis ab positive.  Chlamydia and gonorrhea PCR negative.  BASIL negative.  Rheumatoid factor and anti CCP negative.  EBV IgM negative, IgG positive.  Parvovirus B19 not detected.  EBV DNA not detected. Blood culture negative.     12/03/2022:  WBC count 17.7.  Hemoglobin 13.4.  Platelet count elevated 447.  ESR elevated 128, normal less than 15.  Calcium slightly elevated 10.8.  Alk phos 172.  CMP okay.  ALT upper limit of normal.  CRP elevated 131.    1/17/23:  CBC, CMP okay.  ESR, CRP normal. Quantiferon tb negative.     3/27/23: CBC ok. ESR 21, . CMP ok.     4/17/23:  1+ protein and no blood in urine.   milligram/gram.  Urine chlamydia gonorrhea PCR negative.    5/8/2023 CMP Creat 1.22 , Calcium 10.6, otherwise ok. CRP 97.50 (previous 203.70, normal <5). CBC hgb 13.4. platelet 444 (previous 275), otherwise ok. Sed Rate 24 (<15). Uric Acid 7 (previous 4.9).     7/11/2023 CBC ok. CMP ok. Sed Rate 4 (<15), CRP 2.90 (<5). Uric Acid 4.5     09/18/2023: CMP okay.  Lupus anticoagulant not detected. Cardiolipin/phospholipid negative.  Beta 2 glycoprotein negative.  CBC okay.  Uric acid normal 4.7.     11/26/2023 CBC WBC 12.89, ANC 9.69 (strep), otherwise ok. CMP creat 1.20    12/18/2023 CRP  6.80 (<5), Hep B/C/HIV negative, Uric Acid 6.1, Sed Rate 10 (<15), CRP 6.80 (<5), Quantiferon negative    3/26/2024 CMP ok. CBC ok.     2024 CMP ok. CBC ok. CRP 2 (<5), Uric Acid 6.3 ok. Sed Rate 3 (<15). Chlamydia and GC negative.     2024 CMP okay, HIV negative, vitamin-D 43 okay, TSH 0.561 okay, CRP 1.9 (<5), ESR 4 (< 15), CBC okay    24:  CBC, CMP okay.    2025 CMP Cl+ 109, otherwise ok, CRP 7.20 (<5), Hep B/C/HIV/Quantiferon negative, ESR 5 (<5), CBC ok,    Imagin22:  X-ray of left wrist showed possible small avulsion injury at the distal tip of the ulnar styloid process, no erosions.  X-ray of left ankle showed soft tissue swelling on lateral side.  Normal x-ray of right ankle.  Normal x-ray of right knee.    23:  Normal chest x-ray.  X-ray of right hand showed mild DJD.  X-ray of left hand showed DJD.     2022:  Echocardiogram showed EF 64%, normal ejection fraction.  Normal right ventricular function.  Normal left ventricular systolic and diastolic function.    2023: SI Joint Xray: Mild SI Joint sclerosis     23:  Mild DJD of left knee.  DJD changes seen in right knee.    2023 Right Foot Xray: Impression: No acute abnormalities are seen  Left Foot Xray: There is spurring of the insertion of the Achilles tendon.  There are changes suspicious for an early primary hallux valgus. Impression: Changes consistent with primary hallux valgus.    2024 Right Elbow Xray: Impression: No acute osseous abnormality.    Assessment:       ICD-10-CM ICD-9-CM   1. HLA-B27 positive arthropathy  M12.80 716.80   2. Rheumatoid arthritis, seronegative, multiple sites  M06.09 714.0   3. Drug-induced immunodeficiency  D84.821 279.3    Z79.899 E947.9   4. High risk medication use  Z79.899 V58.69   5. Chronic pain of right knee  M25.561 719.46    G89.29 338.29   6. History of syphilis  Z86.19 V12.09   7. Chronic midline low back pain with bilateral sciatica  M54.41 724.2     M54.42 724.3    G89.29 338.29   8. Pes planus of both feet  M21.41 734    M21.42    9. Chronic gout of multiple sites, unspecified cause  M1A.09X0 274.02   10. Episcleritis of left eye  H15.102 379.00   11. Hematochezia  K92.1 578.1   12. Gastroesophageal reflux disease, unspecified whether esophagitis present  K21.9 530.81           Plan:     1. HLA-B27 positive arthropathy  Diagnosed in 2022 after presentation to ED on 11/22/2022 with c/o of pain and swelling in left wrist, left hand, bilateral ankles and right knee. Started with pain and swelling in left wrist and progress to bilateral ankles and right knee.  Denies fevers or recent travel. History of bloody diarrhea in second week of 11/2022 in which he presented to ER for eval.  Negative RF and anti CCP. BASIL negative. HLA B27 Positive. Work up negative for EBV, Parvovirus, Chlamydia and Gonorrhea and blood cultures negative. No rashes, ECHO normal with no vegetations. Hep panel, HIV negative. Started on MTX in November of 2022, Humira added in December of 2022- worked well until March of 2023 secondary failure. Limited ROM of left wrist, repeat X-ray Jan 2023 showed showed DJD. Started Cosentyx June 2023 and tolerating well now with recent flare at the end of April 2025, also continues MTX 8 tab po qweek with folic acid 1 mg po qd.  Today on exam, active synovitis noted with dactylitis and enthesitis.   -Continue  MTX 20 mg po qweek and folic acid 1 mg daily.   -Continue Cosentyx 300 mg subq every 4 weeks. Dose was increased 4/28/2025- due for repeat dose 5/21/2025  -Chest Xray ok 1/2023  -Infection w/u negative 2/2025   -Lab today for continued monitoring    - Depo Medrol 80 mg IM X1 now  - He has a follow-up appointment scheduled mid June, we will keep for re evaluation and assessment- he was advised to call sooner if no change in symptoms after steroid injection     I had a very at length discussion with both Trey and his mother, we re-reviewed diagnosis  along with symptoms, flares.  He was concerned that pain started after he took his injection however reviewed this was a flare- possibly associated with recent illness prior to flare. We discussed option of changing medication vs increased dose and giving time as he has done well with Cosentyx thus far. He was advised to continue current dosing of MTX in his due for repeat Cosentyx injection next week in which he will take 300 mg subQ dose.  I reviewed symptoms that are associated with flare including dactylitis/enthesitis/inflammation of the eyes/shoulders/elbows/etc- can also have GI symptoms- prior to ending visit- I verified if any further questions or concerns needed to be reviewed and if any concerns with current plan and both Trey and Ms Avila denied and agreed to current plan- I strongly enc to reach out if any questions or concerns arise as we are here to help him. He did apologize again for his message that was sent as he states he was in pain and was sorry.      2. Rheumatoid arthritis, seronegative, multiple sites   -See above     3. High risk medication use/Drug Induced Immunodeficiency   -Advised to stay up-to-date on age appropriate vaccinations and malignancy screening with PCP.   -Persons with rheumatoid arthritis, lupus, psoriatic arthritis and other autoimmune diseases are at increased risk of cardiovascular disease including heart attack and stroke. We recommend that all patients with these conditions have annual health maintenance exams including lipid measurements, blood pressure measurements, and smoking cessation counseling when applicable at their primary care provider's office.   -Continued lab monitoring.   -Monitor ETOH with MTX to 1-2 drinks a week.   - Defers vaccines.     4. Chronic pain of right knee and secondary DJD   -Secondary to infection he had as a kid and secondary arthritis.   -9/18/23:  Mild DJD of left knee.  DJD changes seen in right knee.   -Currently stable at this  time no swelling noted     5. History of Syphilis  -Treated with PCN in September 2022 at health unit.      6. 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 Unsure procedure- on September 2022 with Dr. Orta- has continued to follow up  -Completed PT which he reports has been very helpful - has had an increase in pain recently- will monitor     7. Pes Planus  -Bilateral feet with R>L- currently followed by Dr. Robb in [place] (podiatry)- recommended orthotics. May be contributing to some of his foot pain.   -12/18/2023 Right Foot Xray: Impression: No acute abnormalities are seen  Left Foot Xray: There is spurring of the insertion of the Achilles tendon.  There are changes suspicious for an early primary hallux valgus. Impression: Changes consistent with primary hallux valgus  - Previously educated bilateral feet pain secondary to flatfoot, poor midfoot and ankle 's and not because of Gout- Enc to continued to follow up with Dr Robb with inserts      8. Gout  -Currently treated by his PCP   -Last Uric Acid 6.3 June 2024  -Continue Allopurinol 100 mg po qd with PCP as directed.  - Will check Uric Acid today      9. Episcleritis - Left  -  2/26/2025 Dr. Contreras: Anterior Chamber OS no signs cell and flare, Sclera, Epi Scleritis: Episcleritis periodica fugax- Left: Lotemax 1 gtts TID OS only- RTC On Friday for f/u   2/28/2025 F/U Anterior Chamber Left Eye No signs cell and flare, Sclera Epi Scleritis- did not start gtts as directed- states pharm did not have gtts- ordered- samples given and RTC Wednesday. 3/5/25: Sclera Left eye 1+ epi scleritis, anterior chamber no cell or flare present, Iris 1+ posterior synechiae inferior- pain improved with gtts, posterior synechiae noted inferior today- d/c Lotemax, start pred forte 1 gtts qid left eye x 1 week, 1 gtts tid x 1 week, 1 gtts bid x 1 week, 1 gtts qd x 1 week- RTC in 3-4 weeks for follow up  05/2025  THUY Lotemax, start Pred Forte 1 drop q.i.d. OS x1 week, 1 drop t.i.d. OS x1 week, 1 drop b.i.d. OS x1 week, 1 drop q.day OS x1 week-return to clinic in 3-4 weeks for follow-up.  Anterior chamber OD deep and quiet, OS no cell or flare present, OD exam normal, OS iris 1+ posterior synechiae inferior.  04/10/2025 follow-up patient reports quality is clear vision and back to normal, denies redness, pain or irritation., completed drops as directed.  Sclera 1+ episcleritis OS, no posterior synechiae inferior.  Episcleritis resolving, completed therapy, return to clinic in 1 year or sooner if needed  04/25/2025 presented for possible infection of bilateral eyes x1 day.  Redness, discharge, swelling and burning.  Slit-lamp deep and quiet bilaterally, OS iris and lens with old synechiae evidence.  Exam revealed acute bilateral conjunctivitis, started on ciprofloxacin q.i.d. with one-week follow-up., episcleritis OS, restarted Pred Forte Q 4, follow-up Monday (visit on Friday)  - Today on exam, bilateral conjunctiva with no injection/erythema/pain.  He does report he has continued to have issues with blurred vision in his left high, we will be picking up new prescription glasses this week, advised to follow-up with Ophthalmology for any further concerns     10. Hematochezia/GERD  - Occurred with recent GI illness (Vomiting/Diarrhea- has resolved once diarrhea resolved, otherwise no issues  - Will place referral to GI for est of care  and evaluation- he reports PCP did order Colonscopy to be completed- states he was scheduled however due to current pain he was concerned with prep and has postponed.  I encouraged him to reschedule once flare has resolved and pain has improved.  - Referral sent, advised if they do not hear from anyone in the next 2-3 weeks in regards to apt, enc to reach out to our office so we can check the status of referral. Will also follow up in 2 weeks at f/u apt          Follow up in about 7 months (around  12/16/2025) for NP Follow Up. In addition to their scheduled follow up, the patient has also been instructed to follow up on as needed basis.     Total time spent with patient and documentation is 80 minutes. All questions were answered to patient's satisfaction and patient verbalized understanding. This includes face to face time and non-face to face time preparing to see the patient (eg, review of tests), obtaining and/or reviewing separately obtained history, documenting clinical information in the electronic or other health record, independently interpreting results and communicating results to the patient/family/caregiver, or care coordinator.  Today's visit included increased complexity associated with the care of episodic problem AS/meds/flares/etc addressed and managing the longitudinal care of the patient due to the series and or complex managed problem(s).

## 2025-05-19 ENCOUNTER — TELEPHONE (OUTPATIENT)
Dept: RHEUMATOLOGY | Facility: CLINIC | Age: 45
End: 2025-05-19
Payer: MEDICARE

## 2025-05-19 NOTE — TELEPHONE ENCOUNTER
Communicated results from Provider [place]. Patient verbalized full understanding with no questions voiced upon inquiry.

## 2025-05-20 ENCOUNTER — PATIENT MESSAGE (OUTPATIENT)
Dept: RHEUMATOLOGY | Facility: CLINIC | Age: 45
End: 2025-05-20
Payer: MEDICARE

## 2025-05-30 ENCOUNTER — PATIENT MESSAGE (OUTPATIENT)
Dept: RHEUMATOLOGY | Facility: CLINIC | Age: 45
End: 2025-05-30
Payer: MEDICARE

## 2025-06-03 ENCOUNTER — PATIENT MESSAGE (OUTPATIENT)
Dept: OTOLARYNGOLOGY | Facility: CLINIC | Age: 45
End: 2025-06-03
Payer: MEDICARE

## 2025-06-04 ENCOUNTER — PATIENT MESSAGE (OUTPATIENT)
Dept: RHEUMATOLOGY | Facility: CLINIC | Age: 45
End: 2025-06-04
Payer: MEDICARE

## 2025-06-12 ENCOUNTER — OFFICE VISIT (OUTPATIENT)
Dept: RHEUMATOLOGY | Facility: CLINIC | Age: 45
End: 2025-06-12
Payer: MEDICARE

## 2025-06-12 ENCOUNTER — PATIENT MESSAGE (OUTPATIENT)
Dept: RHEUMATOLOGY | Facility: CLINIC | Age: 45
End: 2025-06-12

## 2025-06-12 ENCOUNTER — TELEPHONE (OUTPATIENT)
Dept: RHEUMATOLOGY | Facility: CLINIC | Age: 45
End: 2025-06-12
Payer: MEDICARE

## 2025-06-12 VITALS
TEMPERATURE: 99 F | BODY MASS INDEX: 28.58 KG/M2 | HEIGHT: 69 IN | OXYGEN SATURATION: 98 % | WEIGHT: 193 LBS | DIASTOLIC BLOOD PRESSURE: 89 MMHG | SYSTOLIC BLOOD PRESSURE: 133 MMHG | HEART RATE: 85 BPM | RESPIRATION RATE: 18 BRPM

## 2025-06-12 DIAGNOSIS — G89.29 CHRONIC PAIN OF BOTH FEET: ICD-10-CM

## 2025-06-12 DIAGNOSIS — M46.90 DACTYLITIS DUE TO SPONDYLOARTHRITIC DISORDER: ICD-10-CM

## 2025-06-12 DIAGNOSIS — L40.50 PSORIATIC ARTHRITIS: ICD-10-CM

## 2025-06-12 DIAGNOSIS — M79.671 CHRONIC PAIN OF BOTH FEET: ICD-10-CM

## 2025-06-12 DIAGNOSIS — M79.672 CHRONIC PAIN OF BOTH FEET: ICD-10-CM

## 2025-06-12 DIAGNOSIS — D84.821 DRUG-INDUCED IMMUNODEFICIENCY: ICD-10-CM

## 2025-06-12 DIAGNOSIS — Z86.19 HISTORY OF SYPHILIS: ICD-10-CM

## 2025-06-12 DIAGNOSIS — M21.41 PES PLANUS OF BOTH FEET: ICD-10-CM

## 2025-06-12 DIAGNOSIS — M12.80 HLA-B27 POSITIVE ARTHROPATHY: Primary | ICD-10-CM

## 2025-06-12 DIAGNOSIS — Z79.899 DRUG-INDUCED IMMUNODEFICIENCY: ICD-10-CM

## 2025-06-12 DIAGNOSIS — M21.42 PES PLANUS OF BOTH FEET: ICD-10-CM

## 2025-06-12 DIAGNOSIS — Z79.899 HIGH RISK MEDICATION USE: ICD-10-CM

## 2025-06-12 DIAGNOSIS — M1A.09X0 CHRONIC GOUT OF MULTIPLE SITES, UNSPECIFIED CAUSE: ICD-10-CM

## 2025-06-12 DIAGNOSIS — M06.09 RHEUMATOID ARTHRITIS, SERONEGATIVE, MULTIPLE SITES: ICD-10-CM

## 2025-06-12 PROCEDURE — 3079F DIAST BP 80-89 MM HG: CPT | Mod: CPTII,,, | Performed by: INTERNAL MEDICINE

## 2025-06-12 PROCEDURE — 99215 OFFICE O/P EST HI 40 MIN: CPT | Mod: PBBFAC | Performed by: INTERNAL MEDICINE

## 2025-06-12 PROCEDURE — 3075F SYST BP GE 130 - 139MM HG: CPT | Mod: CPTII,,, | Performed by: INTERNAL MEDICINE

## 2025-06-12 PROCEDURE — 1159F MED LIST DOCD IN RCRD: CPT | Mod: CPTII,,, | Performed by: INTERNAL MEDICINE

## 2025-06-12 PROCEDURE — G2211 COMPLEX E/M VISIT ADD ON: HCPCS | Mod: ,,, | Performed by: INTERNAL MEDICINE

## 2025-06-12 PROCEDURE — 99215 OFFICE O/P EST HI 40 MIN: CPT | Mod: S$PBB,,, | Performed by: INTERNAL MEDICINE

## 2025-06-12 PROCEDURE — 3008F BODY MASS INDEX DOCD: CPT | Mod: CPTII,,, | Performed by: INTERNAL MEDICINE

## 2025-06-12 RX ORDER — FOLIC ACID 1 MG/1
1 TABLET ORAL DAILY
Qty: 30 TABLET | Refills: 6 | Status: SHIPPED | OUTPATIENT
Start: 2025-06-12 | End: 2025-09-10

## 2025-06-12 RX ORDER — METHOTREXATE 2.5 MG/1
TABLET ORAL
Qty: 40 TABLET | Refills: 2 | Status: SHIPPED | OUTPATIENT
Start: 2025-06-12

## 2025-06-12 RX ORDER — GUSELKUMAB 100 MG/ML
100 INJECTION SUBCUTANEOUS
Qty: 1 EACH | Refills: 6 | Status: SHIPPED | OUTPATIENT
Start: 2025-07-31 | End: 2025-06-13

## 2025-06-12 RX ORDER — GUSELKUMAB 100 MG/ML
INJECTION SUBCUTANEOUS
Qty: 2 ML | Refills: 0 | Status: SHIPPED | OUTPATIENT
Start: 2025-06-12 | End: 2025-06-13

## 2025-06-12 NOTE — PROGRESS NOTES
Patient ID: [id number]     Chief Complaint: Follow-up (Patient is here for a follow up +HLA-B27 Spondyloarthritis/reactive arthritis. He reports compliance with Cosentyx injection. He states he is having pain to his felicia lower extremities(mainly feet) making it hard for him to walk and function. He also states he is having pain and discomfort to his felicia knees. )    HPI:     Trey Harper is a 45 y.o. male here today for follow up of spondyloarthritis, +HLA-B27.      Presents today for a follow up of +HLA-B27 Spondyloarthritis/reactive arthritis diagnosed in 2022 after presentation to ED on 11/22/2022 with c/o of pain and swelling in left wrist, left hand, bilateral ankles and right knee.  History of bloody diarrhea few weeks prior to that presentation.  He was treated with Ciprofloxacin and Flagyl at the time. History of septic arthritis in right knee when he was 5 to 6 years old. He previously tested positive for Syphilis and was treated with Penicillin in 9/2022.  He was initially started on Humira but developed secondary failure.      Here today for follow up. Currently taking Methotrexate 20 mg per week with folic acid 1 mg daily and Cosentyx 150 mg subQ every 4 weeks. He denies any red/warm/swollen joints, morning stiffness lasting roughly 15-20 minutes. Tolerating medications well.   C/o pain in bilateral feet, mid foot, when he walks, better with stretching for last 1-2 months. PCP told him that its gout. Educated its not gout pain in feet secondary to flat fee and recommend seeing podiatry.   He has seen Podiatry in the past and was told he needed braces for his ankles, followed by Dr. Garvin (podiatrist) in [place]. He was also told he has severe flat feet bilaterally and needs custom fit orthotics, he can not afford that.    He was also started on Allopurinol 100 mg po qd by his PCP.   Continues to have intermittent back pain, worse when walking and standing on feet. Reports back pain at night,  has a hard time falling asleep due to pain and getting comfortable, when he does fall asleep he states it is very short lived due to pain waking him up.     He has continued to have issues with his feet, mainly with walking, recently visited the [facility] and had some inserts made so hoping this helps. Has also seen Podiatry in the past and was told he needed braces for his ankles, followed by Dr. Garvin (podiatrist) in [place]- he has tried to wear but has been uncomfortable so admits not wearing as directed.     June 2024: His feet continue to bother him at times, he was seeing Cezar in the past but has not seen him in a while, last discussed Orthotics and does have but has issues with blisters when uses. Back pain is not bothering him as much now, will have some aches and pains here and there but for the most part has been much better with meds. He has also been having right elbow pain for the past 2 months, comes and goes, denies injury. Reports when he bends his elbow he can feel pain, feels pain is deep to medial aspect, he is right hand dominant. He has not taken anything for pain.    September 2024: Here today for follow-up visit. Currently taking Methotrexate 20 mg per week with folic acid 1 mg daily and Cosentyx 150 mg subQ every 4 weeks. He denies any red/warm/swollen joints, morning stiffness lasting roughly 15-20 minutes. He reports he will have some swelling in his feet at times if he is on them for long period- he will ice his feet at night time and this does help. He does report an increase in fatigue over the past few months- states he has been sleeping well but states when he wakes up he is not feeling rested- he did just have an apt with his PCP 2 days ago and is awaiting lab results- however did not mention to her the fatigue at that time.       February 2025: Here today for follow up.  Currently taking Methotrexate 20 mg per week with folic acid 1 mg daily and Cosentyx 150 mg subQ  every 4 weeks. He denies any red/warm/swollen joints, morning stiffness lasting roughly 15-20 minutes. He was having some pain in his feet- seeing Podiatry- was suppose to be wearing braces but states causes blisters- following as needed- did get rx for custom insoles but needs to have made. He woke up yesterday with left eye redness with crusting and discharge, states very sensitive to light- has an apt today with Dr. Olea for evaluation, does not think foreign object. Denies missing any doses of meds, taking as directed.     May 2025: Here today for follow up. He is here with his mother Ms. Avila for follow-up and evaluation of continued pain.  He called 04/28/2025 with an increase in joint pain/swelling and pain and redness of his left eye. He reports taking MTX  20 mg po qweek with folic acid 1 mg po qd and Cosentyx 150 mg subq qmonth without missing any doses. At this time prednisone x 5 days was sent over and Cosentyx dose increase to 300 mg subQ-he just took his 150 mg dose-advised to take a 2nd 150 mg injection in which he did.  He reports pain continued, he was also having inflammation of his left eye in which he was following with his Ophthalmologist, he was diagnosed with episcleritis  and treated with prednisone drops-has resolved (records requested and reviewed).  He is still having issues with some blurriness but he will be taking up his new prescription eyeglasses within the week-he was advised to continue to follow-up with ophthalmology for any issues or concerns that continue- redness and pain have resolved.  He reports he has continued to have pain and is very tearful and frustrated as it has interfered with his ADLs, was given steroids by his PCP earlier this month in which he reports did help but he does continue to have some pain and discomfort, especially pain and swelling to his left foot 4th toe.  He states he was having severe pain to all joints, he denies having any swelling at this  time with the exception of his toe, he also reports having pain to the posterior aspect of bilateral ankles.  He denies any issues with rashes.  He does report having a GI bug the week prior to symptoms starting (vomiting and then diarrhea), at this time he did have some bloody stools with diarrhea-reports resolved once diarrhea subsided and has not occurred again.  He was scheduled to have a colonoscopy done but reports he was concerned with prep and getting to the restroom due to pain so has postponed at this time he is currently not following with GI but was referred by his PCP.        06/12/2025  Earlier this month patient called in complaining of swelling to the left knee and distal, pain with ambulation has tried ice packs, Naprosyn.  He had been traveling to [place] at that time and had been instructed to visit the emergency department with a concern over DVT.still dealing with same foot and knee problems. States sewlling has improved but pain persists.  States with increase in cosentyx dose to 300 has not noticed a change, had it a week ago. Has seen Dr. Donis in [place] for worsening vision, now wearing glasses. Redness and photosensitivity has improved.     Denies any recent illness or hospitalizations since last visit.     Dr. Robb in [place]- Podiatry  Dr. Orta- Neurospinal  Dr. Olea- Ophthalmology     PMH: GERD and chronic right knee pain.    Denies fevers, rashes, oral and nasal ulcers, history of DVT or PE, history of stroke or seizures, history of MI, history of malignancies, Raynaud's phenomenon,  history of inflammatory bowel disease.  Family history of autoimmune disease: Pat Aunt with RA.   Smoking: none  Father with Esophageal Ca     Social History     Tobacco Use   Smoking Status Never    Passive exposure: Never   Smokeless Tobacco Never          -------------------------------------    Arthritis    Gout, unspecified    Other specified noninfective gastroenteritis and colitis     Syphilis, unspecified        Past Surgical History:   Procedure Laterality Date    SPINE SURGERY  09/19/2022       Review of patient's allergies indicates:  No Known Allergies    Outpatient Medications Marked as Taking for the 6/12/25 encounter (Office Visit) with Kojo Lopez MD   Medication Sig Dispense Refill    ALCOHOL PREP PADS PadM SMARTSIG:Pledget(s) Topical      allopurinoL (ZYLOPRIM) 100 MG tablet Take 100 mg by mouth Daily.      azelastine (ASTELIN) 137 mcg (0.1 %) nasal spray 1 spray (137 mcg total) by Nasal route 2 (two) times daily. 90 mL 3    COSENTYX  mg/mL PnIj Inject 300 mg subq qmonth. Hold for fever or infections. 2 mL 5    famotidine (PEPCID) 20 MG tablet Take 20 mg by mouth 2 (two) times daily.      folic acid (FOLVITE) 1 MG tablet Take 1 tablet (1 mg total) by mouth once daily. 30 tablet 6    meloxicam (MOBIC) 7.5 MG tablet TAKE ONE TABLET BY MOUTH ONCE A DAY for 90 days      methotrexate 2.5 MG Tab Take 4 tablets on Friday morning and 4 tablets on Friday night, once a week. Hold for infection or fever. 40 tablet 2    multivit with min-folic acid 0.4 mg Tab 1 tablet Orally Once a day      PROTONIX 40 mg tablet Take 40 mg by mouth once daily.      SHARPS CONTAINER use as directed         Social History     Socioeconomic History    Marital status: Single   Tobacco Use    Smoking status: Never     Passive exposure: Never    Smokeless tobacco: Never   Substance and Sexual Activity    Alcohol use: Yes     Comment: occ    Drug use: Never    Sexual activity: Not Currently     Partners: Male     Birth control/protection: None        Family History   Problem Relation Name Age of Onset    Cancer Father Esophagus cancer     Rheum arthritis Paternal Aunt      Rheum arthritis Paternal Grandmother          Immunization History   Administered Date(s) Administered    DT (Pediatric) 12/17/1984, 09/30/1994    DTP 1980, 1980, 1980, 10/22/1981    MMR 06/25/1981    OPV 1980,  "1980, 1980, 10/22/1981       Patient Care Team:  Kiko Meier MD as PCP - General (Family Medicine)     Subjective:     ROS    Constitutional:  Denies chills. Denies fever. Denies night sweats. Denies weight loss.   Ophthalmology: Admits blurred vision. Denies dry eyes. Denies  eye pain due to sensitivity to light. Denies Itching. denies redness.   ENT: Denies oral ulcers. Denies epistaxis. Denies dry mouth. Denies swollen glands.   Endocrine: Denies diabetes. Denies thyroid Problems.   Respiratory: Denies cough. Denies shortness of breath. Denies shortness of breath with exertion. Denies hemoptysis.   Cardiovascular: Denies chest pain at rest. Denies chest pain with exertion. Denies palpitations.    Gastrointestinal: Denies abdominal pain. Denies diarrhea. Denies nausea. Denies vomiting. Denies hematemesis or hematochezia. Denies heartburn.  Genitourinary: Denies blood in urine.  Musculoskeletal: See HPI for details  Integumentary: Denies rash. Denies photosensitivity.   Peripheral Vascular: Denies Ulcers of hands and/or feet. Denies Cold extremities.   Neurologic: Denies dizziness. Denies headache.  Denies loss of strength. Denies numbness or tingling.   Psychiatric: Denies depression and anxiety- doing much better since he is feeling better. Denies suicidal/homicidal ideations.      Objective:     Resp 18   Ht 5' 9" (1.753 m)   Wt 87.5 kg (193 lb)   BMI 28.50 kg/m²     Physical Exam    General Appearance: alert, pleasant, in no acute distress.  Skin: Skin color, texture, turgor normal. No rashes or lesions.  Eyes:  extraocular movement intact (EOMI), pupils equal, round, reactive to light and accommodation, conjunctiva clear on right, injected and erythematous on left, no discharge noted. Today bilateral conjunctiva clear, PERRLA  ENT: No oral or nasal ulcers.  Neck:  Neck supple. No adenopathy.   Lungs: CTA throughout without crackles, rhonchi, or wheezes.   Heart: RRR w/o murmurs.  No " edema.   Neuro: Alert, oriented, CN II-XII GI, sensory and motor innervation intact.  Musculoskeletal: No pain to bilateral MCPs/PIPs/DIPs, FROM noted to wrists and shoulders with no pain, medial epicondyle tenderness to bilateral elbows, no swelling noted, FROM noted bilaterally, + crepitus noted bilateral knees. + tenderness to several bilateral MTPs with dactylitis noted to left 4th digit, mild tenderness with ROM to bilateral ankles, mild enthesitis (R>L). Pes planus noted to bilateral feet with R>L.    Psych: Alert, oriented, normal eye contact.    Labs:   11/22/2022: Hep B and C negative. HIV negative. Uric acid normal. ANCA negative. HLAB 27 positive. RPR and syphilis ab positive.  Chlamydia and gonorrhea PCR negative.  BASIL negative.  Rheumatoid factor and anti CCP negative.  EBV IgM negative, IgG positive.  Parvovirus B19 not detected.  EBV DNA not detected. Blood culture negative.     12/03/2022:  WBC count 17.7.  Hemoglobin 13.4.  Platelet count elevated 447.  ESR elevated 128, normal less than 15.  Calcium slightly elevated 10.8.  Alk phos 172.  CMP okay.  ALT upper limit of normal.  CRP elevated 131.    1/17/23:  CBC, CMP okay.  ESR, CRP normal. Quantiferon tb negative.     3/27/23: CBC ok. ESR 21, . CMP ok.     4/17/23:  1+ protein and no blood in urine.   milligram/gram.  Urine chlamydia gonorrhea PCR negative.    5/8/2023 CMP Creat 1.22 , Calcium 10.6, otherwise ok. CRP 97.50 (previous 203.70, normal <5). CBC hgb 13.4. platelet 444 (previous 275), otherwise ok. Sed Rate 24 (<15). Uric Acid 7 (previous 4.9).     7/11/2023 CBC ok. CMP ok. Sed Rate 4 (<15), CRP 2.90 (<5). Uric Acid 4.5     09/18/2023: CMP okay.  Lupus anticoagulant not detected. Cardiolipin/phospholipid negative.  Beta 2 glycoprotein negative.  CBC okay.  Uric acid normal 4.7.     11/26/2023 CBC WBC 12.89, ANC 9.69 (strep), otherwise ok. CMP creat 1.20    12/18/2023 CRP 6.80 (<5), Hep B/C/HIV negative, Uric Acid 6.1, Sed  Rate 10 (<15), CRP 6.80 (<5), Quantiferon negative    3/26/2024 CMP ok. CBC ok.     2024 CMP ok. CBC ok. CRP 2 (<5), Uric Acid 6.3 ok. Sed Rate 3 (<15). Chlamydia and GC negative.     2024 CMP okay, HIV negative, vitamin-D 43 okay, TSH 0.561 okay, CRP 1.9 (<5), ESR 4 (< 15), CBC okay    24:  CBC, CMP okay.    2025 CMP Cl+ 109, otherwise ok, CRP 7.20 (<5), Hep B/C/HIV/Quantiferon negative, ESR 5 (<5), CBC ok,    2025 CMP okay, CRP 51.8 (<5), uric acid 6.2, ESR 15 (< 15), CBC okay    Imagin22:  X-ray of left wrist showed possible small avulsion injury at the distal tip of the ulnar styloid process, no erosions.  X-ray of left ankle showed soft tissue swelling on lateral side.  Normal x-ray of right ankle.  Normal x-ray of right knee.    23:  Normal chest x-ray.  X-ray of right hand showed mild DJD.  X-ray of left hand showed DJD.     2022:  Echocardiogram showed EF 64%, normal ejection fraction.  Normal right ventricular function.  Normal left ventricular systolic and diastolic function.    2023: SI Joint Xray: Mild SI Joint sclerosis     23:  Mild DJD of left knee.  DJD changes seen in right knee.    2023 Right Foot Xray: Impression: No acute abnormalities are seen  Left Foot Xray: There is spurring of the insertion of the Achilles tendon.  There are changes suspicious for an early primary hallux valgus. Impression: Changes consistent with primary hallux valgus.    2024 Right Elbow Xray: Impression: No acute osseous abnormality.    Assessment:     No diagnosis found.          Plan:     1. HLA-B27 positive arthropathy  Diagnosed in  after presentation to ED on 2022 with c/o of pain and swelling in left wrist, left hand, bilateral ankles and right knee. Started with pain and swelling in left wrist and progress to bilateral ankles and right knee.  Denies fevers or recent travel. History of bloody diarrhea in second week of 2022 in which  he presented to ER for eval.  Negative RF and anti CCP. BASIL negative. HLA B27 Positive. Work up negative for EBV, Parvovirus, Chlamydia and Gonorrhea and blood cultures negative. No rashes, ECHO normal with no vegetations. Hep panel, HIV negative. Started on MTX in November of 2022, Humira added in December of 2022- worked well until March of 2023 secondary failure. Limited ROM of left wrist, repeat X-ray Jan 2023 showed showed DJD. Started Cosentyx June 2023 and tolerating well now with recent flare at the end of April 2025, also continues MTX 8 tab po qweek with folic acid 1 mg po qd.  Today on exam, active synovitis noted with dactylitis and enthesitis.   -Continue  MTX 20 mg po qweek and folic acid 1 mg daily.   -Continue Cosentyx 300 mg subq every 4 weeks. Offered to start Tremfya will work on PA. Hold cosentyx at time of next dose if approved. If unable to get PA will   -Chest Xray ok 1/2023  -Infection w/u negative 2/2025   -Lab today for continued monitoring    - He has a follow-up appointment scheduled mid June, we will keep for re evaluation and assessment- he was advised to call sooner if no change in symptoms after steroid injection      2. High risk medication use/Drug Induced Immunodeficiency   -Advised to stay up-to-date on age appropriate vaccinations and malignancy screening with PCP.   -Persons with rheumatoid arthritis, lupus, psoriatic arthritis and other autoimmune diseases are at increased risk of cardiovascular disease including heart attack and stroke. We recommend that all patients with these conditions have annual health maintenance exams including lipid measurements, blood pressure measurements, and smoking cessation counseling when applicable at their primary care provider's office.   -Continued lab monitoring.   -Monitor ETOH with MTX to 1-2 drinks a week.   - Defers vaccines.     3. Chronic pain of right knee and secondary DJD   -Secondary to infection he had as a kid and secondary  arthritis.   -9/18/23:  Mild DJD of left knee.  DJD changes seen in right knee.   -Currently stable at this time no swelling noted     4. History of Syphilis  -Treated with PCN in September 2022 at health unit.      5. 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 Unsure procedure- on September 2022 with Dr. Orta- has continued to follow up  -Completed PT which he reports has been very helpful -  will monitor     6. Pes Planus  -Bilateral feet with R>L- currently followed by Dr. Robb in [place] (podiatry)- recommended orthotics. May be contributing to some of his foot pain.   -12/18/2023 Right Foot Xray: Impression: No acute abnormalities are seen  Left Foot Xray: There is spurring of the insertion of the Achilles tendon.  There are changes suspicious for an early primary hallux valgus. Impression: Changes consistent with primary hallux valgus  - Previously educated bilateral feet pain secondary to flatfoot, poor midfoot and ankle 's and not because of Gout- Enc to continued to follow up with Dr Robb with inserts      7. Gout  -Currently treated by his PCP   -Last Uric Acid 6.3 June 2024  -Continue Allopurinol 100 mg po qd with PCP as directed.       8. Episcleritis - Left  -  2/26/2025 Dr. Contreras: Anterior Chamber OS no signs cell and flare, Sclera, Epi Scleritis: Episcleritis periodica fugax- Left: Lotemax 1 gtts TID OS only- RTC On Friday for f/u   2/28/2025 F/U Anterior Chamber Left Eye No signs cell and flare, Sclera Epi Scleritis- did not start gtts as directed- states pharm did not have gtts- ordered- samples given and RTC Wednesday. 3/5/25: Sclera Left eye 1+ epi scleritis, anterior chamber no cell or flare present, Iris 1+ posterior synechiae inferior- pain improved with gtts, posterior synechiae noted inferior today- d/c Lotemax, start pred forte 1 gtts qid left eye x 1 week, 1 gtts tid x 1 week, 1 gtts bid x 1 week, 1 gtts qd x 1  week- RTC in 3-4 weeks for follow up  05/2025 DC Lotemax, start Pred Forte 1 drop q.i.d. OS x1 week, 1 drop t.i.d. OS x1 week, 1 drop b.i.d. OS x1 week, 1 drop q.day OS x1 week-return to clinic in 3-4 weeks for follow-up.  Anterior chamber OD deep and quiet, OS no cell or flare present, OD exam normal, OS iris 1+ posterior synechiae inferior.  04/10/2025 follow-up patient reports quality is clear vision and back to normal, denies redness, pain or irritation., completed drops as directed.  Sclera 1+ episcleritis OS, no posterior synechiae inferior.  Episcleritis resolving, completed therapy, return to clinic in 1 year or sooner if needed  04/25/2025 presented for possible infection of bilateral eyes x1 day.  Redness, discharge, swelling and burning.  Slit-lamp deep and quiet bilaterally, OS iris and lens with old synechiae evidence.  Exam revealed acute bilateral conjunctivitis, started on ciprofloxacin q.i.d. with one-week follow-up., episcleritis OS, restarted Pred Forte Q 4, follow-up Monday (visit on Friday)  - Today on exam, bilateral conjunctiva with no injection/erythema/pain.  He does report he has continued to have issues with blurred vision in his left high, we will be picking up new prescription glasses this week, advised to follow-up with Ophthalmology for any further concerns     9. Hematochezia/GERD  - Occurred with recent GI illness (Vomiting/Diarrhea- has resolved once diarrhea resolved, otherwise no issues  - referred to GI on 05/21/2025 place referral to GI for est of care  and evaluation- he reports PCP did order Colonscopy to be completed-            No follow-ups on file. In addition to their scheduled follow up, the patient has also been instructed to follow up on as needed basis.     Total time spent with patient and documentation is 40 minutes. All questions were answered to patient's satisfaction and patient verbalized understanding. This includes face to face time and non-face to face time  preparing to see the patient (eg, review of tests), obtaining and/or reviewing separately obtained history, documenting clinical information in the electronic or other health record, independently interpreting results and communicating results to the patient/family/caregiver, or care coordinator.

## 2025-06-12 NOTE — TELEPHONE ENCOUNTER
Starting him on Tremfya  for psoriatic arthritis and spondyloarthritis. Staring loading dose and after completing he will start maintenance.     Please work on PA and let uriel know the exact dates he has to take the medication, he prefers to get information over text.

## 2025-06-13 ENCOUNTER — PATIENT MESSAGE (OUTPATIENT)
Dept: RHEUMATOLOGY | Facility: CLINIC | Age: 45
End: 2025-06-13
Payer: MEDICARE

## 2025-06-13 DIAGNOSIS — L40.50 PSORIATIC ARTHRITIS: ICD-10-CM

## 2025-06-13 DIAGNOSIS — M12.80 HLA-B27 POSITIVE ARTHROPATHY: Primary | ICD-10-CM

## 2025-06-13 RX ORDER — SECUKINUMAB 150 MG/ML
300 INJECTION SUBCUTANEOUS
Qty: 2 EACH | Refills: 11 | Status: SHIPPED | OUTPATIENT
Start: 2025-06-13

## 2025-07-14 ENCOUNTER — PATIENT MESSAGE (OUTPATIENT)
Dept: RHEUMATOLOGY | Facility: CLINIC | Age: 45
End: 2025-07-14
Payer: MEDICARE

## 2025-08-04 DIAGNOSIS — M12.80 HLA-B27 POSITIVE ARTHROPATHY: ICD-10-CM

## 2025-08-04 DIAGNOSIS — L40.50 PSORIATIC ARTHRITIS: ICD-10-CM

## 2025-08-05 RX ORDER — SECUKINUMAB 150 MG/ML
300 INJECTION SUBCUTANEOUS
Qty: 2 EACH | Refills: 11 | Status: SHIPPED | OUTPATIENT
Start: 2025-08-05